# Patient Record
Sex: FEMALE | Race: WHITE | NOT HISPANIC OR LATINO | Employment: PART TIME | URBAN - METROPOLITAN AREA
[De-identification: names, ages, dates, MRNs, and addresses within clinical notes are randomized per-mention and may not be internally consistent; named-entity substitution may affect disease eponyms.]

---

## 2017-03-02 ENCOUNTER — ALLSCRIPTS OFFICE VISIT (OUTPATIENT)
Dept: OTHER | Facility: OTHER | Age: 42
End: 2017-03-02

## 2017-03-02 DIAGNOSIS — G40.109 LOCALZ-RLTD SYMPTOMATIC EPILEPSY W SMPL PART SZ, NONINTRACT, WO STATUS (HCC): ICD-10-CM

## 2017-03-10 ENCOUNTER — ALLSCRIPTS OFFICE VISIT (OUTPATIENT)
Dept: OTHER | Facility: OTHER | Age: 42
End: 2017-03-10

## 2017-06-29 ENCOUNTER — ALLSCRIPTS OFFICE VISIT (OUTPATIENT)
Dept: OTHER | Facility: OTHER | Age: 42
End: 2017-06-29

## 2017-06-29 ENCOUNTER — GENERIC CONVERSION - ENCOUNTER (OUTPATIENT)
Dept: OTHER | Facility: OTHER | Age: 42
End: 2017-06-29

## 2017-07-20 ENCOUNTER — GENERIC CONVERSION - ENCOUNTER (OUTPATIENT)
Dept: OTHER | Facility: OTHER | Age: 42
End: 2017-07-20

## 2017-07-20 DIAGNOSIS — N39.0 URINARY TRACT INFECTION: ICD-10-CM

## 2017-07-24 ENCOUNTER — GENERIC CONVERSION - ENCOUNTER (OUTPATIENT)
Dept: OTHER | Facility: OTHER | Age: 42
End: 2017-07-24

## 2017-07-28 ENCOUNTER — GENERIC CONVERSION - ENCOUNTER (OUTPATIENT)
Dept: OTHER | Facility: OTHER | Age: 42
End: 2017-07-28

## 2017-07-28 ENCOUNTER — HOSPITAL ENCOUNTER (OUTPATIENT)
Dept: INFUSION CENTER | Facility: CLINIC | Age: 42
Discharge: HOME | End: 2017-07-28
Payer: COMMERCIAL

## 2017-08-11 ENCOUNTER — GENERIC CONVERSION - ENCOUNTER (OUTPATIENT)
Dept: OTHER | Facility: OTHER | Age: 42
End: 2017-08-11

## 2017-08-17 ENCOUNTER — HOSPITAL ENCOUNTER (OUTPATIENT)
Dept: INFUSION CENTER | Facility: CLINIC | Age: 42
Discharge: HOME | End: 2017-08-17
Payer: COMMERCIAL

## 2017-08-17 VITALS
HEART RATE: 90 BPM | DIASTOLIC BLOOD PRESSURE: 81 MMHG | RESPIRATION RATE: 16 BRPM | TEMPERATURE: 98 F | SYSTOLIC BLOOD PRESSURE: 136 MMHG

## 2017-08-17 PROCEDURE — 51720 TREATMENT OF BLADDER LESION: CPT

## 2017-08-17 RX ORDER — LAMOTRIGINE 200 MG/1
25 TABLET ORAL 2 TIMES DAILY
COMMUNITY
End: 2018-03-17 | Stop reason: SDUPTHER

## 2017-08-17 RX ORDER — NITROFURANTOIN 25; 75 MG/1; MG/1
100 CAPSULE ORAL AS NEEDED
COMMUNITY
End: 2021-03-25 | Stop reason: HOSPADM

## 2017-08-17 RX ORDER — IBUPROFEN 200 MG
600 TABLET ORAL EVERY 8 HOURS PRN
COMMUNITY
End: 2019-03-15 | Stop reason: CLARIF

## 2017-08-17 RX ORDER — ACETAMINOPHEN 500 MG
1000 TABLET ORAL EVERY 6 HOURS PRN
COMMUNITY
End: 2021-03-25 | Stop reason: HOSPADM

## 2017-08-17 RX ORDER — VENLAFAXINE HYDROCHLORIDE 75 MG/1
75 CAPSULE, EXTENDED RELEASE ORAL EVERY EVENING
COMMUNITY

## 2017-08-17 RX ORDER — AZELASTINE 1 MG/ML
2 SPRAY, METERED NASAL 2 TIMES DAILY
COMMUNITY
End: 2019-03-15 | Stop reason: SDUPTHER

## 2017-08-17 RX ORDER — ATENOLOL 25 MG/1
25 TABLET ORAL EVERY MORNING
COMMUNITY
End: 2018-10-18

## 2017-08-17 RX ORDER — FLUTICASONE PROPIONATE 50 MCG
2 SPRAY, SUSPENSION (ML) NASAL 2 TIMES DAILY
COMMUNITY

## 2017-08-17 RX ORDER — CLOTRIMAZOLE 1 %
CREAM (GRAM) TOPICAL AS NEEDED
COMMUNITY

## 2017-08-17 RX ORDER — OMEPRAZOLE 20 MG/1
20 CAPSULE, DELAYED RELEASE ORAL EVERY MORNING
COMMUNITY

## 2017-08-17 RX ORDER — ROSUVASTATIN CALCIUM 40 MG/1
40 TABLET, COATED ORAL
COMMUNITY
End: 2018-10-05

## 2017-08-17 RX ORDER — REPAGLINIDE 2 MG/1
2 TABLET ORAL 3 TIMES DAILY
COMMUNITY

## 2017-08-17 RX ORDER — ALBUTEROL SULFATE 90 UG/1
2 AEROSOL, METERED RESPIRATORY (INHALATION) EVERY 6 HOURS PRN
COMMUNITY
End: 2018-11-27

## 2017-08-17 RX ORDER — LAMOTRIGINE 25 MG/1
25 TABLET ORAL 2 TIMES DAILY
COMMUNITY
End: 2018-04-05 | Stop reason: SDUPTHER

## 2017-08-17 RX ORDER — METFORMIN HYDROCHLORIDE 500 MG/1
1000 TABLET, EXTENDED RELEASE ORAL 2 TIMES DAILY
COMMUNITY

## 2017-08-17 RX ORDER — CLONAZEPAM 0.5 MG/1
0.5 TABLET ORAL 3 TIMES DAILY
COMMUNITY

## 2017-08-17 RX ADMIN — SODIUM CHLORIDE 50 MG: 9 INJECTION, SOLUTION INTRAMUSCULAR; INTRAVENOUS; SUBCUTANEOUS at 13:25

## 2017-08-17 NOTE — PROGRESS NOTES
Pt here for first bladder chemo  UC done 8/8/17  12fr gutierrez inserted and BCG instilled without complications  Pt rotated position every 15 minutes for 1 hour as per orders  Gutierrez removed  Pt remained in bed for additional hour  Bleach added to toilet, pt voided, flushed twice 20 minutes later  Additional bleach poured in toilet, flushed twice  Pt given bladder chemo instructions and reminded to add bleach before voiding for next 8 hours  Appointments made for next 5 visits, AVS given

## 2017-08-22 DIAGNOSIS — N39.0 URINARY TRACT INFECTION: ICD-10-CM

## 2017-08-22 DIAGNOSIS — R30.0 DYSURIA: ICD-10-CM

## 2017-08-24 ENCOUNTER — HOSPITAL ENCOUNTER (OUTPATIENT)
Dept: INFUSION CENTER | Facility: CLINIC | Age: 42
Discharge: HOME | End: 2017-08-24
Payer: COMMERCIAL

## 2017-08-24 ENCOUNTER — GENERIC CONVERSION - ENCOUNTER (OUTPATIENT)
Dept: OTHER | Facility: OTHER | Age: 42
End: 2017-08-24

## 2017-08-24 VITALS
DIASTOLIC BLOOD PRESSURE: 87 MMHG | HEART RATE: 88 BPM | RESPIRATION RATE: 18 BRPM | SYSTOLIC BLOOD PRESSURE: 137 MMHG | TEMPERATURE: 98.9 F

## 2017-08-24 PROCEDURE — 51720 TREATMENT OF BLADDER LESION: CPT

## 2017-08-24 RX ADMIN — SODIUM CHLORIDE 50 MG: 9 INJECTION, SOLUTION INTRAVENOUS at 13:25

## 2017-08-24 NOTE — PROGRESS NOTES
14fr gutierrez inserted and BCG instilled without complications  Pt rotated position every 15 minutes for 1 hour as per orders  Gutierrez removed  Pt remained in bed for additional hour  Bleach added to toilet, pt voided, flushed twice 20 minutes later  Additional bleach poured in toilet, flushed twice  Pt has bladder chemo instructions as home  AVS declined

## 2017-08-31 ENCOUNTER — HOSPITAL ENCOUNTER (OUTPATIENT)
Dept: INFUSION CENTER | Facility: CLINIC | Age: 42
Discharge: HOME | End: 2017-08-31
Payer: COMMERCIAL

## 2017-08-31 VITALS
HEART RATE: 100 BPM | TEMPERATURE: 101.8 F | SYSTOLIC BLOOD PRESSURE: 132 MMHG | DIASTOLIC BLOOD PRESSURE: 84 MMHG | RESPIRATION RATE: 18 BRPM

## 2017-08-31 PROCEDURE — 51720 TREATMENT OF BLADDER LESION: CPT

## 2017-08-31 RX ADMIN — SODIUM CHLORIDE 50 MG: 9 INJECTION, SOLUTION INTRAMUSCULAR; INTRAVENOUS; SUBCUTANEOUS at 13:41

## 2017-08-31 NOTE — PROGRESS NOTES
Made Dr Catrachita Nowak aware of patients temp of 99 8 and complaint of anterior abdominal pressure  Patient is pointing to her rib cage bilaterally  She is upset because she had to wait for her medication and is tearful  Dr Catrachita Nowak advised to proceed with BCG but to go to the ER if she begins to cough have increased fever or shaking

## 2017-08-31 NOTE — PROGRESS NOTES
Patient tolerated the insertion of a 12 fr gutierrez catheter  BCG instilled intravesically  Patient turned and rotated every 15 minutes for one hour, gutierrez was removed  However pt's temp was 99 8 at the start of treatment and 101 8 an hour into treatment  Patient was uncomfortable and wanted to leave  Patrizia Brown NP aware of temp and does not feel that it is related to BCG  Cameron Buitrago NP encouraged her to go to ER for cough or increased fever  Patient does not want to go today  She reported that she will go to urgent care if she still has a temp tomorrow morning  Dr Jolene Jackson aware patient held urine for 90 minutes

## 2017-08-31 NOTE — PLAN OF CARE

## 2017-09-05 ENCOUNTER — GENERIC CONVERSION - ENCOUNTER (OUTPATIENT)
Dept: OTHER | Facility: OTHER | Age: 42
End: 2017-09-05

## 2017-09-19 ENCOUNTER — GENERIC CONVERSION - ENCOUNTER (OUTPATIENT)
Dept: OTHER | Facility: OTHER | Age: 42
End: 2017-09-19

## 2017-10-30 DIAGNOSIS — N39.0 URINARY TRACT INFECTION: ICD-10-CM

## 2017-11-09 ENCOUNTER — GENERIC CONVERSION - ENCOUNTER (OUTPATIENT)
Dept: OTHER | Facility: OTHER | Age: 42
End: 2017-11-09

## 2017-11-28 ENCOUNTER — GENERIC CONVERSION - ENCOUNTER (OUTPATIENT)
Dept: OTHER | Facility: OTHER | Age: 42
End: 2017-11-28

## 2017-11-30 DIAGNOSIS — N39.0 URINARY TRACT INFECTION: ICD-10-CM

## 2017-12-15 ENCOUNTER — ALLSCRIPTS OFFICE VISIT (OUTPATIENT)
Dept: OTHER | Facility: OTHER | Age: 42
End: 2017-12-15

## 2017-12-15 LAB
BILIRUB UR QL STRIP: NORMAL
CLARITY UR: NORMAL
COLOR UR: YELLOW
GLUCOSE (HISTORICAL): NORMAL
HGB UR QL STRIP.AUTO: NORMAL
KETONES UR STRIP-MCNC: NORMAL MG/DL
LEUKOCYTE ESTERASE UR QL STRIP: NORMAL
NITRITE UR QL STRIP: NORMAL
PH UR STRIP.AUTO: 5 [PH]
PROT UR STRIP-MCNC: NORMAL MG/DL
SP GR UR STRIP.AUTO: 1.01
UROBILINOGEN UR QL STRIP.AUTO: 0.2

## 2018-01-10 NOTE — MISCELLANEOUS
Message   Recorded as Task   Date: 08/11/2017 03:17 PM, Created By: Darryl Schneider   Task Name: Call Back   Assigned To: Benigno FREEMAN,TEAM   Regarding Patient: Camille Litten, Status: Active   Comment:    Maria Elena Garcia - 11 Aug 2017 3:17 PM     TASK CREATED  Caller: Self; Results Inquiry; [phone] (Home)  patient called for her results of the urine culture please call her back   MINISTRY [facility] - 11 Aug 2017 3:35 PM     TASK EDITED  Called pt, her urine culture is negative for UTI  She would like to schedule her BCG's either on Mondays or Thursdays  Toma Love - 11 Aug 2017 3:42 PM     TASK EDITED  Noted  Will arrange Instillations and fax orders  Sched pt for 8/17 at noon  Pt notified  1        1 Amended By: Vadim Busby; Aug 14 2017 10:48 AM EST    Active Problems   1  Abnormal weight gain (783 1) (R63 5)  2  Acute cystitis (595 0) (N30 00)  3  Acute left flank pain (789 09,338 19) (R10 9)  4  Albuminuria (791 0) (R80 9)  5  Allergic rhinitis (477 9) (J30 9)  6  Arthralgia (719 40) (M25 50)  7  Asthma (493 90) (J45 909)  8  Back pain (724 5) (M54 9)  9  Bipolar affective disorder (296 80) (F31 9)  10  Bipolar Disorder (296 00)  11  Bladder cancer (188 9) (C67 9)  12  Carpal tunnel syndrome, unspecified laterality (354 0) (G56 00)  13  Depression (311) (F32 9)  14  Disc degeneration, lumbar (722 52) (M51 36)  15  DMII (diabetes mellitus, type 2) (250 00) (E11 9)  16  Dysuria (788 1) (R30 0)  17  Esophageal reflux (530 81) (K21 9)  18  Flank pain (789 09) (R10 9)  19  Hematuria (599 70) (R31 9)  20  Hernia (553 9) (K46 9)  21  Human papilloma virus infection (079 4) (B97 7)  22  Hyperlipidemia (272 4) (E78 5)  23  Hypertension (401 9) (I10)  24  Hypothyroidism (244 9) (E03 9)  25  Irritable bowel syndrome (564 1) (K58 9)  26  Mastoiditis, acute (383 00) (H70 009)  27  Nephrolithiasis (592 0) (N20 0)  28  Otitis media (382 9) (H66 90)  29  Proteinuria (791 0) (R80 9)  30  Recurrent streptococcal tonsillitis (034 0) (J03 01)  31  Simple partial seizures (345 50) (G40 109)  32  Sleep disorder (780 50) (G47 9)  33  Urinary tract infection (599 0) (N39 0)  34  Vaginal yeast infection (112 1) (B37 3)    Current Meds  1  Azelastine HCl - 0 15 % Nasal Solution (Astepro); 2 SPRAYS IN EACH NOSTRIL TWICE   DAILY  Requested for: 28SBK5622; Last Rx:23Jan2015 Ordered  2  Clotrimazole-Betamethasone 1-0 05 % External Cream (Lotrisone); APPLY A THIN   LAYER TO AFFECTED AREA(S) TWICE DAILY; Therapy: 08CWA8181 to [phone])  Requested for: 09LMW1097; Last   Rx:23Jan2015 Ordered  3  Colace CAPS; Take 2 Capsules at Bedtime Recorded  4  Crestor 40 MG Oral Tablet (Rosuvastatin Calcium); take 1 tablet by mouth every day; Therapy: 24KPI3972 to Recorded  5  Effexor XR 75 MG Oral Capsule Extended Release 24 Hour (Venlafaxine HCl ER)   Recorded  6  Fluticasone Propionate 50 MCG/ACT Nasal Suspension (Flonase); INSTILL 1 SPRAY IN   EACH NOSTRIL TWICE DAILY; Therapy: 00VMK6458 to (Evaluate:44Xgq1229)  Requested for: 91Mmn3819; Last   Rx:33Atg2710 Ordered  7  FreeStyle Lancets Miscellaneous; USE THREE TIMES DAILY AS DIRECTED DX 23360; Therapy: 39PUO9198 to (Evaluate:19Ctc3329)  Requested for: 80QVU3823; Last   JF:18OAB1902 Ordered  8  FreeStyle Lite Device; ONE GLUCOMETER MACHINE BID USE AS DIRECTED DX   80831; Therapy: 42ILW0075 to (Evaluate:20Jun2014)  Requested for: 42YNN9606; Last   ST:91PYD5876 Ordered  9  FreeStyle Lite Test In Vitro Strip; USE 1 STRIP 3 TIMES DAILY  DX 72473; Therapy: 84YHF6371 to (Evaluate:12Mxn0457)  Requested for: 02YEB0528; Last   PV:42YAR7659 Ordered  10  KlonoPIN 0 5 MG Oral Tablet (ClonazePAM); Therapy: (Alayne Mail) to Recorded  11  LamoTRIgine 200 MG Oral Tablet; TAKE 1 TABLET TWICE DAILY; Therapy: 57WBQ3393 to (Evaluate:28Sep2017)  Requested for: 39ERQ6659; Last    Rx:02Mar2017 Ordered  12   LamoTRIgine 25 MG Oral Tablet (LaMICtal); TAKE 1 (25MG) TABLET BY MOUTH TWICE    A DAY IN ADDITION TO 200MG TWICE DAILY; Therapy: 76Vdp5145 to (Evaluate:80Dsf3886)  Requested for: 48OVD6504; Last    Rx:02Mar2017 Ordered  13  Macrobid 100 MG Oral Capsule (Nitrofurantoin Monohyd Macro); TAKE 200 MG Twice    daily; Therapy: 87BBK4647 to Recorded  14  MetFORMIN HCl ER (OSM) 500 MG Oral Tablet Extended Release 24 Hour; take 1 tablet    twice daily as directed; Therapy: 65EYH8786 to Recorded  15  Nitrofurantoin Monohyd Macro 100 MG Oral Capsule (Macrobid); TAKE 1 CAPSULE    TWICE DAILY UNTIL GONE;    Therapy: 65IBX7885 to (Evaluate:12Wbj4716)  Requested for: 73YBM2883; Last    Rx:76Uhv5149 Ordered  16  Omeprazole 20 MG Oral Capsule Delayed Release; TAKE ONE CAPSULE EVERY DAY; Therapy: 84DKT0599 to (Joseph Pizarro)  Requested for: 03XFY4325; Last    Rx:12Mar2015 Ordered  17  Prandin 1 MG Oral Tablet (Repaglinide); take 4 mg as needed; Therapy: 42CGE0137 to Recorded  18  Pyridium 200 MG Oral Tablet; TAKE 1 TABLET 3 TIMES DAILY AS NEEDED FOR PAIN;    Therapy: 12GAW3778 to (Jose Alejandro Siddiqui)  Requested for: 28TEN2852; Last    GI:76NLV8831 Ordered  19  Ventolin  (90 Base) MCG/ACT Inhalation Aerosol Solution; INHALE 1 TO 2    PUFFS EVERY 4 TO 6 HOURS AS NEEDED; Therapy: 32KTX6661 to [phone])  Requested for: 20RUF4606; Last    Rx:23Jan2015 Ordered    Allergies   1  Augmentin TABS  2  Claritin CAPS  3  Darvocet A500 TABS  4  Darvon-N TABS  5  Influenza Virus Vaccine Whole  6   Singulair TABS    Signatures   Electronically signed by : Kev Churchill RN; Aug 14 2017 10:48AM EST                       (Author)

## 2018-01-11 ENCOUNTER — ALLSCRIPTS OFFICE VISIT (OUTPATIENT)
Dept: OTHER | Facility: OTHER | Age: 43
End: 2018-01-11

## 2018-01-11 LAB
BILIRUB UR QL STRIP: NEGATIVE
CLARITY UR: NORMAL
COLOR UR: YELLOW
GLUCOSE (HISTORICAL): NORMAL
HGB UR QL STRIP.AUTO: NORMAL
KETONES UR STRIP-MCNC: NEGATIVE MG/DL
LEUKOCYTE ESTERASE UR QL STRIP: NEGATIVE
NITRITE UR QL STRIP: NEGATIVE
PH UR STRIP.AUTO: 5.5 [PH]
PROT UR STRIP-MCNC: NORMAL MG/DL
SP GR UR STRIP.AUTO: 1.01
UROBILINOGEN UR QL STRIP.AUTO: 0.2

## 2018-01-11 NOTE — MISCELLANEOUS
Message   Recorded as Task   Date: 08/22/2017 12:59 PM, Created By: Roopa Calvert   Task Name: Call Back   Assigned To: Benigno Dallas,TEAM   Regarding Patient: Bree Dorsey, Status: Active   Comment:    Roopa Calvert - 22 Aug 2017 12:59 PM     TASK CREATED  Caller: Self; [phone] (Home); [phone] (Work)  Had BCG treatment last week and thinks she may have a uti  Worried she won't be able to have her treatment this week  Please advise [phone]   [place] Handler - 22 Aug 2017 1:24 PM     TASK EDITED  Pt stated that she is unsure if she has UTI  States that she has pressure and burning  Denies fever or chills  Pt was sent for a urine culture  Has BCG treatment 08/24/17 in afternoon  Active Problems    1  Abnormal weight gain (783 1) (R63 5)   2  Acute cystitis (595 0) (N30 00)   3  Acute left flank pain (789 09,338 19) (R10 9)   4  Albuminuria (791 0) (R80 9)   5  Allergic rhinitis (477 9) (J30 9)   6  Arthralgia (719 40) (M25 50)   7  Asthma (493 90) (J45 909)   8  Back pain (724 5) (M54 9)   9  Bipolar affective disorder (296 80) (F31 9)   10  Bipolar Disorder (296 00)   11  Bladder cancer (188 9) (C67 9)   12  Carpal tunnel syndrome, unspecified laterality (354 0) (G56 00)   13  Depression (311) (F32 9)   14  Disc degeneration, lumbar (722 52) (M51 36)   15  DMII (diabetes mellitus, type 2) (250 00) (E11 9)   16  Dysuria (788 1) (R30 0)   17  Esophageal reflux (530 81) (K21 9)   18  Flank pain (789 09) (R10 9)   19  Hematuria (599 70) (R31 9)   20  Hernia (553 9) (K46 9)   21  Human papilloma virus infection (079 4) (B97 7)   22  Hyperlipidemia (272 4) (E78 5)   23  Hypertension (401 9) (I10)   24  Hypothyroidism (244 9) (E03 9)   25  Irritable bowel syndrome (564 1) (K58 9)   26  Mastoiditis, acute (383 00) (H70 009)   27  Nephrolithiasis (592 0) (N20 0)   28  Otitis media (382 9) (H66 90)   29  Proteinuria (791 0) (R80 9)   30   Recurrent streptococcal tonsillitis (034 0) (J03 01)   31  Simple partial seizures (345 50) (G40 109)   32  Sleep disorder (780 50) (G47 9)   33  Urinary tract infection (599 0) (N39 0)   34  Vaginal yeast infection (112 1) (B37 3)    Current Meds   1  Azelastine HCl - 0 15 % Nasal Solution (Astepro); 2 SPRAYS IN EACH NOSTRIL TWICE   DAILY  Requested for: 59BVA5622; Last Rx:23Jan2015 Ordered   2  Clotrimazole-Betamethasone 1-0 05 % External Cream (Lotrisone); APPLY A THIN   LAYER TO AFFECTED AREA(S) TWICE DAILY; Therapy: 24IDL9781 to [phone])  Requested for: 89XMZ6239; Last   Rx:23Jan2015 Ordered   3  Colace CAPS; Take 2 Capsules at Bedtime Recorded   4  Crestor 40 MG Oral Tablet (Rosuvastatin Calcium); take 1 tablet by mouth every day; Therapy: 07ZXL6308 to Recorded   5  Effexor XR 75 MG Oral Capsule Extended Release 24 Hour (Venlafaxine HCl ER)   Recorded   6  Fluticasone Propionate 50 MCG/ACT Nasal Suspension (Flonase); INSTILL 1 SPRAY IN   EACH NOSTRIL TWICE DAILY; Therapy: 41HCR2606 to (Evaluate:53Log2889)  Requested for: 26Wpd9722; Last   Rx:01Yav2581 Ordered   7  FreeStyle Lancets Miscellaneous; USE THREE TIMES DAILY AS DIRECTED DX 51044; Therapy: 80IPO1679 to (Evaluate:82Obg3640)  Requested for: 68UVA9970; Last   QA:87WPP6491 Ordered   8  FreeStyle Lite Device; ONE GLUCOMETER MACHINE BID USE AS DIRECTED DX   26379; Therapy: 70KKL1350 to (Evaluate:03Alo8769)  Requested for: 31GSH0679; Last   WY:24ZXP6008 Ordered   9  FreeStyle Lite Test In Vitro Strip; USE 1 STRIP 3 TIMES DAILY  DX 06977; Therapy: 21IIJ6233 to (Evaluate:84Anc5490)  Requested for: 09XHC2178; Last   AI:93WIC6006 Ordered   10  KlonoPIN 0 5 MG Oral Tablet (ClonazePAM); Therapy: (Isacc Goodwin) to Recorded   11  LamoTRIgine 200 MG Oral Tablet; TAKE 1 TABLET TWICE DAILY; Therapy: 11IHR9133 to (Evaluate:16Emu1721)  Requested for: 28SFV4600; Last    Rx:02Mar2017 Ordered   12   LamoTRIgine 25 MG Oral Tablet (LaMICtal); TAKE 1 (25MG) TABLET BY MOUTH TWICE    A DAY IN ADDITION TO 200MG TWICE DAILY; Therapy: 71Opg5242 to (Evaluate:33Foy8976)  Requested for: 44AJE8792; Last    Rx:02Mar2017 Ordered   13  Macrobid 100 MG Oral Capsule (Nitrofurantoin Monohyd Macro); TAKE 200 MG Twice    daily; Therapy: 62UVT8659 to Recorded   14  MetFORMIN HCl ER (OSM) 500 MG Oral Tablet Extended Release 24 Hour; take 1 tablet    twice daily as directed; Therapy: 39FDV6612 to Recorded   15  Nitrofurantoin Monohyd Macro 100 MG Oral Capsule (Macrobid); TAKE 1 CAPSULE    TWICE DAILY UNTIL GONE;    Therapy: 73QMH2010 to (Evaluate:82Tmz7402)  Requested for: 87BUB3364; Last    Rx:03Aig2245 Ordered   16  Omeprazole 20 MG Oral Capsule Delayed Release; TAKE ONE CAPSULE EVERY DAY; Therapy: 69REL8760 to (Hartley Coast)  Requested for: 20VJW8370; Last    Rx:12Mar2015 Ordered   17  Prandin 1 MG Oral Tablet (Repaglinide); take 4 mg as needed; Therapy: 09SYP0331 to Recorded   18  Pyridium 200 MG Oral Tablet; TAKE 1 TABLET 3 TIMES DAILY AS NEEDED FOR PAIN;    Therapy: 92UJL3740 to (Karrie Kinsey)  Requested for: 17ZHB3642; Last    JX:86HPM9107 Ordered   19  Ventolin  (90 Base) MCG/ACT Inhalation Aerosol Solution; INHALE 1 TO 2    PUFFS EVERY 4 TO 6 HOURS AS NEEDED; Therapy: 13LYK5265 to [phone])  Requested for: 23PVK0471; Last    Rx:23Jan2015 Ordered    Allergies    1  Augmentin TABS   2  Claritin CAPS   3  Darvocet A500 TABS   4  Darvon-N TABS   5  Influenza Virus Vaccine Whole   6  Singulair TABS    Plan  Dysuria, Urinary tract infection    · (1) URINE CULTURE; Source:Urine, Clean Catch; Status:Active - Retrospective  Authorization;  Requested for:87Hga6945;     Signatures   Electronically signed by : Janey Davison, ; Aug 22 2017  1:24PM EST                       (Author)

## 2018-01-11 NOTE — MISCELLANEOUS
Message  PER DR AZUL TO CANCEL REMAINING THREE BCG INSTILLATIONS  CALLED [facility]ION [facility] AND CANCELLED  PT WAS SEPTIC SHORTLY AFTER LAST TREATMENT  Active Problems    1  Abnormal weight gain (783 1) (R63 5)   2  Acute cystitis (595 0) (N30 00)   3  Acute left flank pain (789 09,338 19) (R10 9)   4  Albuminuria (791 0) (R80 9)   5  Allergic rhinitis (477 9) (J30 9)   6  Arthralgia (719 40) (M25 50)   7  Asthma (493 90) (J45 909)   8  Back pain (724 5) (M54 9)   9  Bipolar affective disorder (296 80) (F31 9)   10  Bipolar Disorder (296 00)   11  Bladder cancer (188 9) (C67 9)   12  Carpal tunnel syndrome, unspecified laterality (354 0) (G56 00)   13  Depression (311) (F32 9)   14  Disc degeneration, lumbar (722 52) (M51 36)   15  DMII (diabetes mellitus, type 2) (250 00) (E11 9)   16  Dysuria (788 1) (R30 0)   17  Esophageal reflux (530 81) (K21 9)   18  Flank pain (789 09) (R10 9)   19  Hematuria (599 70) (R31 9)   20  Hernia (553 9) (K46 9)   21  Human papilloma virus infection (079 4) (B97 7)   22  Hyperlipidemia (272 4) (E78 5)   23  Hypertension (401 9) (I10)   24  Hypothyroidism (244 9) (E03 9)   25  Irritable bowel syndrome (564 1) (K58 9)   26  Mastoiditis, acute (383 00) (H70 009)   27  Nephrolithiasis (592 0) (N20 0)   28  Otitis media (382 9) (H66 90)   29  Proteinuria (791 0) (R80 9)   30  Recurrent streptococcal tonsillitis (034 0) (J03 01)   31  Simple partial seizures (345 50) (G40 109)   32  Sleep disorder (780 50) (G47 9)   33  Urinary tract infection (599 0) (N39 0)   34  Vaginal yeast infection (112 1) (B37 3)    Current Meds   1  Azelastine HCl - 0 15 % Nasal Solution (Astepro); 2 SPRAYS IN EACH NOSTRIL TWICE   DAILY  Requested for: 26JKP2664; Last Rx:23Jan2015 Ordered   2  Clotrimazole-Betamethasone 1-0 05 % External Cream (Lotrisone); APPLY A THIN   LAYER TO AFFECTED AREA(S) TWICE DAILY; Therapy: 49QSG5007 to [phone])  Requested for: 94YIJ6268;  Last   Rx:23Jan2015 Ordered 3  Colace CAPS; Take 2 Capsules at Bedtime Recorded   4  Crestor 40 MG Oral Tablet (Rosuvastatin Calcium); take 1 tablet by mouth every day; Therapy: 07TMA7662 to Recorded   5  Effexor XR 75 MG Oral Capsule Extended Release 24 Hour (Venlafaxine HCl ER)   Recorded   6  Fluconazole 150 MG Oral Tablet (Diflucan); TAKE 1 TABLET ONCE  MAY REPEAT IN 2   DAYS ;   Therapy: 15Atw5311 to (Last Rx:05Fef1611)  Requested for: 07Eea0133 Ordered   7  Fluticasone Propionate 50 MCG/ACT Nasal Suspension (Flonase); INSTILL 1 SPRAY IN   EACH NOSTRIL TWICE DAILY; Therapy: 24TMT9597 to (Evaluate:67Gve9248)  Requested for: 61Chr1885; Last   Rx:11Sep2015 Ordered   8  FreeStyle Lancets Miscellaneous; USE THREE TIMES DAILY AS DIRECTED DX 43809; Therapy: 67AIT5129 to (Evaluate:36Eux7979)  Requested for: 34OXH0094; Last   UN:91QJB8907 Ordered   9  FreeStyle Lite Device; ONE GLUCOMETER MACHINE BID USE AS DIRECTED DX   30871; Therapy: 36XYA4759 to (Evaluate:13Ucr9431)  Requested for: 37SEM7193; Last   MQ:28AMQ1686 Ordered   10  FreeStyle Lite Test In Vitro Strip; USE 1 STRIP 3 TIMES DAILY  DX 88038; Therapy: 50YOZ3990 to (Evaluate:99Mjz7894)  Requested for: 75JIC3749; Last    FU:06TFD3885 Ordered   11  KlonoPIN 0 5 MG Oral Tablet (ClonazePAM); Therapy: (Clemon Sic) to Recorded   12  LamoTRIgine 200 MG Oral Tablet; TAKE 1 TABLET TWICE DAILY; Therapy: 82JHL0104 to (Evaluate:50Tte5738)  Requested for: 29QNU6721; Last    Rx:02Mar2017 Ordered   13  LamoTRIgine 25 MG Oral Tablet (LaMICtal); TAKE 1 (25MG) TABLET BY MOUTH TWICE    A DAY IN ADDITION TO 200MG TWICE DAILY; Therapy: 90Wuo9745 to (Evaluate:28Nhg0333)  Requested for: 43JHO8701; Last    Rx:02Mar2017 Ordered   14  Macrobid 100 MG Oral Capsule (Nitrofurantoin Monohyd Macro); TAKE 200 MG Twice    daily; Therapy: 15NNR5123 to Recorded   15  MetFORMIN HCl ER (OSM) 500 MG Oral Tablet Extended Release 24 Hour; take 1 tablet    twice daily as directed;     Therapy: 26XNJ2672 to Recorded   16  Nitrofurantoin Monohyd Macro 100 MG Oral Capsule (Macrobid); TAKE 1 CAPSULE    TWICE DAILY UNTIL GONE;    Therapy: 63HRZ2743 to (Evaluate:13Teu2739)  Requested for: 96TSE3046; Last    Rx:08Qkl7170 Ordered   17  Omeprazole 20 MG Oral Capsule Delayed Release; TAKE ONE CAPSULE EVERY DAY; Therapy: 75OQN8601 to ([phone])  Requested for: 63ECS1649; Last    Rx:12Mar2015 Ordered   18  Prandin 1 MG Oral Tablet (Repaglinide); take 4 mg as needed; Therapy: 67UGX8044 to Recorded   19  Pyridium 200 MG Oral Tablet; TAKE 1 TABLET 3 TIMES DAILY AS NEEDED FOR PAIN;    Therapy: 65DRR5370 to (Pato Rad)  Requested for: 12IOI9780; Last    KM:02FZT7131 Ordered   20  Ventolin  (90 Base) MCG/ACT Inhalation Aerosol Solution; INHALE 1 TO 2    PUFFS EVERY 4 TO 6 HOURS AS NEEDED; Therapy: 86QQD5581 to [phone])  Requested for: 01POY9057; Last    Rx:23Jan2015 Ordered    Allergies    1  Augmentin TABS   2  Claritin CAPS   3  Darvocet A500 TABS   4  Darvon-N TABS   5  Influenza Virus Vaccine Whole   6   Singulair TABS    Signatures   Electronically signed by : Kevin Riley RN; Sep  5 2017  3:12PM EST                       (Author)

## 2018-01-12 NOTE — MISCELLANEOUS
Message   Recorded as Task   Date: 07/20/2017 09:45 AM, Created By: Licha Toribio   Task Name: Call Back   Assigned To: Benigno FREEMAN,TEAM   Regarding Patient: James Stapleton, Status: Active   Comment:    Licha Her - 20 Jul 2017 9:45 AM     TASK CREATED  Caller: Self; [phone] (Home)  Pt returning call back to [address] [place] [address] - 20 Jul 2017 10:55 AM     TASK EDITED  Pt  was in [facility] ER 2 weeks ago for UTI  Placed on Keflex QID  Just finished course 2 days ago  Informed BCG Consent needs to be signed prior to sched Instillations  Will obtain culture prior to treatments  Pt  will be in 7/21 to sign  Active Problems    1  Abnormal weight gain (783 1) (R63 5)   2  Acute left flank pain (789 09,338 19) (R10 9)   3  Albuminuria (791 0) (R80 9)   4  Allergic rhinitis (477 9) (J30 9)   5  Arthralgia (719 40) (M25 50)   6  Asthma (493 90) (J45 909)   7  Back pain (724 5) (M54 9)   8  Bipolar affective disorder (296 80) (F31 9)   9  Bipolar Disorder (296 00)   10  Bladder cancer (188 9) (C67 9)   11  Carpal tunnel syndrome, unspecified laterality (354 0) (G56 00)   12  Depression (311) (F32 9)   13  Disc degeneration, lumbar (722 52) (M51 36)   14  DMII (diabetes mellitus, type 2) (250 00) (E11 9)   15  Dysuria (788 1) (R30 0)   16  Esophageal reflux (530 81) (K21 9)   17  Flank pain (789 09) (R10 9)   18  Hematuria (599 70) (R31 9)   19  Hernia (553 9) (K46 9)   20  Human papilloma virus infection (079 4) (B97 7)   21  Hyperlipidemia (272 4) (E78 5)   22  Hypertension (401 9) (I10)   23  Hypothyroidism (244 9) (E03 9)   24  Irritable bowel syndrome (564 1) (K58 9)   25  Mastoiditis, acute (383 00) (H70 009)   26  Nephrolithiasis (592 0) (N20 0)   27  Otitis media (382 9) (H66 90)   28  Proteinuria (791 0) (R80 9)   29  Recurrent streptococcal tonsillitis (034 0) (J03 01)   30  Simple partial seizures (345 50) (G40 109)   31  Sleep disorder (780 50) (G47 9)   32  Urinary tract infection (599 0) (N39 0)   33  Vaginal yeast infection (112 1) (B37 3)    Current Meds   1  Azelastine HCl - 0 15 % Nasal Solution (Astepro); 2 SPRAYS IN EACH NOSTRIL TWICE   DAILY  Requested for: 37UWV4353; Last Rx:23Jan2015 Ordered   2  Clotrimazole-Betamethasone 1-0 05 % External Cream (Lotrisone); APPLY A THIN   LAYER TO AFFECTED AREA(S) TWICE DAILY; Therapy: 83ILP3824 to [phone])  Requested for: 32HLU9230; Last   Rx:23Jan2015 Ordered   3  Colace CAPS; Take 2 Capsules at Bedtime Recorded   4  Crestor 40 MG Oral Tablet (Rosuvastatin Calcium); take 1 tablet by mouth every day; Therapy: 85YHQ0185 to Recorded   5  Effexor XR 75 MG Oral Capsule Extended Release 24 Hour (Venlafaxine HCl ER)   Recorded   6  Fluticasone Propionate 50 MCG/ACT Nasal Suspension (Flonase); INSTILL 1 SPRAY IN   EACH NOSTRIL TWICE DAILY; Therapy: 89ZJT0107 to (Evaluate:69Ojs1206)  Requested for: 86Jcs8462; Last   Rx:11Sep2015 Ordered   7  FreeStyle Lancets Miscellaneous; USE THREE TIMES DAILY AS DIRECTED DX 61247; Therapy: 10LDI6831 to (Evaluate:59Tlq4593)  Requested for: 18SWN2866; Last   YB:58CHA5267 Ordered   8  FreeStyle Lite Device; ONE GLUCOMETER MACHINE BID USE AS DIRECTED DX   40298; Therapy: 62XIL8510 to (Evaluate:20Jun2014)  Requested for: 23ZYW3133; Last   CQ:93CRH2141 Ordered   9  FreeStyle Lite Test In Vitro Strip; USE 1 STRIP 3 TIMES DAILY  DX 32928; Therapy: 52XTY9484 to (Evaluate:39Shp0203)  Requested for: 89DSF5767; Last   UI:07TTI2994 Ordered   10  KlonoPIN 0 5 MG Oral Tablet (ClonazePAM); Therapy: (Trellis Sheikh) to Recorded   11  LamoTRIgine 200 MG Oral Tablet; TAKE 1 TABLET TWICE DAILY; Therapy: 82DQV9105 to (Evaluate:63Lvi0007)  Requested for: 84IQU7146; Last    Rx:02Mar2017 Ordered   12  LamoTRIgine 25 MG Oral Tablet (LaMICtal); TAKE 1 (25MG) TABLET BY MOUTH TWICE    A DAY IN ADDITION TO 200MG TWICE DAILY;     Therapy: 20Feb2014 to (Evaluate:28Sep2017)  Requested for: 91GMQ1103; Last    Rx:02Mar2017 Ordered   13  Macrobid 100 MG Oral Capsule (Nitrofurantoin Monohyd Macro); TAKE 200 MG Twice    daily; Therapy: 59CMU4086 to Recorded   14  MetFORMIN HCl ER (OSM) 500 MG Oral Tablet Extended Release 24 Hour; take 1 tablet    twice daily as directed; Therapy: 04UDG2569 to Recorded   15  Omeprazole 20 MG Oral Capsule Delayed Release; TAKE ONE CAPSULE EVERY DAY; Therapy: 52SMM8694 to (Nelma Bilberry)  Requested for: 23CII8149; Last    Rx:12Mar2015 Ordered   16  Prandin 1 MG Oral Tablet (Repaglinide); take 4 mg as needed; Therapy: 94JUG9874 to Recorded   17  Pyridium 200 MG Oral Tablet; TAKE 1 TABLET 3 TIMES DAILY AS NEEDED FOR PAIN;    Therapy: 01PMG1679 to (Jarad Apple)  Requested for: 84OFI8877; Last    IE:78QWO1354 Ordered   18  Ventolin  (90 Base) MCG/ACT Inhalation Aerosol Solution; INHALE 1 TO 2    PUFFS EVERY 4 TO 6 HOURS AS NEEDED; Therapy: 90SUW4976 to [phone])  Requested for: 73AFZ3072; Last    Rx:23Jan2015 Ordered    Allergies    1  Augmentin TABS   2  Claritin CAPS   3  Darvocet A500 TABS   4  Darvon-N TABS   5  Influenza Virus Vaccine Whole   6   Singulair TABS    Signatures   Electronically signed by : Magda Meredith RN; Jul 20 2017 10:56AM EST                       (Author)

## 2018-01-12 NOTE — MISCELLANEOUS
Message   Recorded as Task   Date: 08/24/2017 08:52 AM, Created By: Luisa Dunn   Task Name: Call Back   Assigned To: Benigno Dallas,TEAM   Regarding Patient: Susi Pepe, Status: Active   Comment:    Luisa Dunn - 24 Aug 2017 8:52 AM     TASK CREATED  Caller: Self; [phone] (Home); [phone] (Work)  Pt calling for urine culture results had done 8/22/17  Scheduled for BCG treatment today and needs to know the results before treatment  [phone]   Toma Love - 24 Aug 2017 9:08 AM     TASK EDITED  Pt  informed culture showed mixed contaminants  No bladder infection to proceed with treatment  Active Problems    1  Abnormal weight gain (783 1) (R63 5)   2  Acute cystitis (595 0) (N30 00)   3  Acute left flank pain (789 09,338 19) (R10 9)   4  Albuminuria (791 0) (R80 9)   5  Allergic rhinitis (477 9) (J30 9)   6  Arthralgia (719 40) (M25 50)   7  Asthma (493 90) (J45 909)   8  Back pain (724 5) (M54 9)   9  Bipolar affective disorder (296 80) (F31 9)   10  Bipolar Disorder (296 00)   11  Bladder cancer (188 9) (C67 9)   12  Carpal tunnel syndrome, unspecified laterality (354 0) (G56 00)   13  Depression (311) (F32 9)   14  Disc degeneration, lumbar (722 52) (M51 36)   15  DMII (diabetes mellitus, type 2) (250 00) (E11 9)   16  Dysuria (788 1) (R30 0)   17  Esophageal reflux (530 81) (K21 9)   18  Flank pain (789 09) (R10 9)   19  Hematuria (599 70) (R31 9)   20  Hernia (553 9) (K46 9)   21  Human papilloma virus infection (079 4) (B97 7)   22  Hyperlipidemia (272 4) (E78 5)   23  Hypertension (401 9) (I10)   24  Hypothyroidism (244 9) (E03 9)   25  Irritable bowel syndrome (564 1) (K58 9)   26  Mastoiditis, acute (383 00) (H70 009)   27  Nephrolithiasis (592 0) (N20 0)   28  Otitis media (382 9) (H66 90)   29  Proteinuria (791 0) (R80 9)   30  Recurrent streptococcal tonsillitis (034 0) (J03 01)   31  Simple partial seizures (345 50) (G40 109)   32   Sleep disorder (780 50) (G47 9)   33  Urinary tract infection (599 0) (N39 0)   34  Vaginal yeast infection (112 1) (B37 3)    Current Meds   1  Azelastine HCl - 0 15 % Nasal Solution (Astepro); 2 SPRAYS IN EACH NOSTRIL TWICE   DAILY  Requested for: 00XDC7945; Last Rx:2015 Ordered   2  Clotrimazole-Betamethasone 1-0 05 % External Cream (Lotrisone); APPLY A THIN   LAYER TO AFFECTED AREA(S) TWICE DAILY; Therapy: 20REG8688 to [phone])  Requested for: 40HFG3456; Last   Rx:2015 Ordered   3  Colace CAPS; Take 2 Capsules at Bedtime Recorded   4  Crestor 40 MG Oral Tablet (Rosuvastatin Calcium); take 1 tablet by mouth every day; Therapy: 76XJA7634 to Recorded   5  Effexor XR 75 MG Oral Capsule Extended Release 24 Hour (Venlafaxine HCl ER)   Recorded   6  Fluticasone Propionate 50 MCG/ACT Nasal Suspension (Flonase); INSTILL 1 SPRAY IN   EACH NOSTRIL TWICE DAILY; Therapy: 76UWS8952 to (Evaluate:02Jkn9272)  Requested for: 09Lhy0441; Last   Rx:79Vwb3772 Ordered   7  FreeStyle Lancets Miscellaneous; USE THREE TIMES DAILY AS DIRECTED DX 61353; Therapy: 92SYK6745 to (Evaluate:72Xoo9811)  Requested for: 60MKF6401; Last   M47HRF7958 Ordered   8  FreeStyle Lite Device; ONE GLUCOMETER MACHINE BID USE AS DIRECTED DX   84915; Therapy: 03UMD5860 to (Evaluate:32Djl9576)  Requested for: 80XIU4064; Last   DL:54KLZ2978 Ordered   9  FreeStyle Lite Test In Vitro Strip; USE 1 STRIP 3 TIMES DAILY  DX 65592; Therapy: 61EOF6180 to (Evaluate:93Odv8613)  Requested for: 38ZAF2285; Last   SY:18LYJ6464 Ordered   10  KlonoPIN 0 5 MG Oral Tablet (ClonazePAM); Therapy: [phone]) to Recorded   11  LamoTRIgine 200 MG Oral Tablet; TAKE 1 TABLET TWICE DAILY; Therapy: 10BLS1221 to (Evaluate:59Fnn3944)  Requested for: 03HJZ9553; Last    Rx:2017 Ordered   12  LamoTRIgine 25 MG Oral Tablet (LaMICtal); TAKE 1 (25MG) TABLET BY MOUTH TWICE    A DAY IN ADDITION TO 200MG TWICE DAILY;     Therapy: 57Iay4628 to (Evaluate:38Rhp6263)  Requested for: 16MCR9140; Last    Rx:02Mar2017 Ordered   13  Macrobid 100 MG Oral Capsule (Nitrofurantoin Monohyd Macro); TAKE 200 MG Twice    daily; Therapy: 30UMI9138 to Recorded   14  MetFORMIN HCl ER (OSM) 500 MG Oral Tablet Extended Release 24 Hour; take 1 tablet    twice daily as directed; Therapy: 99MWD4320 to Recorded   15  Nitrofurantoin Monohyd Macro 100 MG Oral Capsule (Macrobid); TAKE 1 CAPSULE    TWICE DAILY UNTIL GONE;    Therapy: 62ZZI0618 to (Evaluate:99Jxb3270)  Requested for: 48GLD5751; Last    Rx:76Snh1298 Ordered   16  Omeprazole 20 MG Oral Capsule Delayed Release; TAKE ONE CAPSULE EVERY DAY; Therapy: 16LRL3804 to [phone])  Requested for: 02DWN2619; Last    Rx:12Mar2015 Ordered   17  Prandin 1 MG Oral Tablet (Repaglinide); take 4 mg as needed; Therapy: 80QFL9029 to Recorded   18  Pyridium 200 MG Oral Tablet; TAKE 1 TABLET 3 TIMES DAILY AS NEEDED FOR PAIN;    Therapy: 04BMP6158 to (Malinda Laity)  Requested for: 61VEN7181; Last    JH:26MWZ9699 Ordered   19  Ventolin  (90 Base) MCG/ACT Inhalation Aerosol Solution; INHALE 1 TO 2    PUFFS EVERY 4 TO 6 HOURS AS NEEDED; Therapy: 57JHD4572 to [phone])  Requested for: 30EUF4076; Last    Rx:23Jan2015 Ordered    Allergies    1  Augmentin TABS   2  Claritin CAPS   3  Darvocet A500 TABS   4  Darvon-N TABS   5  Influenza Virus Vaccine Whole   6   Singulair TABS    Signatures   Electronically signed by : Magda Meredith RN; Aug 24 2017  9:09AM EST                       (Author)

## 2018-01-12 NOTE — MISCELLANEOUS
Message   Recorded as Task   Date: 11/09/2017 12:02 PM, Created By: Sharifa Hernández   Task Name: Medical Complaint Callback   Assigned To: Benigno Dallas,TEAM   Regarding Patient: Zeferino Siddiqui, Status: Active   Comment:    Maria Elena Garcia - 09 Nov 2017 12:02 PM     TASK CREATED  Caller: Self; Medical Complaint; [phone] (Home); [phone]  (Work)  PATIENT CALLED SAYING SHE FINSIHED HER ANTIBIOTCS AND SHE STILL HAS THE INFECTION Tausalomon Fairy HER BACK AT HOME #   Kenisha Moran - 09 Nov 2017 12:29 PM     TASK EDITED  lmom to call back  Active Problems    1  Abnormal weight gain (783 1) (R63 5)   2  Acute cystitis (595 0) (N30 00)   3  Acute left flank pain (789 09,338 19) (R10 9)   4  Albuminuria (791 0) (R80 9)   5  Allergic rhinitis (477 9) (J30 9)   6  Arthralgia (719 40) (M25 50)   7  Asthma (493 90) (J45 909)   8  Back pain (724 5) (M54 9)   9  Bipolar affective disorder (296 80) (F31 9)   10  Bipolar Disorder (296 00)   11  Bladder cancer (188 9) (C67 9)   12  Carpal tunnel syndrome, unspecified laterality (354 0) (G56 00)   13  Depression (311) (F32 9)   14  Disc degeneration, lumbar (722 52) (M51 36)   15  Dysuria (788 1) (R30 0)   16  Esophageal reflux (530 81) (K21 9)   17  Flank pain (789 09) (R10 9)   18  Hematuria (599 70) (R31 9)   19  Hernia (553 9) (K46 9)   20  Human papilloma virus infection (079 4) (B97 7)   21  Hyperlipidemia (272 4) (E78 5)   22  Hypertension (401 9) (I10)   23  Hypothyroidism (244 9) (E03 9)   24  Irritable bowel syndrome (564 1) (K58 9)   25  Mastoiditis, acute (383 00) (H70 009)   26  Nephrolithiasis (592 0) (N20 0)   27  Otitis media (382 9) (H66 90)   28  Proteinuria (791 0) (R80 9)   29  Recurrent streptococcal tonsillitis (034 0) (J03 01)   30  Simple partial seizures (345 50) (G40 109)   31  Sleep disorder (780 50) (G47 9)   32  Urinary tract infection (599 0) (N39 0)   33  Vaginal yeast infection (112 1) (B37 3)    Current Meds   1  Amaryl 1 MG Oral Tablet (Glimepiride); Therapy: (Recorded:41Bnv0785) to Recorded   2  Azelastine HCl - 0 15 % Nasal Solution (Astepro); 2 SPRAYS IN EACH NOSTRIL TWICE   DAILY  Requested for: 74CYZ1145; Last Rx:23Jan2015 Ordered   3  Clotrimazole-Betamethasone 1-0 05 % External Cream (Lotrisone); APPLY A THIN   LAYER TO AFFECTED AREA(S) TWICE DAILY; Therapy: 15GYI6992 to Leslie London)  Requested for: 23PWQ8671; Last   Rx:23Jan2015 Ordered   4  Colace CAPS; Take 2 Capsules at Bedtime Recorded   5  Crestor 40 MG Oral Tablet (Rosuvastatin Calcium); take 1 tablet by mouth every day; Therapy: 32XKN0726 to Recorded   6  Effexor XR 75 MG Oral Capsule Extended Release 24 Hour (Venlafaxine HCl ER)   Recorded   7  Fluconazole 150 MG Oral Tablet (Diflucan); TAKE 1 TABLET ONCE  MAY REPEAT IN 2   DAYS ;   Therapy: 69Xyh1663 to (Last Rx:01Sep2017)  Requested for: 01Sep2017 Ordered   8  Fluconazole 200 MG Oral Tablet (Diflucan); TAKE 1 TABLET 1 TIME ONLY; Therapy: 56GJD2195 to (Evaluate:73Dfd7455)  Requested for: 92VTT7626; Last   Rx:21Sep2017 Ordered   9  Fluticasone Propionate 50 MCG/ACT Nasal Suspension (Flonase); INSTILL 1 SPRAY IN   EACH NOSTRIL TWICE DAILY; Therapy: 77MFZ2970 to (Evaluate:60Rji6483)  Requested for: 22Zsi8049; Last   Rx:57Hgd8028 Ordered   10  FreeStyle Lancets Miscellaneous; USE THREE TIMES DAILY AS DIRECTED DX 42823; Therapy: 53YDC9039 to (Evaluate:15Kex8903)  Requested for: 26TZI0254; Last    SJ:92ODS8431 Ordered   11  FreeStyle Lite Device; ONE GLUCOMETER MACHINE BID USE AS DIRECTED DX    40054; Therapy: 89IBR3696 to (Evaluate:86Pot3040)  Requested for: 25RDA8634; Last    SW:14HPQ4491 Ordered   12  FreeStyle Lite Test In Vitro Strip; USE 1 STRIP 3 TIMES DAILY  DX 68870; Therapy: 40UPS8422 to (Evaluate:28Tdx4765)  Requested for: 37NIR0511; Last    XN:44NBQ1343 Ordered   13  Januvia 100 MG Oral Tablet; Therapy: (Alexis Harry) to Recorded   14   KlonoPIN 0 5 MG Oral Tablet (ClonazePAM); Therapy: (Tyler Prajapati) to Recorded   15  LamoTRIgine 200 MG Oral Tablet; TAKE 1 TABLET TWICE DAILY; Therapy: 68ZRE7512 to (Evaluate:39Lso2959)  Requested for: 54LHE0329; Last    Rx:12Oct2017 Ordered   16  LamoTRIgine 25 MG Oral Tablet (LaMICtal); TAKE 1 (25MG) TABLET BY MOUTH TWICE    A DAY IN ADDITION TO 200MG TWICE DAILY; Therapy: 41Nft9401 to (Evaluate:58Kdn0456)  Requested for: 34PCP3323; Last    Rx:02Mar2017 Ordered   17  Lipitor 40 MG Oral Tablet (Atorvastatin Calcium); Therapy: (Yassine Gusman) to Recorded   18  Lotrisone 1-0 05 % LOTN (Clotrimazole-Betamethasone); Therapy: (Yassine Gusman) to Recorded   19  Macrobid 100 MG Oral Capsule (Nitrofurantoin Monohyd Macro); TAKE 200 MG Twice    daily; Therapy: 03UDH8400 to Recorded   20  MetFORMIN HCl ER (OSM) 500 MG Oral Tablet Extended Release 24 Hour; take 1 tablet    twice daily as directed; Therapy: 22DBD1025 to Recorded   21  Nitrofurantoin Monohyd Macro 100 MG Oral Capsule (Macrobid); TAKE 1 CAPSULE    TWICE DAILY UNTIL GONE;    Therapy: 92YSF1744 to (Evaluate:00Kzs8854)  Requested for: 20XFT8148; Last    Rx:62Ppt9833 Ordered   22  Omeprazole 20 MG Oral Capsule Delayed Release; TAKE ONE CAPSULE EVERY DAY; Therapy: 68QAT0020 to (Chris Mcintosh)  Requested for: 63MEA2519; Last    Rx:12Mar2015 Ordered   23  Prandin 1 MG Oral Tablet (Repaglinide); take 4 mg as needed; Therapy: 28DUV5140 to Recorded   24  Pravachol 40 MG Oral Tablet (Pravastatin Sodium); Therapy: (Yassine Gusman) to Recorded   25  PriLOSEC 20 MG CPDR (Omeprazole); Therapy: (Yassine Gusman) to Recorded   26  Pyridium 200 MG Oral Tablet; TAKE 1 TABLET 3 TIMES DAILY AS NEEDED FOR PAIN;    Therapy: 97WZZ5419 to (Kirby Vargas)  Requested for: 02DQL6955; Last    DJ:48JME2962 Ordered   27  Sulfamethoxazole-Trimethoprim 800-160 MG Oral Tablet (Bactrim DS);  Take 1 tablet    BID for 10 days, then 1/2 tablet QD for 90 days; Therapy: 36EXE6602 to (Last Daberlin Ball)  Requested for: 02HGH6271 Ordered   28  Ventolin  (90 Base) MCG/ACT Inhalation Aerosol Solution; INHALE 1 TO 2    PUFFS EVERY 4 TO 6 HOURS AS NEEDED; Therapy: 27UHA9548 to [phone])  Requested for: 28MAM4629; Last    Rx:23Jan2015 Ordered    Allergies    1  Augmentin TABS   2  Claritin CAPS   3  Darvon-N TABS   4  Depakote TBEC   5  Influenza Virus Vaccine Whole   6  Lithium   7  Singulair TABS   8   Topamax TABS    Signatures   Electronically signed by : Santo Rodriguez, ; Nov 9 2017 12:29PM EST                       (Author)

## 2018-01-15 NOTE — MISCELLANEOUS
Message     Recorded as Task   Date: 11/30/2017 01:53 PM, Created By: Lien Fuentes   Task Name: Call Back   Assigned To: [facility] 240   Regarding Patient: Arcadio July, Status: Active   Comment:    Ivelisse Kothari - 30 Nov 2017 1:53 PM     TASK CREATED  Patient called the office requesting uine results that we do not yet have  She states she is postponing urine test for her PCP until she has results from this one  Please call with results as soon as they're in  Ivelisse Kothari - 30 Nov 2017 5:03 PM     TASK EDITED  Spoke with pt; results were received  Test showed growth suggestive of urogenital/fecal alexander  Dr Ludy Cota doesn't want any prescriptions sent out at this time, but recommends a repeat culture  Pt  understands, she'll have it done tomorrow at [facility]  Active Problems    1  Abnormal weight gain (783 1) (R63 5)   2  Acute cystitis (595 0) (N30 00)   3  Acute left flank pain (789 09,338 19) (R10 9)   4  Acute urinary tract infection (599 0) (N39 0)   5  Albuminuria (791 0) (R80 9)   6  Allergic rhinitis (477 9) (J30 9)   7  Arthralgia (719 40) (M25 50)   8  Asthma (493 90) (J45 909)   9  Back pain (724 5) (M54 9)   10  Bipolar affective disorder (296 80) (F31 9)   11  Bipolar Disorder (296 00)   12  Bladder cancer (188 9) (C67 9)   13  Carpal tunnel syndrome, unspecified laterality (354 0) (G56 00)   14  Depression (311) (F32 9)   15  Disc degeneration, lumbar (722 52) (M51 36)   16  Dysuria (788 1) (R30 0)   17  Esophageal reflux (530 81) (K21 9)   18  Flank pain (789 09) (R10 9)   19  Hematuria (599 70) (R31 9)   20  Hernia (553 9) (K46 9)   21  Human papilloma virus infection (079 4) (B97 7)   22  Hyperlipidemia (272 4) (E78 5)   23  Hypertension (401 9) (I10)   24  Hypothyroidism (244 9) (E03 9)   25  Irritable bowel syndrome (564 1) (K58 9)   26  Mastoiditis, acute (383 00) (H70 009)   27  Nephrolithiasis (592 0) (N20 0)   28  Otitis media (382 9) (H66 90)   29   Proteinuria (791 0) (R80 9)   30  Recurrent streptococcal tonsillitis (034 0) (J03 01)   31  Simple partial seizures (345 50) (G40 109)   32  Sleep disorder (780 50) (G47 9)   33  Urinary tract infection (599 0) (N39 0)   34  Vaginal yeast infection (112 1) (B37 3)    Current Meds   1  Amaryl 1 MG Oral Tablet (Glimepiride); Therapy: (Recorded:54Wzk9837) to Recorded   2  Azelastine HCl - 0 15 % Nasal Solution (Astepro); 2 SPRAYS IN EACH NOSTRIL TWICE   DAILY  Requested for: 18FTS6783; Last Rx:23Jan2015 Ordered   3  Clotrimazole-Betamethasone 1-0 05 % External Cream (Lotrisone); APPLY A THIN   LAYER TO AFFECTED AREA(S) TWICE DAILY; Therapy: 75AED7654 to [phone])  Requested for: 78JIC3698; Last   Rx:23Jan2015 Ordered   4  Colace CAPS; Take 2 Capsules at Bedtime Recorded   5  Crestor 40 MG Oral Tablet (Rosuvastatin Calcium); take 1 tablet by mouth every day; Therapy: 42VHK8193 to Recorded   6  Effexor XR 75 MG Oral Capsule Extended Release 24 Hour (Venlafaxine HCl ER)   Recorded   7  Fluconazole 150 MG Oral Tablet (Diflucan); TAKE 1 TABLET ONCE  MAY REPEAT IN 2   DAYS ;   Therapy: 56Ugt9820 to (Last Rx:23Jpt7749)  Requested for: 01Sep2017 Ordered   8  Fluconazole 200 MG Oral Tablet (Diflucan); TAKE 1 TABLET 1 TIME ONLY; Therapy: 05JQB0047 to (Evaluate:43Thv9741)  Requested for: 76MNQ2836; Last   Rx:73Lyc5355 Ordered   9  Fluticasone Propionate 50 MCG/ACT Nasal Suspension (Flonase); INSTILL 1 SPRAY IN   EACH NOSTRIL TWICE DAILY; Therapy: 61UEL6750 to (Evaluate:06Gmi6293)  Requested for: 86Syp8616; Last   Rx:93Mqa7258 Ordered   10  FreeStyle Lancets Miscellaneous; USE THREE TIMES DAILY AS DIRECTED DX 55462; Therapy: 29HDF4344 to (Evaluate:53Emx1802)  Requested for: 88BIC8593; Last    WI:70RQI2107 Ordered   11  FreeStyle Lite Device; ONE GLUCOMETER MACHINE BID USE AS DIRECTED DX    98902; Therapy: 12GPR7121 to (Evaluate:20Jun2014)  Requested for: 56XEB4863; Last    RC:99VLL3401 Ordered   12   FreeStyle Lite Test In Vitro Strip; USE 1 STRIP 3 TIMES DAILY  DX 43645; Therapy: 93ENL6238 to (Evaluate:71Pkf0032)  Requested for: 52XBA9906; Last    OQ:59OBQ6979 Ordered   13  Januvia 100 MG Oral Tablet; Therapy: (Rea Hutson) to Recorded   14  KlonoPIN 0 5 MG Oral Tablet (ClonazePAM); Therapy: (Gilma Mejia) to Recorded   15  LamoTRIgine 200 MG Oral Tablet; TAKE 1 TABLET TWICE DAILY; Therapy: 38FGF9287 to (Evaluate:90Tds4714)  Requested for: 03JUG3521; Last    Rx:12Oct2017 Ordered   16  LamoTRIgine 25 MG Oral Tablet (LaMICtal); TAKE 1 (25MG) TABLET BY MOUTH TWICE    A DAY IN ADDITION TO 200MG TWICE DAILY; Therapy: 33Wir4353 to (Evaluate:24Urx6903)  Requested for: 54TTH1584; Last    Rx:02Mar2017 Ordered   17  Lipitor 40 MG Oral Tablet (Atorvastatin Calcium); Therapy: (Rea Hutson) to Recorded   18  Lotrisone 1-0 05 % LOTN (Clotrimazole-Betamethasone); Therapy: (Rea Hutson) to Recorded   19  Macrobid 100 MG Oral Capsule (Nitrofurantoin Monohyd Macro); TAKE 200 MG Twice    daily; Therapy: 50MKX8551 to Recorded   20  MetFORMIN HCl ER (OSM) 500 MG Oral Tablet Extended Release 24 Hour; take 1 tablet    twice daily as directed; Therapy: 88JJJ3002 to Recorded   21  Nitrofurantoin Monohyd Macro 100 MG Oral Capsule (Macrobid); TAKE 1 CAPSULE    TWICE DAILY UNTIL GONE;    Therapy: 95OIJ2777 to (Evaluate:16Nov2017)  Requested for: 87WJX5992; Last    Rx:09Nov2017 Ordered   22  Omeprazole 20 MG Oral Capsule Delayed Release; TAKE ONE CAPSULE EVERY DAY; Therapy: 72RZW4666 to (21 )  Requested for: 32TYQ8213; Last    Rx:12Mar2015 Ordered   23  Prandin 1 MG Oral Tablet (Repaglinide); take 4 mg as needed; Therapy: 00UHQ5924 to Recorded   24  Pravachol 40 MG Oral Tablet (Pravastatin Sodium); Therapy: (Rea Hutson) to Recorded   25  PriLOSEC 20 MG CPDR (Omeprazole); Therapy: (Rea Hutson) to Recorded   26   Pyridium 200 MG Oral Tablet; TAKE 1 TABLET 3 TIMES DAILY AS NEEDED FOR PAIN;    Therapy: 52VTN9747 to (Al Solders)  Requested for: 78NMP3871; Last    DN:06RWV5898 Ordered   27  Sulfamethoxazole-Trimethoprim 800-160 MG Oral Tablet (Bactrim DS); Take 1 tablet    BID for 10 days, then 1/2 tablet QD for 90 days; Therapy: 07PIN5330 to (Last Patterson Congo)  Requested for: 28QFL5904 Ordered   28  Ventolin  (90 Base) MCG/ACT Inhalation Aerosol Solution; INHALE 1 TO 2    PUFFS EVERY 4 TO 6 HOURS AS NEEDED; Therapy: 39YZQ7199 to [phone])  Requested for: 43LPR0541; Last    Rx:23Jan2015 Ordered    Allergies    1  Augmentin TABS   2  Claritin CAPS   3  Darvon-N TABS   4  Depakote TBEC   5  Influenza Virus Vaccine Whole   6  Lithium   7  Singulair TABS   8  Topamax TABS    Plan  Urinary tract infection    · (1) URINE CULTURE; Source:Urine, Clean Catch; Status:Active - Retrospective  Authorization;  Requested for:30Nov2017;     Signatures   Electronically signed by : Ludy Merida, ; Nov 30 2017  5:03PM EST                       (Author)

## 2018-01-16 NOTE — MISCELLANEOUS
Message   Recorded as Task   Date: 07/24/2017 02:21 PM, Created By: Silvana Xiao   Task Name: Call Back   Assigned To: Benigno FREEMAN,TEAM   Regarding Patient: Gemini Brooks, Status: Active   Comment:    Silvana Xiao - 24 Jul 2017 2:21 PM     TASK CREATED  Caller: Self; [phone] (Home); [phone] (Work)  Pt calling regarding BCG treatments  Not sure if she can start on Friday  [facility] was felling sick was given medication to calm her stomach and then starts her period next week  please advise [phone]   Toma Love - 24 Jul 2017 2:47 PM     TASK EDITED  Pt  to have culture done tomorrow and call Thurs  AM re: status  Will cancel at that time if pt  not well  Active Problems    1  Abnormal weight gain (783 1) (R63 5)   2  Acute left flank pain (789 09,338 19) (R10 9)   3  Albuminuria (791 0) (R80 9)   4  Allergic rhinitis (477 9) (J30 9)   5  Arthralgia (719 40) (M25 50)   6  Asthma (493 90) (J45 909)   7  Back pain (724 5) (M54 9)   8  Bipolar affective disorder (296 80) (F31 9)   9  Bipolar Disorder (296 00)   10  Bladder cancer (188 9) (C67 9)   11  Carpal tunnel syndrome, unspecified laterality (354 0) (G56 00)   12  Depression (311) (F32 9)   13  Disc degeneration, lumbar (722 52) (M51 36)   14  DMII (diabetes mellitus, type 2) (250 00) (E11 9)   15  Dysuria (788 1) (R30 0)   16  Esophageal reflux (530 81) (K21 9)   17  Flank pain (789 09) (R10 9)   18  Hematuria (599 70) (R31 9)   19  Hernia (553 9) (K46 9)   20  Human papilloma virus infection (079 4) (B97 7)   21  Hyperlipidemia (272 4) (E78 5)   22  Hypertension (401 9) (I10)   23  Hypothyroidism (244 9) (E03 9)   24  Irritable bowel syndrome (564 1) (K58 9)   25  Mastoiditis, acute (383 00) (H70 009)   26  Nephrolithiasis (592 0) (N20 0)   27  Otitis media (382 9) (H66 90)   28  Proteinuria (791 0) (R80 9)   29  Recurrent streptococcal tonsillitis (034 0) (J03 01)   30  Simple partial seizures (345 50) (G40 109)   31  Sleep disorder (780 50) (G47 9)   32  Urinary tract infection (599 0) (N39 0)   33  Vaginal yeast infection (112 1) (B37 3)    Current Meds   1  Azelastine HCl - 0 15 % Nasal Solution (Astepro); 2 SPRAYS IN EACH NOSTRIL TWICE   DAILY  Requested for: 52NZU8727; Last Rx:23Jan2015 Ordered   2  Clotrimazole-Betamethasone 1-0 05 % External Cream (Lotrisone); APPLY A THIN   LAYER TO AFFECTED AREA(S) TWICE DAILY; Therapy: 95HKY0421 to [phone])  Requested for: 30ZWY8594; Last   Rx:23Jan2015 Ordered   3  Colace CAPS; Take 2 Capsules at Bedtime Recorded   4  Crestor 40 MG Oral Tablet (Rosuvastatin Calcium); take 1 tablet by mouth every day; Therapy: 75IXH6228 to Recorded   5  Effexor XR 75 MG Oral Capsule Extended Release 24 Hour (Venlafaxine HCl ER)   Recorded   6  Fluticasone Propionate 50 MCG/ACT Nasal Suspension (Flonase); INSTILL 1 SPRAY IN   EACH NOSTRIL TWICE DAILY; Therapy: 01ETF9013 to (Evaluate:73Egc0022)  Requested for: 45Wdz0440; Last   Rx:13Pbi8805 Ordered   7  FreeStyle Lancets Miscellaneous; USE THREE TIMES DAILY AS DIRECTED DX 15592; Therapy: 16OCK3166 to (Evaluate:72Ojn5040)  Requested for: 16ZNV0275; Last   XO:63JKF1944 Ordered   8  FreeStyle Lite Device; ONE GLUCOMETER MACHINE BID USE AS DIRECTED DX   81923; Therapy: 13CSB5596 to (Evaluate:76Whv2138)  Requested for: 58BZG4357; Last   PA:07WNJ0891 Ordered   9  FreeStyle Lite Test In Vitro Strip; USE 1 STRIP 3 TIMES DAILY  DX 20416; Therapy: 94SZA7111 to (Evaluate:72Rxy2872)  Requested for: 27PMQ6650; Last   BL:60QYG7792 Ordered   10  KlonoPIN 0 5 MG Oral Tablet (ClonazePAM); Therapy: (Verba Presser) to Recorded   11  LamoTRIgine 200 MG Oral Tablet; TAKE 1 TABLET TWICE DAILY; Therapy: 18KOW9202 to (Evaluate:97Kez0374)  Requested for: 09VDS6347; Last    Rx:02Mar2017 Ordered   12   LamoTRIgine 25 MG Oral Tablet (LaMICtal); TAKE 1 (25MG) TABLET BY MOUTH TWICE    A DAY IN ADDITION TO 200MG TWICE DAILY; Therapy: 17Mtn8941 to (Evaluate:83Xpn5809)  Requested for: 54ACU5553; Last    Rx:02Mar2017 Ordered   13  Macrobid 100 MG Oral Capsule (Nitrofurantoin Monohyd Macro); TAKE 200 MG Twice    daily; Therapy: 77OYA4224 to Recorded   14  MetFORMIN HCl ER (OSM) 500 MG Oral Tablet Extended Release 24 Hour; take 1 tablet    twice daily as directed; Therapy: 30BAF8675 to Recorded   15  Omeprazole 20 MG Oral Capsule Delayed Release; TAKE ONE CAPSULE EVERY DAY; Therapy: 13BYJ6851 to [phone])  Requested for: 97CAT8009; Last    Rx:12Mar2015 Ordered   16  Prandin 1 MG Oral Tablet (Repaglinide); take 4 mg as needed; Therapy: 18NMW7897 to Recorded   17  Pyridium 200 MG Oral Tablet; TAKE 1 TABLET 3 TIMES DAILY AS NEEDED FOR PAIN;    Therapy: 43KYA6443 to (Joe Cipro)  Requested for: 98BEU1936; Last    JR:[id number] Ordered   18  Ventolin  (90 Base) MCG/ACT Inhalation Aerosol Solution; INHALE 1 TO 2    PUFFS EVERY 4 TO 6 HOURS AS NEEDED; Therapy: 81GIC4631 to [phone])  Requested for: 71QQN2185; Last    Rx:23Jan2015 Ordered    Allergies    1  Augmentin TABS   2  Claritin CAPS   3  Darvocet A500 TABS   4  Darvon-N TABS   5  Influenza Virus Vaccine Whole   6   Singulair TABS    Signatures   Electronically signed by : Magda Meredith RN; Jul 24 2017  2:47PM EST                       (Author)

## 2018-01-16 NOTE — MISCELLANEOUS
Message   Recorded as Task   Date: 11/09/2017 12:02 PM, Created By: Mikala Saldaña   Task Name: Medical Complaint Callback   Assigned To: Benigno Dallas,TEAM   Regarding Patient: Nancy Osorio, Status: In Progress   Comment:    Maria Elena Garcia - 09 Nov 2017 12:02 PM     TASK CREATED  Caller: Self; Medical Complaint; [phone] (Home); [phone] (Work)  PATIENT CALLED SAYING SHE FINSIHED HER ANTIBIOTCS AND SHE STILL HAS THE INFECTION Susana Maria Teresain HER BACK AT HOME #   Genita Colder - 09 Nov 2017 12:29 PM     TASK EDITED  lmom to call back  Lilliana Valencia - 09 Nov 2017 12:29 PM     TASK IN PROGRESS   Genita Colder - 09 Nov 2017 4:27 PM     TASK EDITED  MACROBID BID X7DAYS PRESCRIBED PER DR Leandro Banks  Active Problems    1  Abnormal weight gain (783 1) (R63 5)   2  Acute cystitis (595 0) (N30 00)   3  Acute left flank pain (789 09,338 19) (R10 9)   4  Acute urinary tract infection (599 0) (N39 0)   5  Albuminuria (791 0) (R80 9)   6  Allergic rhinitis (477 9) (J30 9)   7  Arthralgia (719 40) (M25 50)   8  Asthma (493 90) (J45 909)   9  Back pain (724 5) (M54 9)   10  Bipolar affective disorder (296 80) (F31 9)   11  Bipolar Disorder (296 00)   12  Bladder cancer (188 9) (C67 9)   13  Carpal tunnel syndrome, unspecified laterality (354 0) (G56 00)   14  Depression (311) (F32 9)   15  Disc degeneration, lumbar (722 52) (M51 36)   16  Dysuria (788 1) (R30 0)   17  Esophageal reflux (530 81) (K21 9)   18  Flank pain (789 09) (R10 9)   19  Hematuria (599 70) (R31 9)   20  Hernia (553 9) (K46 9)   21  Human papilloma virus infection (079 4) (B97 7)   22  Hyperlipidemia (272 4) (E78 5)   23  Hypertension (401 9) (I10)   24  Hypothyroidism (244 9) (E03 9)   25  Irritable bowel syndrome (564 1) (K58 9)   26  Mastoiditis, acute (383 00) (H70 009)   27  Nephrolithiasis (592 0) (N20 0)   28  Otitis media (382 9) (H66 90)   29  Proteinuria (791 0) (R80 9)   30   Recurrent streptococcal tonsillitis (034 0) (J03 01)   31  Simple partial seizures (345 50) (G40 109)   32  Sleep disorder (780 50) (G47 9)   33  Urinary tract infection (599 0) (N39 0)   34  Vaginal yeast infection (112 1) (B37 3)    Current Meds   1  Amaryl 1 MG Oral Tablet (Glimepiride); Therapy: (Recorded:06Yxj3569) to Recorded   2  Azelastine HCl - 0 15 % Nasal Solution (Astepro); 2 SPRAYS IN EACH NOSTRIL TWICE   DAILY  Requested for: 53BTZ2738; Last Rx:23Jan2015 Ordered   3  Clotrimazole-Betamethasone 1-0 05 % External Cream (Lotrisone); APPLY A THIN   LAYER TO AFFECTED AREA(S) TWICE DAILY; Therapy: 21PXW1406 to [phone])  Requested for: 56ADL6408; Last   Rx:23Jan2015 Ordered   4  Colace CAPS; Take 2 Capsules at Bedtime Recorded   5  Crestor 40 MG Oral Tablet (Rosuvastatin Calcium); take 1 tablet by mouth every day; Therapy: 69IRH0186 to Recorded   6  Effexor XR 75 MG Oral Capsule Extended Release 24 Hour (Venlafaxine HCl ER)   Recorded   7  Fluconazole 150 MG Oral Tablet (Diflucan); TAKE 1 TABLET ONCE  MAY REPEAT IN 2   DAYS ;   Therapy: 39Yri8048 to (Last Rx:01Sep2017)  Requested for: 01Sep2017 Ordered   8  Fluconazole 200 MG Oral Tablet (Diflucan); TAKE 1 TABLET 1 TIME ONLY; Therapy: 96WFS5253 to (Evaluate:05Msd6068)  Requested for: 36MQF8939; Last   Rx:36Nzq7367 Ordered   9  Fluticasone Propionate 50 MCG/ACT Nasal Suspension (Flonase); INSTILL 1 SPRAY IN   EACH NOSTRIL TWICE DAILY; Therapy: 61KLG3133 to (Evaluate:81Fgg8840)  Requested for: 00Sqi3724; Last   Rx:29Wme0161 Ordered   10  FreeStyle Lancets Miscellaneous; USE THREE TIMES DAILY AS DIRECTED DX 27098; Therapy: 29FPZ6525 to (Evaluate:76Ycy8351)  Requested for: 01GRZ3061; Last    YR:84EWR4984 Ordered   11  FreeStyle Lite Device; ONE GLUCOMETER MACHINE BID USE AS DIRECTED DX    33294; Therapy: 96TKB8128 to (Evaluate:20Jun2014)  Requested for: 76LLX3439; Last    :47RYA3483 Ordered   12   FreeStyle Lite Test In Vitro Strip; USE 1 STRIP 3 TIMES DAILY DX 33398; Therapy: 15LIR5641 to (Evaluate:96Xdp0716)  Requested for: 13YLW6691; Last    BR:69KVI6298 Ordered   13  Januvia 100 MG Oral Tablet; Therapy: (Geoffry Dress) to Recorded   14  KlonoPIN 0 5 MG Oral Tablet (ClonazePAM); Therapy: (Markso Nozak) to Recorded   15  LamoTRIgine 200 MG Oral Tablet; TAKE 1 TABLET TWICE DAILY; Therapy: 36DCY9541 to (Evaluate:82Fpc4568)  Requested for: 76MVV7824; Last    Rx:12Oct2017 Ordered   16  LamoTRIgine 25 MG Oral Tablet (LaMICtal); TAKE 1 (25MG) TABLET BY MOUTH TWICE    A DAY IN ADDITION TO 200MG TWICE DAILY; Therapy: 84Rgg4164 to (Evaluate:90Efg8128)  Requested for: 05EMM6526; Last    Rx:02Mar2017 Ordered   17  Lipitor 40 MG Oral Tablet (Atorvastatin Calcium); Therapy: (Geoffry Dress) to Recorded   18  Lotrisone 1-0 05 % LOTN (Clotrimazole-Betamethasone); Therapy: (Geoffry Dress) to Recorded   19  Macrobid 100 MG Oral Capsule (Nitrofurantoin Monohyd Macro); TAKE 200 MG Twice    daily; Therapy: 78SHI1246 to Recorded   20  MetFORMIN HCl ER (OSM) 500 MG Oral Tablet Extended Release 24 Hour; take 1 tablet    twice daily as directed; Therapy: 21ORV2163 to Recorded   21  Nitrofurantoin Monohyd Macro 100 MG Oral Capsule; TAKE 1 CAPSULE TWICE DAILY    UNTIL GONE;    Therapy: 90QWR0405 to (Evaluate:36Bsq9375)  Requested for: 61Uzt2575; Last    Rx:65Jbx4576 Ordered   22  Omeprazole 20 MG Oral Capsule Delayed Release; TAKE ONE CAPSULE EVERY DAY; Therapy: 73TTG6906 to ([phone])  Requested for: 43ORX1873; Last    Rx:12Mar2015 Ordered   23  Prandin 1 MG Oral Tablet (Repaglinide); take 4 mg as needed; Therapy: 72JMC2184 to Recorded   24  Pravachol 40 MG Oral Tablet (Pravastatin Sodium); Therapy: (Geoffry Dress) to Recorded   25  PriLOSEC 20 MG CPDR (Omeprazole); Therapy: (Geoffry Dress) to Recorded   26   Pyridium 200 MG Oral Tablet; TAKE 1 TABLET 3 TIMES DAILY AS NEEDED FOR PAIN;    Therapy: 10TIR4871 to (Chandni Grissom)  Requested for: 51BRH3390; Last    FX:46RHI2121 Ordered   27  Sulfamethoxazole-Trimethoprim 800-160 MG Oral Tablet (Bactrim DS); Take 1 tablet    BID for 10 days, then 1/2 tablet QD for 90 days; Therapy: 94AFK2324 to ([facility])  Requested for: 57KHL2395 Ordered   28  Ventolin  (90 Base) MCG/ACT Inhalation Aerosol Solution; INHALE 1 TO 2    PUFFS EVERY 4 TO 6 HOURS AS NEEDED; Therapy: 65AJN5399 to [phone])  Requested for: 27ESP0771; Last    Rx:23Jan2015 Ordered    Allergies    1  Augmentin TABS   2  Claritin CAPS   3  Darvon-N TABS   4  Depakote TBEC   5  Influenza Virus Vaccine Whole   6  Lithium   7  Singulair TABS   8   Topamax TABS    Plan  Acute cystitis, Acute urinary tract infection    · Nitrofurantoin Monohyd Macro 100 MG Oral Capsule (Macrobid); TAKE 1  CAPSULE TWICE DAILY UNTIL GONE    Signatures   Electronically signed by : Gonzalo Matos, ; Nov 9 2017  4:27PM EST                       (Author)

## 2018-01-16 NOTE — MISCELLANEOUS
Message  Every[employer]  Spoke to Cathie Razo at [place] [address] facility does not need Prior Auth  LMOM for pt  to call to speak to me re: setting up BCG Instillations  Active Problems    1  Abnormal weight gain (783 1) (R63 5)   2  Acute left flank pain (789 09,338 19) (R10 9)   3  Albuminuria (791 0) (R80 9)   4  Allergic rhinitis (477 9) (J30 9)   5  Arthralgia (719 40) (M25 50)   6  Asthma (493 90) (J45 909)   7  Back pain (724 5) (M54 9)   8  Bipolar affective disorder (296 80) (F31 9)   9  Bipolar Disorder (296 00)   10  Bladder cancer (188 9) (C67 9)   11  Carpal tunnel syndrome, unspecified laterality (354 0) (G56 00)   12  Depression (311) (F32 9)   13  Disc degeneration, lumbar (722 52) (M51 36)   14  DMII (diabetes mellitus, type 2) (250 00) (E11 9)   15  Dysuria (788 1) (R30 0)   16  Esophageal reflux (530 81) (K21 9)   17  Flank pain (789 09) (R10 9)   18  Hematuria (599 70) (R31 9)   19  Hernia (553 9) (K46 9)   20  Human papilloma virus infection (079 4) (B97 7)   21  Hyperlipidemia (272 4) (E78 5)   22  Hypertension (401 9) (I10)   23  Hypothyroidism (244 9) (E03 9)   24  Irritable bowel syndrome (564 1) (K58 9)   25  Mastoiditis, acute (383 00) (H70 009)   26  Nephrolithiasis (592 0) (N20 0)   27  Otitis media (382 9) (H66 90)   28  Proteinuria (791 0) (R80 9)   29  Recurrent streptococcal tonsillitis (034 0) (J03 01)   30  Simple partial seizures (345 50) (G40 109)   31  Sleep disorder (780 50) (G47 9)   32  Urinary tract infection (599 0) (N39 0)   33  Vaginal yeast infection (112 1) (B37 3)    Current Meds   1  Azelastine HCl - 0 15 % Nasal Solution (Astepro); 2 SPRAYS IN EACH NOSTRIL TWICE   DAILY  Requested for: 21CVQ4778; Last Rx:23Jan2015 Ordered   2  Clotrimazole-Betamethasone 1-0 05 % External Cream (Lotrisone); APPLY A THIN   LAYER TO AFFECTED AREA(S) TWICE DAILY; Therapy: 66UPX6648 to 958 08 922)  Requested for: 44NVJ1675; Last   Rx:23Jan2015 Ordered   3  Colace CAPS;  Take 2 Capsules at Bedtime Recorded   4  Crestor 40 MG Oral Tablet (Rosuvastatin Calcium); take 1 tablet by mouth every day; Therapy: 98RGM0930 to Recorded   5  Effexor XR 75 MG Oral Capsule Extended Release 24 Hour (Venlafaxine HCl ER)   Recorded   6  Fluticasone Propionate 50 MCG/ACT Nasal Suspension (Flonase); INSTILL 1 SPRAY IN   EACH NOSTRIL TWICE DAILY; Therapy: 46PPS2521 to (Evaluate:33Ekw6467)  Requested for: 75Qms5535; Last   Rx:28Bmg5587 Ordered   7  FreeStyle Lancets Miscellaneous; USE THREE TIMES DAILY AS DIRECTED DX 46443; Therapy: 07OLL9792 to (Evaluate:22Lfm1620)  Requested for: 69ZBK9532; Last   MJ:81DUA3945 Ordered   8  FreeStyle Lite Device; ONE GLUCOMETER MACHINE BID USE AS DIRECTED DX   55867; Therapy: 58VAL8153 to (Evaluate:52Rjw2215)  Requested for: 98OHJ1154; Last   EA:85DWH4590 Ordered   9  FreeStyle Lite Test In Vitro Strip; USE 1 STRIP 3 TIMES DAILY  DX 04303; Therapy: 98CVR2367 to (Evaluate:31Fmx9019)  Requested for: 41OTW6352; Last   AH:40ORC9983 Ordered   10  KlonoPIN 0 5 MG Oral Tablet (ClonazePAM); Therapy: (Riesa Ganser) to Recorded   11  LamoTRIgine 200 MG Oral Tablet; TAKE 1 TABLET TWICE DAILY; Therapy: 79RRC9232 to (Evaluate:31Yxm4694)  Requested for: 98UTS5669; Last    Rx:02Mar2017 Ordered   12  LamoTRIgine 25 MG Oral Tablet (LaMICtal); TAKE 1 (25MG) TABLET BY MOUTH TWICE    A DAY IN ADDITION TO 200MG TWICE DAILY; Therapy: 26Hne5626 to (Evaluate:34Uie9268)  Requested for: 63LYJ3967; Last    Rx:02Mar2017 Ordered   13  Macrobid 100 MG Oral Capsule (Nitrofurantoin Monohyd Macro); TAKE 200 MG Twice    daily; Therapy: 30GWT6945 to Recorded   14  MetFORMIN HCl ER (OSM) 500 MG Oral Tablet Extended Release 24 Hour; take 1 tablet    twice daily as directed; Therapy: 03PJW4556 to Recorded   15  Omeprazole 20 MG Oral Capsule Delayed Release; TAKE ONE CAPSULE EVERY DAY; Therapy: 38HMW4216 to (Elizabeth Farris)  Requested for: 24NGS2386;  Last    Rx:12Mar2015 Ordered   16  Prandin 1 MG Oral Tablet (Repaglinide); take 4 mg as needed; Therapy: 04HGY9011 to Recorded   17  Pyridium 200 MG Oral Tablet; TAKE 1 TABLET 3 TIMES DAILY AS NEEDED FOR PAIN;    Therapy: 73VFU4352 to (Alva Winkler)  Requested for: 26KKK7850; Last    CU:40TAJ0352 Ordered   18  Ventolin  (90 Base) MCG/ACT Inhalation Aerosol Solution; INHALE 1 TO 2    PUFFS EVERY 4 TO 6 HOURS AS NEEDED; Therapy: 13QCW6807 to [phone])  Requested for: 68KUN9194; Last    Rx:23Jan2015 Ordered    Allergies    1  Augmentin TABS   2  Claritin CAPS   3  Darvocet A500 TABS   4  Darvon-N TABS   5  Influenza Virus Vaccine Whole   6   Singulair TABS    Signatures   Electronically signed by : Jeanne Haney RN; Jul 20 2017  9:19AM EST                       (Author)

## 2018-01-17 NOTE — MISCELLANEOUS
Message   Recorded as Task   Date: 07/28/2017 08:57 AM, Created By: Kyra Pastrana   Task Name: Call Back   Assigned To: Benigno FREEMAN,TEAM   Regarding Patient: Nika Harper, Status: Active   Comment:    Kyra Pastrana - 28 Jul 2017 8:57 AM     TASK CREATED  Caller: Self; [phone] (Home)  Pt was to get BCG treatment today has a bladder infection and had to cancel  She would like a nurse to call back  [address] 28 Jul 2017 9:10 AM     TASK EDITED  Culture pos  given to Dr Alla Warren to address  Pt is aware  Called [facility] to cancel today's appt  Spoke to Christian Chacon and cancelled  Active Problems    1  Abnormal weight gain (783 1) (R63 5)   2  Acute left flank pain (789 09,338 19) (R10 9)   3  Albuminuria (791 0) (R80 9)   4  Allergic rhinitis (477 9) (J30 9)   5  Arthralgia (719 40) (M25 50)   6  Asthma (493 90) (J45 909)   7  Back pain (724 5) (M54 9)   8  Bipolar affective disorder (296 80) (F31 9)   9  Bipolar Disorder (296 00)   10  Bladder cancer (188 9) (C67 9)   11  Carpal tunnel syndrome, unspecified laterality (354 0) (G56 00)   12  Depression (311) (F32 9)   13  Disc degeneration, lumbar (722 52) (M51 36)   14  DMII (diabetes mellitus, type 2) (250 00) (E11 9)   15  Dysuria (788 1) (R30 0)   16  Esophageal reflux (530 81) (K21 9)   17  Flank pain (789 09) (R10 9)   18  Hematuria (599 70) (R31 9)   19  Hernia (553 9) (K46 9)   20  Human papilloma virus infection (079 4) (B97 7)   21  Hyperlipidemia (272 4) (E78 5)   22  Hypertension (401 9) (I10)   23  Hypothyroidism (244 9) (E03 9)   24  Irritable bowel syndrome (564 1) (K58 9)   25  Mastoiditis, acute (383 00) (H70 009)   26  Nephrolithiasis (592 0) (N20 0)   27  Otitis media (382 9) (H66 90)   28  Proteinuria (791 0) (R80 9)   29  Recurrent streptococcal tonsillitis (034 0) (J03 01)   30  Simple partial seizures (345 50) (G40 109)   31  Sleep disorder (780 50) (G47 9)   32   Urinary tract infection (599 0) (N39 0)   33  Vaginal yeast infection (112 1) (B37 3)    Current Meds   1  Azelastine HCl - 0 15 % Nasal Solution (Astepro); 2 SPRAYS IN EACH NOSTRIL TWICE   DAILY  Requested for: 20TWU7245; Last Rx:23Jan2015 Ordered   2  Clotrimazole-Betamethasone 1-0 05 % External Cream (Lotrisone); APPLY A THIN   LAYER TO AFFECTED AREA(S) TWICE DAILY; Therapy: 97HXW3167 to [phone])  Requested for: 28XBS3934; Last   Rx:23Jan2015 Ordered   3  Colace CAPS; Take 2 Capsules at Bedtime Recorded   4  Crestor 40 MG Oral Tablet (Rosuvastatin Calcium); take 1 tablet by mouth every day; Therapy: 51VTT3089 to Recorded   5  Effexor XR 75 MG Oral Capsule Extended Release 24 Hour (Venlafaxine HCl ER)   Recorded   6  Fluticasone Propionate 50 MCG/ACT Nasal Suspension (Flonase); INSTILL 1 SPRAY IN   EACH NOSTRIL TWICE DAILY; Therapy: 62YMC8925 to (Evaluate:28Ala3647)  Requested for: 65Dzh6000; Last   Rx:11Sep2015 Ordered   7  FreeStyle Lancets Miscellaneous; USE THREE TIMES DAILY AS DIRECTED DX 68211; Therapy: 98OWQ3170 to (Evaluate:18Zec1710)  Requested for: 36JWI2653; Last   TI:61UOT7750 Ordered   8  FreeStyle Lite Device; ONE GLUCOMETER MACHINE BID USE AS DIRECTED DX   35096; Therapy: 17IVP9224 to (Evaluate:20Jun2014)  Requested for: 22CSM7557; Last   SR:94ILJ8016 Ordered   9  FreeStyle Lite Test In Vitro Strip; USE 1 STRIP 3 TIMES DAILY  DX 58610; Therapy: 89JDB9247 to (Evaluate:38Pvq8637)  Requested for: 21LAK8496; Last   MN:74UIK7747 Ordered   10  KlonoPIN 0 5 MG Oral Tablet (ClonazePAM); Therapy: (Dione Garcia) to Recorded   11  LamoTRIgine 200 MG Oral Tablet; TAKE 1 TABLET TWICE DAILY; Therapy: 33XQM5946 to (Evaluate:59Lxr5411)  Requested for: 39BWW1196; Last    Rx:02Mar2017 Ordered   12  LamoTRIgine 25 MG Oral Tablet (LaMICtal); TAKE 1 (25MG) TABLET BY MOUTH TWICE    A DAY IN ADDITION TO 200MG TWICE DAILY;     Therapy: 11Ywp6120 to (Evaluate:82Him5370)  Requested for: 92ZSQ9225; Last Rx:02Mar2017 Ordered   13  Macrobid 100 MG Oral Capsule (Nitrofurantoin Monohyd Macro); TAKE 200 MG Twice    daily; Therapy: 72GJA1074 to Recorded   14  MetFORMIN HCl ER (OSM) 500 MG Oral Tablet Extended Release 24 Hour; take 1 tablet    twice daily as directed; Therapy: 52LZS2772 to Recorded   15  Omeprazole 20 MG Oral Capsule Delayed Release; TAKE ONE CAPSULE EVERY DAY; Therapy: 96XFM1879 to ([phone])  Requested for: 39QVZ2643; Last    Rx:12Mar2015 Ordered   16  Prandin 1 MG Oral Tablet (Repaglinide); take 4 mg as needed; Therapy: 14PBH6929 to Recorded   17  Pyridium 200 MG Oral Tablet; TAKE 1 TABLET 3 TIMES DAILY AS NEEDED FOR PAIN;    Therapy: 25ZJB3056 to (Luis Angel Pedersen)  Requested for: 65EAU7119; Last    LX:23XQT9833 Ordered   18  Ventolin  (90 Base) MCG/ACT Inhalation Aerosol Solution; INHALE 1 TO 2    PUFFS EVERY 4 TO 6 HOURS AS NEEDED; Therapy: 87EUA5784 to [phone])  Requested for: 94DEH8224; Last    Rx:23Jan2015 Ordered    Allergies    1  Augmentin TABS   2  Claritin CAPS   3  Darvocet A500 TABS   4  Darvon-N TABS   5  Influenza Virus Vaccine Whole   6   Singulair TABS    Signatures   Electronically signed by : Kelli Euceda RN; Jul 28 2017  9:10AM EST                       (Author)

## 2018-01-18 NOTE — MISCELLANEOUS
Message   Recorded as Task   Date: 09/19/2017 09:59 AM, Created By: Jesica Weri   Task Name: Call Back   Assigned To: Benigno Dallas,TEAM   Regarding Patient: Ganesh Moore, Status: In Progress   Terri Heath - 19 Sep 2017 9:59 AM     TASK CREATED  Left message for pt to return call  Pt received call for r/s appts for Dr Isela Jolley and was complaining of symptoms of a UTI  Jesica Weir - 19 Sep 2017 9:59 AM     TASK IN PROGRESS   Viola Grimm - 19 Sep 2017 10:48 AM     TASK EDITED  Pt returned call and stated that she is having the burning, discomfort  Pt denies fever or chills  Pt was sent for a  Urine culture and will await results  Active Problems    1  Abnormal weight gain (783 1) (R63 5)   2  Acute cystitis (595 0) (N30 00)   3  Acute left flank pain (789 09,338 19) (R10 9)   4  Albuminuria (791 0) (R80 9)   5  Allergic rhinitis (477 9) (J30 9)   6  Arthralgia (719 40) (M25 50)   7  Asthma (493 90) (J45 909)   8  Back pain (724 5) (M54 9)   9  Bipolar affective disorder (296 80) (F31 9)   10  Bipolar Disorder (296 00)   11  Bladder cancer (188 9) (C67 9)   12  Carpal tunnel syndrome, unspecified laterality (354 0) (G56 00)   13  Depression (311) (F32 9)   14  Disc degeneration, lumbar (722 52) (M51 36)   15  DMII (diabetes mellitus, type 2) (250 00) (E11 9)   16  Dysuria (788 1) (R30 0)   17  Esophageal reflux (530 81) (K21 9)   18  Flank pain (789 09) (R10 9)   19  Hematuria (599 70) (R31 9)   20  Hernia (553 9) (K46 9)   21  Human papilloma virus infection (079 4) (B97 7)   22  Hyperlipidemia (272 4) (E78 5)   23  Hypertension (401 9) (I10)   24  Hypothyroidism (244 9) (E03 9)   25  Irritable bowel syndrome (564 1) (K58 9)   26  Mastoiditis, acute (383 00) (H70 009)   27  Nephrolithiasis (592 0) (N20 0)   28  Otitis media (382 9) (H66 90)   29  Proteinuria (791 0) (R80 9)   30  Recurrent streptococcal tonsillitis (034 0) (J03 01)   31   Simple partial seizures (345 50) (G40 109)   32  Sleep disorder (780 50) (G47 9)   33  Urinary tract infection (599 0) (N39 0)   34  Vaginal yeast infection (112 1) (B37 3)    Current Meds   1  Azelastine HCl - 0 15 % Nasal Solution (Astepro); 2 SPRAYS IN EACH NOSTRIL TWICE   DAILY  Requested for: 74BHW9169; Last Rx:23Jan2015 Ordered   2  Clotrimazole-Betamethasone 1-0 05 % External Cream (Lotrisone); APPLY A THIN   LAYER TO AFFECTED AREA(S) TWICE DAILY; Therapy: 07FRL2028 to 958 08 922)  Requested for: 36AUU9844; Last   Rx:23Jan2015 Ordered   3  Colace CAPS; Take 2 Capsules at Bedtime Recorded   4  Crestor 40 MG Oral Tablet (Rosuvastatin Calcium); take 1 tablet by mouth every day; Therapy: 49UEP5067 to Recorded   5  Effexor XR 75 MG Oral Capsule Extended Release 24 Hour (Venlafaxine HCl ER)   Recorded   6  Fluconazole 150 MG Oral Tablet (Diflucan); TAKE 1 TABLET ONCE  MAY REPEAT IN 2   DAYS ;   Therapy: 68Kao9642 to (Last Rx:84Yyo4628)  Requested for: 30Yqx7816 Ordered   7  Fluticasone Propionate 50 MCG/ACT Nasal Suspension (Flonase); INSTILL 1 SPRAY IN   EACH NOSTRIL TWICE DAILY; Therapy: 00HRO9612 to (Evaluate:54Ivv4146)  Requested for: 46Yjy4552; Last   Rx:81Wxk4274 Ordered   8  FreeStyle Lancets Miscellaneous; USE THREE TIMES DAILY AS DIRECTED DX 49690; Therapy: 71VXR8914 to (Evaluate:19Vmy5220)  Requested for: 28CSX3861; Last   VQ:71MZK1563 Ordered   9  FreeStyle Lite Device; ONE GLUCOMETER MACHINE BID USE AS DIRECTED DX   67822; Therapy: 09XCW8989 to (Evaluate:77Bgc1488)  Requested for: 51XBT1959; Last   PU:61TNG7617 Ordered   10  FreeStyle Lite Test In Vitro Strip; USE 1 STRIP 3 TIMES DAILY  DX 70633; Therapy: 57XBL7285 to (Evaluate:02Srh9975)  Requested for: 55HEZ2349; Last    IJ:36OQD8054 Ordered   11  KlonoPIN 0 5 MG Oral Tablet (ClonazePAM); Therapy: (Naheed Urena) to Recorded   12  LamoTRIgine 200 MG Oral Tablet; TAKE 1 TABLET TWICE DAILY;     Therapy: 49QFW0790 to (Evaluate:54Rkh3440)  Requested for: 92MLC8459; Last    Rx:02Mar2017 Ordered   13  LamoTRIgine 25 MG Oral Tablet (LaMICtal); TAKE 1 (25MG) TABLET BY MOUTH TWICE    A DAY IN ADDITION TO 200MG TWICE DAILY; Therapy: 98Zuj7989 to (Evaluate:86Rnb3478)  Requested for: 48CDN8394; Last    Rx:02Mar2017 Ordered   14  Macrobid 100 MG Oral Capsule (Nitrofurantoin Monohyd Macro); TAKE 200 MG Twice    daily; Therapy: 97HYD9426 to Recorded   15  MetFORMIN HCl ER (OSM) 500 MG Oral Tablet Extended Release 24 Hour; take 1 tablet    twice daily as directed; Therapy: 76TVD8378 to Recorded   16  Nitrofurantoin Monohyd Macro 100 MG Oral Capsule (Macrobid); TAKE 1 CAPSULE    TWICE DAILY UNTIL GONE;    Therapy: 58RFJ4002 to (Evaluate:31Czm0199)  Requested for: 85QCV4375; Last    Rx:01Lwg1964 Ordered   17  Omeprazole 20 MG Oral Capsule Delayed Release; TAKE ONE CAPSULE EVERY DAY; Therapy: 65IXH4697 to ([phone])  Requested for: 94YQY9036; Last    Rx:12Mar2015 Ordered   18  Prandin 1 MG Oral Tablet (Repaglinide); take 4 mg as needed; Therapy: 85FBG3509 to Recorded   19  Pyridium 200 MG Oral Tablet; TAKE 1 TABLET 3 TIMES DAILY AS NEEDED FOR PAIN;    Therapy: 14HZF0655 to (Donzell Radha)  Requested for: 50JQV8753; Last    IC:41BKL8592 Ordered   20  Ventolin  (90 Base) MCG/ACT Inhalation Aerosol Solution; INHALE 1 TO 2    PUFFS EVERY 4 TO 6 HOURS AS NEEDED; Therapy: 82ZKI0839 to [phone])  Requested for: 03NWG6212; Last    Rx:23Jan2015 Ordered    Allergies    1  Augmentin TABS   2  Claritin CAPS   3  Darvocet A500 TABS   4  Darvon-N TABS   5  Influenza Virus Vaccine Whole   6  Singulair TABS    Plan  Urinary tract infection    · (1) URINE CULTURE; Source:Urine, Clean Catch; Status:Active - Retrospective  Authorization;  Requested QRU:41KGI2493;     Signatures   Electronically signed by : Shannon Key, ; Sep 19 2017 10:48AM EST                       (Author)

## 2018-01-23 VITALS
BODY MASS INDEX: 38.99 KG/M2 | DIASTOLIC BLOOD PRESSURE: 80 MMHG | SYSTOLIC BLOOD PRESSURE: 126 MMHG | HEIGHT: 65 IN | WEIGHT: 234 LBS

## 2018-01-30 ENCOUNTER — TELEPHONE (OUTPATIENT)
Dept: NEUROLOGY | Facility: CLINIC | Age: 43
End: 2018-01-30

## 2018-02-21 LAB — HBA1C MFR BLD HPLC: 8.3 %

## 2018-03-02 NOTE — PROGRESS NOTES
[facility]             Pls let pt know WBCs slightly elevated   Any recent infection?  Should follow with PCP      I spoke to pt who states she has a cold and was seen by PCP 3 days ago

## 2018-03-17 DIAGNOSIS — G40.109 SIMPLE PARTIAL SEIZURE DISORDER (HCC): Primary | ICD-10-CM

## 2018-03-19 ENCOUNTER — TELEPHONE (OUTPATIENT)
Dept: NEUROLOGY | Facility: CLINIC | Age: 43
End: 2018-03-19

## 2018-03-19 RX ORDER — LAMOTRIGINE 200 MG/1
TABLET ORAL
Qty: 180 TABLET | Refills: 1 | Status: SHIPPED | OUTPATIENT
Start: 2018-03-19 | End: 2018-07-14 | Stop reason: SDUPTHER

## 2018-03-19 NOTE — TELEPHONE ENCOUNTER
----- Message from Myah Mcfarlane MD sent at 3/19/2018  5:35 AM EDT -----  Just refilled pt's lamictal   Please make sure pt has a follow up appt with jorden as pt not seen in one yr

## 2018-03-20 ENCOUNTER — OFFICE VISIT (OUTPATIENT)
Dept: NEUROLOGY | Facility: CLINIC | Age: 43
End: 2018-03-20
Payer: COMMERCIAL

## 2018-03-20 VITALS
HEART RATE: 88 BPM | SYSTOLIC BLOOD PRESSURE: 143 MMHG | HEIGHT: 66 IN | BODY MASS INDEX: 38.47 KG/M2 | DIASTOLIC BLOOD PRESSURE: 70 MMHG | WEIGHT: 239.4 LBS

## 2018-03-20 DIAGNOSIS — G40.109 SIMPLE PARTIAL EPILEPSY (HCC): Primary | ICD-10-CM

## 2018-03-20 PROCEDURE — 99214 OFFICE O/P EST MOD 30 MIN: CPT | Performed by: PSYCHIATRY & NEUROLOGY

## 2018-03-20 RX ORDER — BUPROPION HYDROCHLORIDE 75 MG/1
37.5 TABLET ORAL DAILY
COMMUNITY
Start: 2017-11-07 | End: 2018-08-07 | Stop reason: ALTCHOICE

## 2018-03-20 RX ORDER — AZELASTINE 1 MG/ML
1 SPRAY, METERED NASAL
COMMUNITY
Start: 2017-01-03 | End: 2018-03-20 | Stop reason: SDUPTHER

## 2018-03-20 RX ORDER — LANCETS 23 GAUGE
EACH MISCELLANEOUS
COMMUNITY
Start: 2016-07-21

## 2018-03-20 RX ORDER — CLOTRIMAZOLE AND BETAMETHASONE DIPROPIONATE 10; .5 MG/ML; MG/ML
LOTION TOPICAL
COMMUNITY
End: 2018-03-20 | Stop reason: SDUPTHER

## 2018-03-20 RX ORDER — CLOTRIMAZOLE AND BETAMETHASONE DIPROPIONATE 10; .64 MG/G; MG/G
CREAM TOPICAL
COMMUNITY
Start: 2017-07-26 | End: 2018-10-05

## 2018-03-20 RX ORDER — DOCUSATE SODIUM 100 MG/1
2 CAPSULE, LIQUID FILLED ORAL
COMMUNITY
End: 2018-10-05

## 2018-03-20 RX ORDER — FLUCONAZOLE 150 MG/1
1 TABLET ORAL
COMMUNITY
Start: 2017-09-01 | End: 2018-10-05

## 2018-03-20 NOTE — PATIENT INSTRUCTIONS
Pt here for neuro folllow up  Last seen one yr ago  Pt denies any breakthru sz activity  Pt remains on lamictal at 225 mg bid  Pt has been seeing psychiatry  Pt had been seeing dr Shannon Collins for 23 yrs, who left practice jan 2018  Pt found new psychriatrist for 2 months and this dr discontinued her effexor of 9 yrs and started wellbutrin as pt also wanted to stop smoking  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  Pt is now going back to dr Shannon Collins and being tapered off the welbutrin and back on effexor  Pt just saw her long standing doc last week  Pt had updated lamictal level of 4 7 done in feb 2018  Pt remains on her 225 mg bid  Pt had episode of syncope while on toilet on jan 3rd 2018  Pt had the flu, diarrhea and passed out and hit head on sink  Pt did not go to there er at that time  One week later she went to the er and had facial contusion and likely mild concussion  Pt had ct head and told negative, rev from care everywhere portal and found the er visit from 1/16/18  Pt to have repeat lamictal level in about 3 months to compare

## 2018-03-20 NOTE — ASSESSMENT & PLAN NOTE
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  Pt is now going back to dr Maycol Chavis and being tapered off the welbutrin and back on effexor  Pt just saw her long standing doc last week  Pt had updated lamictal level of 4 7 done in feb 2018  Pt remains on her 225 mg bid  Pt had episode of syncope while on toilet on jan 3rd 2018  Pt had the flu, diarrhea and passed out and hit head on sink  Pt did not go to there er at that time  One week later she went to the er and had facial contusion and likely mild concussion  Pt had ct head and told negative, rev from care everywhere portal and found the er visit from 1/16/18  Pt to have repeat lamictal level in about 3 months to compare

## 2018-03-20 NOTE — PROGRESS NOTES
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  Pt is now going back to dr Raymon Macedo and being tapered off the welbutrin and back on effexor  Pt just saw her long standing doc last week  Pt had updated lamictal level of 4 7 done in feb 2018  Pt remains on her 225 mg bid  Pt had episode of syncope while on toilet on jan 3rd 2018  Pt had the flu, diarrhea and passed out and hit head on sink  Pt did not go to there er at that time  One week later she went to the er and had facial contusion and likely mild concussion  Pt had ct head and told negative, rev from care everywhere portal and found the er visit from 1/16/18  Pt to have repeat lamictal level in about 3 months to compare       Diagnoses and all orders for this visit:    Simple partial epilepsy ([address] )  -     Lamotrigine level; Future  -     Comprehensive metabolic panel; Future    Other orders  -     Discontinue: azelastine (ASTELIN) 0 1 % nasal spray; 1 spray into each nostril  -     glucose blood (FREESTYLE INSULINX TEST) test strip; Freestyle Lite test strips  Test 8XD  -     buPROPion (WELLBUTRIN) 75 mg tablet; Take 37 5 mg by mouth daily    -     clotrimazole-betamethasone (LOTRISONE) 1-0 05 % cream; Apply topically  -     docusate sodium (COLACE) 100 mg capsule; Take 2 capsules by mouth  -     fluconazole (DIFLUCAN) 150 mg tablet; Take 1 tablet by mouth  -     Lancets 28G MISC; Freestyle Lancets  Test 8XD    -     Discontinue: clotrimazole-betamethasone (LOTRISONE) 1-0 05 % lotion; Apply topically  -     Multiple Vitamins-Minerals (MULTIVITAMIN GUMMIES ADULT PO); Take 1 capsule by mouth              Subjective:    HPI  HPI: 42 y/o female presents for follow up of seizures, last seen one yr ago  Pt has PMH significant for bipolar disorder, insomnia, diabetes, and seizures  Re rev prior notes pertinent to her presentation today  As per prior note from jorden, pt was previously on Topamax but weaned off due to kidney stones and concentration issues  Pt was also on VPA but developed gynecomastia  She has been on Lamictal for a few years now, tolerating well  Pt feels Lamictal has also helped with her mood  At time of 2013 visit, there was a dosage increase due to questionable abnormal 24 hr EEG in 2/2014  Pt has remained on 225mg BID  Pt had repeat 24 EEG in 4/2014 which was normal   Pt  was diagnosed with bladder cancer in June 2015  She had a mass removed from her bladder and chemotherapy through a catheter  She has since had a total of 4 surgeries  She reports the most recent surgery was in May 2017  Pt has had bcg in the past and developed sepsis  Pt is followed by routine every 4 month cystoscopies  Pt reports mutliple courses of utis as well giovanny after procedures  Currently no abx  Pt notes she had a total of 2 passing out episodes in the past 2 yrs  One was associated with pain shortly after one of her surgeries while straining for stool  Pt more recently had another episode on the toilet again in janette 3 2018  Pt notes she also has had ibs for many yrs  She notes she awakening with bad grumbling abdominal feeling  Pt was concerned she was going to have diarrhea and got to the bathroom  Pt passed out in bathroom while having bm  Pt recalls events and spouse helping her  Pt notes she hit her head on the sink at that time but did not seek any attn  Pt later went about one week later to [facility]  I reviewed thru care everywhere the er trip on jan 16  Pt had ct head nl    Pt was told she had mild concussion  No ha or fever or chills currently  No further or new changes in her bowel or bladder  No loss of consciousness or zoning  No incontinence  Pt is very faithful about follow up for her bladder cancer  Pt also seeing her psychiatrist regularly as well  Pt had been seeing dr Tal King for 23 yrs, who left practice jan 2018  Pt found new psychriatrist for 2 months and this dr discontinued her effexor of 9 yrs and started wellbutrin as pt also wanted to stop smoking  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  Pt is now going back to dr Tal King and being tapered off the welbutrin and back on effexor  Pt just saw her long standing doc last week  Pt had updated lamictal level of 4 7 done in feb 2018  Rev levels back to 2014  Rev with pt there may be fluctuation in levels due to timing of when she is taking med in relation to the blood draw  The latest one from feb  Pt took her am lamictal but did not do labs to right after work around [address] typically takes her second dose of lamictal around 5pm   Pt had labs done just prior to her second dose of the day  rec pt ot repeat level in about 3 months as she was concerned about the difference in readings  We will try to do at same time of day  Pt has not had any breakthru sz and again remains compliant with her lamictal both from sz and bipolar standpoint  This med has not been adjusted by psychiatry recently either  Pt remains on her 225 mg bid  Patient also follows with endocrinology for diabetic management  She denies missing any doses  She reports she has been seizure free  She denies current plans for pregnancy, but has always considered pregnancy in the future  She understands she would need to let us know, as well as psych, if she is trying to conceive, and will need to see a perinatologist  She reports she has had this conversation with our office in the past      Total time spent today 30 minutes   Greater than 50% of total time was spent with the patient and / or family counseling and / or coordination of care        The following portions of the patient's history were reviewed and updated as appropriate: allergies, current medications, past family history, past medical history, past social history, past surgical history and problem list          Objective:    Blood pressure 143/70, pulse 88, height 5' 5 5" (1 664 m), weight 109 kg (239 lb 6 4 oz)  Physical Exam   Constitutional: She appears well-developed and well-nourished  Eyes: EOM are normal  Pupils are equal, round, and reactive to light  Cardiovascular: Intact distal pulses  Neurological: She has normal strength and normal reflexes  Gait normal    Psychiatric: Her speech is normal        Neurological Exam    Mental Status  The patient is alert  Her speech is normal  Her language is fluent with no aphasia  She has normal attention span and concentration  She has a normal fund of knowledge  Cranial Nerves    CN II: The patient's visual acuity and visual fields are normal   CN III, IV, VI: The patient's pupils are equally round and reactive to light and ocular movements are normal   CN V: The patient has normal facial sensation  CN VII:  The patient has symmetric facial movement  CN VIII:  The patient's hearing is normal   CN IX, X: The patient has symmetric palate movement and normal gag reflex  CN XI: The patient's shoulder shrug strength is normal   CN XII: The patient's tongue is midline without atrophy or fasciculations  Motor  The patient has normal muscle bulk throughout  Her overall muscle tone is normal throughout  Her strength is 5/5 throughout all four extremities  Sensory  The patient's sensation is normal in all four extremities  Reflexes  Deep tendon reflexes are 2+ and symmetric in all four extremities with downgoing toes bilaterally  Neg babinski     Gait and Coordination  The patient has normal gait and station   She has normal right finger to nose and normal left finger to nose coordination  ROS:    Review of Systems   Constitutional: Negative  Negative for appetite change and fever  HENT: Positive for sore throat  Negative for hearing loss, tinnitus, trouble swallowing and voice change  Eyes: Positive for pain (itchy)  Negative for photophobia  Respiratory: Negative  Negative for shortness of breath  Cardiovascular: Negative  Negative for palpitations  Gastrointestinal: Positive for nausea  Negative for vomiting  Endocrine: Negative  Negative for cold intolerance and heat intolerance  Genitourinary: Negative  Negative for dysuria, frequency and urgency  Loss of bladder control   Musculoskeletal: Negative  Negative for myalgias and neck pain  Skin: Negative  Negative for rash  Neurological: Positive for dizziness and light-headedness  Negative for tremors, seizures, syncope, facial asymmetry, speech difficulty, weakness, numbness and headaches  Hematological: Negative  Does not bruise/bleed easily  Psychiatric/Behavioral: Positive for sleep disturbance  Negative for confusion and hallucinations  The patient is nervous/anxious

## 2018-04-05 ENCOUNTER — DOCUMENTATION (OUTPATIENT)
Dept: UROLOGY | Facility: MEDICAL CENTER | Age: 43
End: 2018-04-05

## 2018-04-05 DIAGNOSIS — R56.9 SEIZURE (HCC): Primary | ICD-10-CM

## 2018-04-05 RX ORDER — LAMOTRIGINE 25 MG/1
TABLET ORAL
Qty: 180 TABLET | Refills: 3 | Status: SHIPPED | OUTPATIENT
Start: 2018-04-05 | End: 2019-02-12 | Stop reason: SDUPTHER

## 2018-04-05 NOTE — PROGRESS NOTES
Out of network authorization is pending per Ann Marie Shore @ [facility] - for 4/12/18 office visit, continue of patients care  Ref # [id number]

## 2018-04-12 ENCOUNTER — PROCEDURE VISIT (OUTPATIENT)
Dept: UROLOGY | Facility: MEDICAL CENTER | Age: 43
End: 2018-04-12
Payer: COMMERCIAL

## 2018-04-12 VITALS
WEIGHT: 237 LBS | HEIGHT: 66 IN | BODY MASS INDEX: 38.09 KG/M2 | DIASTOLIC BLOOD PRESSURE: 88 MMHG | SYSTOLIC BLOOD PRESSURE: 138 MMHG

## 2018-04-12 DIAGNOSIS — C67.0 MALIGNANT NEOPLASM OF TRIGONE OF URINARY BLADDER (HCC): Primary | ICD-10-CM

## 2018-04-12 LAB
SL AMB  POCT GLUCOSE, UA: ABNORMAL
SL AMB LEUKOCYTE ESTERASE,UA: NEGATIVE
SL AMB POCT BILIRUBIN,UA: NEGATIVE
SL AMB POCT BLOOD,UA: NEGATIVE
SL AMB POCT CLARITY,UA: ABNORMAL
SL AMB POCT COLOR,UA: YELLOW
SL AMB POCT KETONES,UA: NEGATIVE
SL AMB POCT NITRITE,UA: NEGATIVE
SL AMB POCT PH,UA: 5.5
SL AMB POCT SPECIFIC GRAVITY,UA: 1.01
SL AMB POCT URINE PROTEIN: ABNORMAL
SL AMB POCT UROBILINOGEN: 0.2

## 2018-04-12 PROCEDURE — 52000 CYSTOURETHROSCOPY: CPT | Performed by: UROLOGY

## 2018-04-12 PROCEDURE — 81003 URINALYSIS AUTO W/O SCOPE: CPT | Performed by: UROLOGY

## 2018-04-12 RX ORDER — CEFUROXIME AXETIL 500 MG/1
TABLET ORAL
Refills: 0 | COMMUNITY
Start: 2018-04-05 | End: 2018-08-07 | Stop reason: ALTCHOICE

## 2018-04-12 NOTE — PROGRESS NOTES
[address] [facility]  [facility]  [address] [facility]  [place], [address] [place] [address]  [phone]  www  [facility]  org      NAME: Poli Holloway  AGE: 43 y o  SEX: female  : 1975   MRN: [id number]    DATE: 2018  TIME: 1:44 PM    Assessment and Plan:  Bladder cancer, high-grade superficial, 2 occurrences  No evidence of disease on the cystoscopy  Follow-up in 3 months for another cystoscopy  Chief Complaint   No chief complaint on file  History of Present Illness   Here for surveillance cystoscopy for bladder cancer  As I have noted before, sheis not BCG candidate due to episodes of sepsis and recurrent UTI  She underwent TURBT 2017 for a pTa lesion, muscle was present specimen and uninvolved  The tumor was high-grade  She had mitomycin installation at that time  This was her first recurrence  She is doing well in terms of urinating  No recent bladder infections  Cystoscopy Procedure Note        Pre-operative Diagnosis:  Bladder cancer    Post-operative Diagnosis:  Bladder cancer, no evidence recurrence    Procedure: Flexible cystoscopy    Surgeon: Clifford Oviedo MD    Anesthesia: 1% Xylocaine per urethra    EBL: Minimal    Complications: none    Procedure Details   The risks, benefits, complications, treatment options, and expected outcomes were discussed with the patient  The patient concurred with the proposed plan, giving informed consent  Cystoscopy was performed today under local anesthesia, using sterile technique  The patient was placed in the dorsal lithotomy position, prepped with Betadine, and draped in the usual sterile fashion  The flexible cystocope was used to inspect both the urethra and bladder    Findings:  Urethra:  Normal without stricture  Bladder:  Smooth, not trabeculated and there were no stones tumors or other lesions  The orifices were orthotopic and intact  No recurrence    The left ureteral orifice has been resected and there was mild prolapse of the ureter itself             Specimens: none                 Complications:  None           Disposition: To home            Condition:  Stable          The following portions of the patient's history were reviewed and updated as appropriate: allergies, current medications, past family history, past medical history, past social history, past surgical history and problem list     Review of Systems   Review of Systems    Active Problem List     Patient Active Problem List   Diagnosis    Simple partial seizures ([address] )       Objective   /88 (BP Location: Right arm, Patient Position: Sitting)   Ht 5' 5 5" (1 664 m)   Wt 108 kg (237 lb)   BMI 38 84 kg/m²     Physical Exam        Current Medications     Current Outpatient Prescriptions:     acetaminophen (TYLENOL) 500 mg tablet, Take 1,000 mg by mouth every 6 (six) hours as needed for mild pain, Disp: , Rfl:     albuterol (PROVENTIL HFA,VENTOLIN HFA) 90 mcg/act inhaler, Inhale 2 puffs every 6 (six) hours as needed for shortness of breath, Disp: , Rfl:     atenolol (TENORMIN) 25 mg tablet, Take 25 mg by mouth every morning, Disp: , Rfl:     azelastine (ASTELIN) 0 1 % nasal spray, 2 sprays into each nostril 2 (two) times a day Use in each nostril as directed, Disp: , Rfl:     buPROPion (WELLBUTRIN) 75 mg tablet, Take 37 5 mg by mouth daily  , Disp: , Rfl:     clonazePAM (KlonoPIN) 0 5 mg tablet, Take 0 5 mg by mouth 3 (three) times a day, Disp: , Rfl:     clotrimazole (CLOTRIMAZOLE ANTI-FUNGAL) 1 % cream, Apply topically as needed, Disp: , Rfl:     clotrimazole-betamethasone (LOTRISONE) 1-0 05 % cream, Apply topically, Disp: , Rfl:     docusate sodium (COLACE) 100 mg capsule, Take 2 capsules by mouth, Disp: , Rfl:     fluconazole (DIFLUCAN) 150 mg tablet, Take 1 tablet by mouth, Disp: , Rfl:     fluticasone (FLONASE) 50 mcg/act nasal spray, 2 sprays into each nostril 2 (two) times a day, Disp: , Rfl:   glucose blood (FREESTYLE INSULINX TEST) test strip, Freestyle Lite test strips  Test 8XD  , Disp: , Rfl:     ibuprofen (MOTRIN) 200 mg tablet, Take 600 mg by mouth every 8 (eight) hours as needed for mild pain, Disp: , Rfl:     lamoTRIgine (LaMICtal) 200 MG tablet, TAKE 1 TABLET TWICE DAILY  , Disp: 180 tablet, Rfl: 1    lamoTRIgine (LaMICtal) 25 mg tablet, TAKE 1 (25MG) TABLET BY MOUTH TWICE A DAY IN ADDITION TO 200MG TWICE DAILY, Disp: 180 tablet, Rfl: 3    Lancets 28G MISC, Freestyle Lancets  Test 8XD  , Disp: , Rfl:     metFORMIN (GLUCOPHAGE-XR) 500 mg 24 hr tablet, Take 500 mg by mouth 2 (two) times a day, Disp: , Rfl:     Multiple Vitamins-Minerals (MULTIVITAMIN GUMMIES ADULT PO), Take 1 capsule by mouth, Disp: , Rfl:     nitrofurantoin (MACROBID) 100 mg capsule, Take 100 mg by mouth as needed, Disp: , Rfl:     omeprazole (PriLOSEC) 20 mg delayed release capsule, Take 20 mg by mouth every morning, Disp: , Rfl:     repaglinide (PRANDIN) 2 mg tablet, Take 2 mg by mouth as needed, Disp: , Rfl:     rosuvastatin (CRESTOR) 40 MG tablet, Take 40 mg by mouth, Disp: , Rfl:     sitaGLIPtin (JANUVIA) 100 mg tablet, Take 100 mg by mouth daily, Disp: , Rfl:     venlafaxine (EFFEXOR-XR) 75 mg 24 hr capsule, Take 75 mg by mouth every evening, Disp: , Rfl:         Jc Malhotra MD

## 2018-04-24 ENCOUNTER — TELEPHONE (OUTPATIENT)
Dept: NEUROLOGY | Facility: CLINIC | Age: 43
End: 2018-04-24

## 2018-04-24 NOTE — TELEPHONE ENCOUNTER
----- Message from Tiara Aparicio PA-C sent at 4/24/2018  8:55 AM EDT -----  Just got a result back on a Hb A1C on [place]  I did not order this  It also looks like this is a result from February? ?  Looks like she sees endocrinology for her DM care at [facility], so assuming they ordered this? We see her for seizures, so this was not ordered by our office and not sure why it was sent to me    Pls make sure this gets over the [facility] endocrinology, as it is abnormal

## 2018-04-24 NOTE — TELEPHONE ENCOUNTER
Pt states she gave permission through pt portal for endo and neuro to access records  Believes this is why you received result  She already saw endo and issue addressed

## 2018-06-26 ENCOUNTER — TELEPHONE (OUTPATIENT)
Dept: UROLOGY | Facility: MEDICAL CENTER | Age: 43
End: 2018-06-26

## 2018-06-26 DIAGNOSIS — N30.00 ACUTE CYSTITIS WITHOUT HEMATURIA: Primary | ICD-10-CM

## 2018-06-26 DIAGNOSIS — N30.00 ACUTE CYSTITIS WITHOUT HEMATURIA: ICD-10-CM

## 2018-06-26 RX ORDER — SULFAMETHOXAZOLE AND TRIMETHOPRIM 800; 160 MG/1; MG/1
1 TABLET ORAL EVERY 12 HOURS SCHEDULED
Qty: 14 TABLET | Refills: 0 | Status: SHIPPED | OUTPATIENT
Start: 2018-06-26 | End: 2018-07-03

## 2018-06-26 RX ORDER — SULFAMETHOXAZOLE AND TRIMETHOPRIM 800; 160 MG/1; MG/1
1 TABLET ORAL 2 TIMES DAILY
Qty: 21 TABLET | Refills: 0 | Status: SHIPPED | OUTPATIENT
Start: 2018-06-26 | End: 2018-06-26 | Stop reason: SDUPTHER

## 2018-06-26 NOTE — TELEPHONE ENCOUNTER
Patient is going away for 3 days training and has been on Macrobid for 4 days ,the [place] is not working this time  She has chills,no fever and no blood , she does have frequency and burning,she would like to be put on something else because she will be away

## 2018-06-26 NOTE — PROGRESS NOTES
Patient having frequency or urgency and dysuria, Macrobid not working-will changed to Bactrim double-strength 1 p o  b i d  for 7 days-sent to pharmacy

## 2018-07-13 ENCOUNTER — DOCUMENTATION (OUTPATIENT)
Dept: UROLOGY | Facility: MEDICAL CENTER | Age: 43
End: 2018-07-13

## 2018-07-14 DIAGNOSIS — G40.109 SIMPLE PARTIAL SEIZURE DISORDER (HCC): ICD-10-CM

## 2018-07-16 RX ORDER — LAMOTRIGINE 200 MG/1
TABLET ORAL
Qty: 180 TABLET | Refills: 1 | Status: SHIPPED | OUTPATIENT
Start: 2018-07-16 | End: 2019-03-26 | Stop reason: SDUPTHER

## 2018-07-24 ENCOUNTER — TELEPHONE (OUTPATIENT)
Dept: NEUROLOGY | Facility: CLINIC | Age: 43
End: 2018-07-24

## 2018-08-01 RX ORDER — EZETIMIBE 10 MG/1
10 TABLET ORAL DAILY
Refills: 3 | COMMUNITY
Start: 2018-07-14 | End: 2019-02-19 | Stop reason: ALTCHOICE

## 2018-08-07 ENCOUNTER — PROCEDURE VISIT (OUTPATIENT)
Dept: UROLOGY | Facility: MEDICAL CENTER | Age: 43
End: 2018-08-07
Payer: COMMERCIAL

## 2018-08-07 VITALS — BODY MASS INDEX: 37.77 KG/M2 | HEIGHT: 66 IN | WEIGHT: 235 LBS

## 2018-08-07 DIAGNOSIS — C67.0 MALIGNANT NEOPLASM OF TRIGONE OF URINARY BLADDER (HCC): Primary | ICD-10-CM

## 2018-08-07 LAB
SL AMB  POCT GLUCOSE, UA: NEGATIVE
SL AMB LEUKOCYTE ESTERASE,UA: NEGATIVE
SL AMB POCT BILIRUBIN,UA: NEGATIVE
SL AMB POCT BLOOD,UA: ABNORMAL
SL AMB POCT CLARITY,UA: CLEAR
SL AMB POCT COLOR,UA: YELLOW
SL AMB POCT KETONES,UA: NEGATIVE
SL AMB POCT NITRITE,UA: NEGATIVE
SL AMB POCT PH,UA: 5.5
SL AMB POCT SPECIFIC GRAVITY,UA: 1.02
SL AMB POCT URINE PROTEIN: ABNORMAL
SL AMB POCT UROBILINOGEN: 0.2

## 2018-08-07 PROCEDURE — 52000 CYSTOURETHROSCOPY: CPT | Performed by: UROLOGY

## 2018-08-07 PROCEDURE — 81003 URINALYSIS AUTO W/O SCOPE: CPT | Performed by: UROLOGY

## 2018-08-07 RX ORDER — SULFAMETHOXAZOLE AND TRIMETHOPRIM 800; 160 MG/1; MG/1
1 TABLET ORAL ONCE
Qty: 1 TABLET | Refills: 0 | Status: SHIPPED | OUTPATIENT
Start: 2018-08-07 | End: 2018-08-07

## 2018-08-07 NOTE — LETTER
2018     [place] White, [address] [facility]  [address] [facility] [address]    Patient: Tamera Elaine   YOB: 1975   Date of Visit: 2018       Dear Dr José Miguel Bennett: Thank you for referring Johnny Ram to me for evaluation  Below are my notes for this consultation  If you have questions, please do not hesitate to call me  I look forward to following your patient along with you  Sincerely,        Laury Linn MD        CC: No Recipients  Laury Linn MD  2018 10:32 AM  Sign at close encounter  [address] [facility]  [place] [phone]  www  [facility]  org      NAME: Tamera Elaine  AGE: 43 y o  SEX: female  : 1975   MRN: [id number]    DATE: 2018  TIME: 10:21 AM    Assessment and Plan:  Bladder cancer, no evidence of disease times 14 months  We can now stretch out her cystoscopies to every 6 months  Bactrim called into her pharmacy for prophylaxis  Follow-up 6 months with cystoscopy  Chief Complaint     Chief Complaint   Patient presents with    Cystoscopy       History of Present Illness   Bladder cancer:  Here for surveillance cystoscopy  She is not BCG candidate due to episodes of sepsis recurrent UTI  She underwent TURBT 2017 for a PTA lesion, muscles presents in the specimen and uninvolved  The tumor was high-grade  She had mitomycin instillation at that time  This was her first recurrence  She had a urinary tract infection  for which we called in Bactrim      Cystoscopy Procedure Note        Pre-operative Diagnosis:  Bladder cancer    Post-operative Diagnosis:  Same    Procedure: Flexible cystoscopy    Surgeon: Amy Buck MD    Anesthesia: 1% Xylocaine per urethra    EBL: Minimal    Complications: none    Procedure Details   The risks, benefits, complications, treatment options, and expected outcomes were discussed with the patient  The patient concurred with the proposed plan, giving informed consent  Cystoscopy was performed today under local anesthesia, using sterile technique  The patient was placed in the dorsal lithotomy position, prepped with Betadine, and draped in the usual sterile fashion  The flexible cystocope was used to inspect both the urethra and bladder    Findings:  Urethra:  Normal without stricture    Bladder:  Smooth, not trabeculated and there were no stones tumors or other lesions  The orifices were orthotopic and intact  No recurrence             Specimens:  None                 Complications:  None           Disposition: To home            Condition:  Stable          The following portions of the patient's history were reviewed and updated as appropriate: allergies, current medications, past family history, past medical history, past social history, past surgical history and problem list     Review of Systems   Review of Systems    Active Problem List     Patient Active Problem List   Diagnosis    Simple partial seizures ([facility] [address] )       Objective   Ht 5' 5 5" (1 664 m)   Wt 107 kg (235 lb)   BMI 38 51 kg/m²      Physical Exam        Current Medications     Current Outpatient Prescriptions:     acetaminophen (TYLENOL) 500 mg tablet, Take 1,000 mg by mouth every 6 (six) hours as needed for mild pain, Disp: , Rfl:     albuterol (PROVENTIL HFA,VENTOLIN HFA) 90 mcg/act inhaler, Inhale 2 puffs every 6 (six) hours as needed for shortness of breath, Disp: , Rfl:     atenolol (TENORMIN) 25 mg tablet, Take 25 mg by mouth every morning, Disp: , Rfl:     azelastine (ASTELIN) 0 1 % nasal spray, 2 sprays into each nostril 2 (two) times a day Use in each nostril as directed, Disp: , Rfl:     clonazePAM (KlonoPIN) 0 5 mg tablet, Take 0 5 mg by mouth 3 (three) times a day, Disp: , Rfl:     clotrimazole (CLOTRIMAZOLE ANTI-FUNGAL) 1 % cream, Apply topically as needed, Disp: , Rfl:     docusate sodium (COLACE) 100 mg capsule, Take 2 capsules by mouth, Disp: , Rfl:     ezetimibe (ZETIA) 10 mg tablet, Take 10 mg by mouth daily, Disp: , Rfl: 3    fluconazole (DIFLUCAN) 150 mg tablet, Take 1 tablet by mouth, Disp: , Rfl:     fluticasone (FLONASE) 50 mcg/act nasal spray, 2 sprays into each nostril 2 (two) times a day, Disp: , Rfl:     glucose blood (FREESTYLE INSULINX TEST) test strip, Freestyle Lite test strips  Test 8XD  , Disp: , Rfl:     ibuprofen (MOTRIN) 200 mg tablet, Take 600 mg by mouth every 8 (eight) hours as needed for mild pain, Disp: , Rfl:     lamoTRIgine (LaMICtal) 200 MG tablet, TAKE 1 TABLET TWICE DAILY  , Disp: 180 tablet, Rfl: 1    lamoTRIgine (LaMICtal) 25 mg tablet, TAKE 1 (25MG) TABLET BY MOUTH TWICE A DAY IN ADDITION TO 200MG TWICE DAILY, Disp: 180 tablet, Rfl: 3    Lancets 28G MISC, Freestyle Lancets  Test 8XD  , Disp: , Rfl:     metFORMIN (GLUCOPHAGE-XR) 500 mg 24 hr tablet, Take 1,000 mg by mouth 2 (two) times a day  , Disp: , Rfl:     Multiple Vitamins-Minerals (MULTIVITAMIN GUMMIES ADULT PO), Take 1 capsule by mouth, Disp: , Rfl:     omeprazole (PriLOSEC) 20 mg delayed release capsule, Take 20 mg by mouth every morning, Disp: , Rfl:     repaglinide (PRANDIN) 2 mg tablet, Take 2 mg by mouth 3 (three) times a day Take as directed , Disp: , Rfl:     rosuvastatin (CRESTOR) 40 MG tablet, Take 40 mg by mouth, Disp: , Rfl:     sitaGLIPtin (JANUVIA) 100 mg tablet, Take 100 mg by mouth daily, Disp: , Rfl:     venlafaxine (EFFEXOR-XR) 75 mg 24 hr capsule, Take 75 mg by mouth every evening, Disp: , Rfl:     clotrimazole-betamethasone (LOTRISONE) 1-0 05 % cream, Apply topically, Disp: , Rfl:     nitrofurantoin (MACROBID) 100 mg capsule, Take 100 mg by mouth as needed, Disp: , Rfl:         Ricarda Goodwin MD

## 2018-08-07 NOTE — PROGRESS NOTES
[address] [facility]  [facility]  [address] [facility] [place]  [place], [address] [place] [address]  [phone]  www  [facility]  org      NAME: Tino Luu  AGE: 43 y o  SEX: female  : 1975   MRN: [id number]    DATE: 2018  TIME: 10:21 AM    Assessment and Plan:  Bladder cancer, no evidence of disease times 14 months  We can now stretch out her cystoscopies to every 6 months  Bactrim called into her pharmacy for prophylaxis  Follow-up 6 months with cystoscopy  Chief Complaint     Chief Complaint   Patient presents with    Cystoscopy       History of Present Illness   Bladder cancer:  Here for surveillance cystoscopy  She is not BCG candidate due to episodes of sepsis recurrent UTI  She underwent TURBT 2017 for a PTA lesion, muscles presents in the specimen and uninvolved  The tumor was high-grade  She had mitomycin instillation at that time  This was her first recurrence  She had a urinary tract infection  for which we called in Bactrim  Cystoscopy Procedure Note        Pre-operative Diagnosis:  Bladder cancer    Post-operative Diagnosis:  Same    Procedure: Flexible cystoscopy    Surgeon: Joy Arriaga MD    Anesthesia: 1% Xylocaine per urethra    EBL: Minimal    Complications: none    Procedure Details   The risks, benefits, complications, treatment options, and expected outcomes were discussed with the patient  The patient concurred with the proposed plan, giving informed consent  Cystoscopy was performed today under local anesthesia, using sterile technique  The patient was placed in the dorsal lithotomy position, prepped with Betadine, and draped in the usual sterile fashion  The flexible cystocope was used to inspect both the urethra and bladder    Findings:  Urethra:  Normal without stricture    Bladder:  Smooth, not trabeculated and there were no stones tumors or other lesions  The orifices were orthotopic and intact   No recurrence  Specimens:  None                 Complications:  None           Disposition: To home            Condition:  Stable          The following portions of the patient's history were reviewed and updated as appropriate: allergies, current medications, past family history, past medical history, past social history, past surgical history and problem list     Review of Systems   Review of Systems    Active Problem List     Patient Active Problem List   Diagnosis    Simple partial seizures ([facility] [address] )       Objective   Ht 5' 5 5" (1 664 m)   Wt 107 kg (235 lb)   BMI 38 51 kg/m²     Physical Exam        Current Medications     Current Outpatient Prescriptions:     acetaminophen (TYLENOL) 500 mg tablet, Take 1,000 mg by mouth every 6 (six) hours as needed for mild pain, Disp: , Rfl:     albuterol (PROVENTIL HFA,VENTOLIN HFA) 90 mcg/act inhaler, Inhale 2 puffs every 6 (six) hours as needed for shortness of breath, Disp: , Rfl:     atenolol (TENORMIN) 25 mg tablet, Take 25 mg by mouth every morning, Disp: , Rfl:     azelastine (ASTELIN) 0 1 % nasal spray, 2 sprays into each nostril 2 (two) times a day Use in each nostril as directed, Disp: , Rfl:     clonazePAM (KlonoPIN) 0 5 mg tablet, Take 0 5 mg by mouth 3 (three) times a day, Disp: , Rfl:     clotrimazole (CLOTRIMAZOLE ANTI-FUNGAL) 1 % cream, Apply topically as needed, Disp: , Rfl:     docusate sodium (COLACE) 100 mg capsule, Take 2 capsules by mouth, Disp: , Rfl:     ezetimibe (ZETIA) 10 mg tablet, Take 10 mg by mouth daily, Disp: , Rfl: 3    fluconazole (DIFLUCAN) 150 mg tablet, Take 1 tablet by mouth, Disp: , Rfl:     fluticasone (FLONASE) 50 mcg/act nasal spray, 2 sprays into each nostril 2 (two) times a day, Disp: , Rfl:     glucose blood (FREESTYLE INSULINX TEST) test strip, Freestyle Lite test strips  Test 8XD  , Disp: , Rfl:     ibuprofen (MOTRIN) 200 mg tablet, Take 600 mg by mouth every 8 (eight) hours as needed for mild pain, Disp: , Rfl:     lamoTRIgine (LaMICtal) 200 MG tablet, TAKE 1 TABLET TWICE DAILY  , Disp: 180 tablet, Rfl: 1    lamoTRIgine (LaMICtal) 25 mg tablet, TAKE 1 (25MG) TABLET BY MOUTH TWICE A DAY IN ADDITION TO 200MG TWICE DAILY, Disp: 180 tablet, Rfl: 3    Lancets 28G MISC, Freestyle Lancets  Test 8XD  , Disp: , Rfl:     metFORMIN (GLUCOPHAGE-XR) 500 mg 24 hr tablet, Take 1,000 mg by mouth 2 (two) times a day  , Disp: , Rfl:     Multiple Vitamins-Minerals (MULTIVITAMIN GUMMIES ADULT PO), Take 1 capsule by mouth, Disp: , Rfl:     omeprazole (PriLOSEC) 20 mg delayed release capsule, Take 20 mg by mouth every morning, Disp: , Rfl:     repaglinide (PRANDIN) 2 mg tablet, Take 2 mg by mouth 3 (three) times a day Take as directed , Disp: , Rfl:     rosuvastatin (CRESTOR) 40 MG tablet, Take 40 mg by mouth, Disp: , Rfl:     sitaGLIPtin (JANUVIA) 100 mg tablet, Take 100 mg by mouth daily, Disp: , Rfl:     venlafaxine (EFFEXOR-XR) 75 mg 24 hr capsule, Take 75 mg by mouth every evening, Disp: , Rfl:     clotrimazole-betamethasone (LOTRISONE) 1-0 05 % cream, Apply topically, Disp: , Rfl:     nitrofurantoin (MACROBID) 100 mg capsule, Take 100 mg by mouth as needed, Disp: , Rfl:         Laury Linn MD

## 2018-10-05 ENCOUNTER — OFFICE VISIT (OUTPATIENT)
Dept: OBGYN CLINIC | Facility: MEDICAL CENTER | Age: 43
End: 2018-10-05
Payer: COMMERCIAL

## 2018-10-05 ENCOUNTER — APPOINTMENT (OUTPATIENT)
Dept: RADIOLOGY | Facility: MEDICAL CENTER | Age: 43
End: 2018-10-05
Payer: COMMERCIAL

## 2018-10-05 VITALS
HEART RATE: 83 BPM | DIASTOLIC BLOOD PRESSURE: 83 MMHG | WEIGHT: 235 LBS | BODY MASS INDEX: 37.77 KG/M2 | SYSTOLIC BLOOD PRESSURE: 119 MMHG | HEIGHT: 66 IN

## 2018-10-05 DIAGNOSIS — M25.551 RIGHT HIP PAIN: ICD-10-CM

## 2018-10-05 DIAGNOSIS — M75.82 ROTATOR CUFF TENDONITIS, LEFT: ICD-10-CM

## 2018-10-05 DIAGNOSIS — M25.512 BILATERAL SHOULDER PAIN, UNSPECIFIED CHRONICITY: ICD-10-CM

## 2018-10-05 DIAGNOSIS — M75.81 ROTATOR CUFF TENDONITIS, RIGHT: ICD-10-CM

## 2018-10-05 DIAGNOSIS — M25.512 CHRONIC LEFT SHOULDER PAIN: ICD-10-CM

## 2018-10-05 DIAGNOSIS — M70.61 TROCHANTERIC BURSITIS OF RIGHT HIP: ICD-10-CM

## 2018-10-05 DIAGNOSIS — G89.29 CHRONIC LEFT SHOULDER PAIN: ICD-10-CM

## 2018-10-05 DIAGNOSIS — M25.511 CHRONIC RIGHT SHOULDER PAIN: Primary | ICD-10-CM

## 2018-10-05 DIAGNOSIS — G89.29 CHRONIC RIGHT SHOULDER PAIN: Primary | ICD-10-CM

## 2018-10-05 DIAGNOSIS — M25.511 BILATERAL SHOULDER PAIN, UNSPECIFIED CHRONICITY: ICD-10-CM

## 2018-10-05 PROCEDURE — 73030 X-RAY EXAM OF SHOULDER: CPT

## 2018-10-05 PROCEDURE — 99204 OFFICE O/P NEW MOD 45 MIN: CPT | Performed by: ORTHOPAEDIC SURGERY

## 2018-10-05 PROCEDURE — 20610 DRAIN/INJ JOINT/BURSA W/O US: CPT | Performed by: ORTHOPAEDIC SURGERY

## 2018-10-05 PROCEDURE — 73502 X-RAY EXAM HIP UNI 2-3 VIEWS: CPT

## 2018-10-05 RX ORDER — LIDOCAINE HYDROCHLORIDE 10 MG/ML
2 INJECTION, SOLUTION INFILTRATION; PERINEURAL
Status: COMPLETED | OUTPATIENT
Start: 2018-10-05 | End: 2018-10-05

## 2018-10-05 RX ORDER — BUPIVACAINE HYDROCHLORIDE 2.5 MG/ML
2 INJECTION, SOLUTION INFILTRATION; PERINEURAL
Status: COMPLETED | OUTPATIENT
Start: 2018-10-05 | End: 2018-10-05

## 2018-10-05 RX ORDER — BETAMETHASONE SODIUM PHOSPHATE AND BETAMETHASONE ACETATE 3; 3 MG/ML; MG/ML
12 INJECTION, SUSPENSION INTRA-ARTICULAR; INTRALESIONAL; INTRAMUSCULAR; SOFT TISSUE
Status: COMPLETED | OUTPATIENT
Start: 2018-10-05 | End: 2018-10-05

## 2018-10-05 RX ADMIN — BUPIVACAINE HYDROCHLORIDE 2 ML: 2.5 INJECTION, SOLUTION INFILTRATION; PERINEURAL at 14:39

## 2018-10-05 RX ADMIN — BETAMETHASONE SODIUM PHOSPHATE AND BETAMETHASONE ACETATE 12 MG: 3; 3 INJECTION, SUSPENSION INTRA-ARTICULAR; INTRALESIONAL; INTRAMUSCULAR; SOFT TISSUE at 14:40

## 2018-10-05 RX ADMIN — LIDOCAINE HYDROCHLORIDE 2 ML: 10 INJECTION, SOLUTION INFILTRATION; PERINEURAL at 14:39

## 2018-10-05 RX ADMIN — LIDOCAINE HYDROCHLORIDE 2 ML: 10 INJECTION, SOLUTION INFILTRATION; PERINEURAL at 14:40

## 2018-10-05 RX ADMIN — BETAMETHASONE SODIUM PHOSPHATE AND BETAMETHASONE ACETATE 12 MG: 3; 3 INJECTION, SUSPENSION INTRA-ARTICULAR; INTRALESIONAL; INTRAMUSCULAR; SOFT TISSUE at 14:39

## 2018-10-05 RX ADMIN — BUPIVACAINE HYDROCHLORIDE 2 ML: 2.5 INJECTION, SOLUTION INFILTRATION; PERINEURAL at 14:40

## 2018-10-05 NOTE — PROGRESS NOTES
Assessment:  1  Chronic right shoulder pain  XR shoulder 2+ vw left    XR shoulder 2+ vw right    Large joint arthrocentesis    Ambulatory referral to Physical Therapy   2  Rotator cuff tendonitis, right  Large joint arthrocentesis    Ambulatory referral to Physical Therapy   3  Right hip pain  XR hip/pelv 2-3 vws right if performed    Large joint arthrocentesis   4  Trochanteric bursitis of right hip  Large joint arthrocentesis   5  Rotator cuff tendonitis, left  Large joint arthrocentesis    Ambulatory referral to Physical Therapy   6  Chronic left shoulder pain  Large joint arthrocentesis    Ambulatory referral to Physical Therapy       Plan:  X-rays reviewed and physical exam performed  Cortisone injections perforrmed to both subacromial spaces and right trochanteric bursa area  Referred to formal physical therapy for her shoulders at her request   ICE as indicated after today's injections  Activities as tolerated     To do next visit:  Return for re-check on an as needed basis  Scribe Attestation    I,:   Sandra Stevenson am acting as a scribe while in the presence of the attending physician :        I,:   Vadim Rey MD personally performed the services described in this documentation    as scribed in my presence :              Subjective:   Jarad Muñoz is a 37 y o  female who presents for evaluation of right greater than left shoulder pain and lateral based right hip pain  She was in the [facility] emergency room today and did not feel like waiting and called and got an appointment today with us  She states that she previously had injections to her right hip for bursitis however the records do not go back that far  She was treating at [place] [address]  for right shoulder rotator cuff injury from a work related injury in 2014 in which she only had physical therapy  She returns today with right greater than left shoulder pain that increases with use at or below shoulder level    She will at times get numbness or tingling from the base of her neck that will radiate down to her right forearm  She finds relief of her shoulder symptoms when her  massages her  She cannot lay comfortably on her right hip at night  Review of systems negative unless otherwise specified in HPI    Past Medical History:   Diagnosis Date    Asthma     Bladder CA in situ     Cystitis, chronic     Diabetes mellitus ([facility] [address] )     High cholesterol     Hypertension     Psychiatric disorder     Bipolar    Psychiatric disorder     Panic disorder       Past Surgical History:   Procedure Laterality Date    BLADDER SURGERY N/A     CHOLECYSTECTOMY      URETHRA SURGERY      1980    WISDOM TOOTH EXTRACTION         Family History   Problem Relation Age of Onset    Vascular Disease Mother     Depression Mother     Anxiety disorder Mother     Aneurysm Father     Bipolar disorder Brother     Prostate cancer Paternal Grandfather     Parkinsonism Paternal Grandfather     Alzheimer's disease Paternal Grandmother     Diabetes Family        Social History     Occupational History    Not on file       Social History Main Topics    Smoking status: Current Every Day Smoker     Packs/day: 1 50     Years: 16 00     Types: Cigarettes    Smokeless tobacco: Never Used    Alcohol use No    Drug use: No    Sexual activity: Not on file         Current Outpatient Prescriptions:     acetaminophen (TYLENOL) 500 mg tablet, Take 1,000 mg by mouth every 6 (six) hours as needed for mild pain, Disp: , Rfl:     albuterol (PROVENTIL HFA,VENTOLIN HFA) 90 mcg/act inhaler, Inhale 2 puffs every 6 (six) hours as needed for shortness of breath, Disp: , Rfl:     atenolol (TENORMIN) 25 mg tablet, Take 25 mg by mouth every morning, Disp: , Rfl:     azelastine (ASTELIN) 0 1 % nasal spray, 2 sprays into each nostril 2 (two) times a day Use in each nostril as directed, Disp: , Rfl:     clonazePAM (KlonoPIN) 0 5 mg tablet, Take 0 5 mg by mouth 3 (three) times a day, Disp: , Rfl:     clotrimazole (CLOTRIMAZOLE ANTI-FUNGAL) 1 % cream, Apply topically as needed, Disp: , Rfl:     ezetimibe (ZETIA) 10 mg tablet, Take 10 mg by mouth daily, Disp: , Rfl: 3    fluticasone (FLONASE) 50 mcg/act nasal spray, 2 sprays into each nostril 2 (two) times a day, Disp: , Rfl:     glucose blood (FREESTYLE INSULINX TEST) test strip, Freestyle Lite test strips  Test 8XD  , Disp: , Rfl:     ibuprofen (MOTRIN) 200 mg tablet, Take 600 mg by mouth every 8 (eight) hours as needed for mild pain, Disp: , Rfl:     lamoTRIgine (LaMICtal) 200 MG tablet, TAKE 1 TABLET TWICE DAILY  , Disp: 180 tablet, Rfl: 1    lamoTRIgine (LaMICtal) 25 mg tablet, TAKE 1 (25MG) TABLET BY MOUTH TWICE A DAY IN ADDITION TO 200MG TWICE DAILY, Disp: 180 tablet, Rfl: 3    Lancets 28G MISC, Freestyle Lancets  Test 8XD  , Disp: , Rfl:     metFORMIN (GLUCOPHAGE-XR) 500 mg 24 hr tablet, Take 1,000 mg by mouth 2 (two) times a day  , Disp: , Rfl:     Multiple Vitamins-Minerals (MULTIVITAMIN GUMMIES ADULT PO), Take 1 capsule by mouth, Disp: , Rfl:     nitrofurantoin (MACROBID) 100 mg capsule, Take 100 mg by mouth as needed, Disp: , Rfl:     omeprazole (PriLOSEC) 20 mg delayed release capsule, Take 20 mg by mouth every morning, Disp: , Rfl:     repaglinide (PRANDIN) 2 mg tablet, Take 2 mg by mouth 3 (three) times a day Take as directed , Disp: , Rfl:     sitaGLIPtin (JANUVIA) 100 mg tablet, Take 100 mg by mouth daily, Disp: , Rfl:     venlafaxine (EFFEXOR-XR) 75 mg 24 hr capsule, Take 75 mg by mouth every evening, Disp: , Rfl:     Allergies   Allergen Reactions    Lithium Other (See Comments)     Other reaction(s): Other (See Comments)  heart block  Heart block    Depakote [Valproic Acid] Other (See Comments)     Breast enlargement    Divalproex Sodium     Influenza Vaccines Swelling     Other reaction(s):  Other (See Comments)  GASTROENGINITIS  Arm swelling    Loratadine     Other      Other reaction(s): Unknown Reaction  Flu like symptons    Propoxyphene     Rosuvastatin Myalgia    Singulair [Montelukast] Hives    Topamax [Topiramate] Other (See Comments)     Kidney stones    Atorvastatin Rash    Simvastatin Rash     Acne            Vitals:    10/05/18 1349   BP: 119/83   Pulse: 83       Objective:          Physical Exam                    Right Hip Exam     Tenderness   The patient is experiencing tenderness in the greater trochanter  Range of Motion   The patient has normal right hip ROM  Muscle Strength   Abduction: 4/5   Adduction: 5/5   Flexion: 5/5     Other   Sensation: normal      Right Shoulder Exam     Tenderness   The patient is experiencing tenderness in the acromioclavicular joint (Anterior capsule, lateral cuff insertion)  Range of Motion   Active Abduction: 170   Forward Flexion: 170   External Rotation: 50 (With her arm at her side)     Muscle Strength   Right shoulder normal muscle strength: She has good strength of her rotator cuff muscles however has pain with resistance to supraspinatus muscle testing  Tests   Impingement: positive    Other   Erythema: absent  Sensation: normal      Left Shoulder Exam     Tenderness   Left shoulder tenderness location: Anterior capsule  Range of Motion   Active Abduction: 160   Forward Flexion: 160   External Rotation: 50 (With her arm at her side)     Muscle Strength   The patient has normal left shoulder strength  Tests   Impingement: positive    Other   Erythema: absent  Sensation: normal             Diagnostics, reviewed and taken today if performed as documented:     The attending physician has personally reviewed the pertinent films in PACS and interpretation is as follows:    X-rays of her right and left shoulders as well as her right hip and pelvis were taken today and the following findings are:  Both shoulders show very little glenohumeral joint arthritis if any with mild AC joint osteoarthritis a type 2 acromion bilaterally there were no fractures or dislocations at either shoulder  Her right hip and pelvis x-ray show moderate degenerative changes in the superior aspect of the articulating surfaces with a moderate-sized osteophytic density superior anterior to the hip joint          Procedures, if performed today:  Large joint arthrocentesis  Date/Time: 10/5/2018 2:39 PM  Consent given by: patient  Site marked: site marked  Supporting Documentation  Indications: pain   Procedure Details  Location: hip - R greater trochanteric bursa  Preparation: Patient was prepped and draped in the usual sterile fashion  Needle size: 22 G  Ultrasound guidance: no  Approach: lateral  Medications administered: 2 mL bupivacaine 0 25 %; 2 mL lidocaine 1 %; 12 mg betamethasone acetate-betamethasone sodium phosphate 6 (3-3) mg/mL    Patient tolerance: patient tolerated the procedure well with no immediate complications  Dressing:  Sterile dressing applied  Large joint arthrocentesis  Date/Time: 10/5/2018 2:39 PM  Consent given by: patient  Site marked: site marked  Supporting Documentation  Indications: pain   Procedure Details  Location: shoulder - R subacromial bursa  Preparation: Patient was prepped and draped in the usual sterile fashion  Needle size: 22 G  Ultrasound guidance: no  Medications administered: 2 mL bupivacaine 0 25 %; 2 mL lidocaine 1 %; 12 mg betamethasone acetate-betamethasone sodium phosphate 6 (3-3) mg/mL    Patient tolerance: patient tolerated the procedure well with no immediate complications  Dressing:  Sterile dressing applied  Large joint arthrocentesis  Date/Time: 10/5/2018 2:40 PM  Consent given by: patient  Site marked: site marked  Supporting Documentation  Indications: pain   Procedure Details  Location: shoulder - L subacromial bursa  Preparation: Patient was prepped and draped in the usual sterile fashion  Needle size: 22 G  Ultrasound guidance: no  Medications administered: 2 mL bupivacaine 0 25 %; 2 mL lidocaine 1 %; 12 mg betamethasone acetate-betamethasone sodium phosphate 6 (3-3) mg/mL    Patient tolerance: patient tolerated the procedure well with no immediate complications  Dressing:  Sterile dressing applied            Portions of the record may have been created with voice recognition software   Occasional wrong word or "sound a like" substitutions may have occurred due to the inherent limitations of voice recognition software   Read the chart carefully and recognize, using context, where substitutions have occurred

## 2018-10-05 NOTE — LETTER
October 5, 2018     Patient: Crecencio Meigs   YOB: 1975   Date of Visit: 10/5/2018       To Whom it May Concern:    Bcek Kuhn is under my professional care  She was seen in my office on 10/5/2018  She is excused from work today 10/05/2018 due to her orthopedic evaluation  If you have any questions or concerns, please don't hesitate to call           Sincerely,          Ross Hernández MD        CC: No Recipients

## 2018-10-15 ENCOUNTER — EVALUATION (OUTPATIENT)
Dept: PHYSICAL THERAPY | Facility: CLINIC | Age: 43
End: 2018-10-15
Payer: COMMERCIAL

## 2018-10-15 DIAGNOSIS — M75.82 ROTATOR CUFF TENDONITIS, LEFT: ICD-10-CM

## 2018-10-15 DIAGNOSIS — M75.81 ROTATOR CUFF TENDONITIS, RIGHT: ICD-10-CM

## 2018-10-15 DIAGNOSIS — M25.511 CHRONIC RIGHT SHOULDER PAIN: ICD-10-CM

## 2018-10-15 DIAGNOSIS — G89.29 CHRONIC LEFT SHOULDER PAIN: ICD-10-CM

## 2018-10-15 DIAGNOSIS — G89.29 CHRONIC RIGHT SHOULDER PAIN: ICD-10-CM

## 2018-10-15 DIAGNOSIS — M25.512 CHRONIC LEFT SHOULDER PAIN: ICD-10-CM

## 2018-10-15 PROCEDURE — 97162 PT EVAL MOD COMPLEX 30 MIN: CPT | Performed by: PHYSICAL THERAPIST

## 2018-10-15 PROCEDURE — G8985 CARRY GOAL STATUS: HCPCS | Performed by: PHYSICAL THERAPIST

## 2018-10-15 PROCEDURE — G8984 CARRY CURRENT STATUS: HCPCS | Performed by: PHYSICAL THERAPIST

## 2018-10-15 NOTE — PROGRESS NOTES
PT Evaluation     Today's date: 10/15/2018  Patient name: Otoniel Massey  : 1975  MRN: [id number]  Referring provider: Ari Carlson MD  Dx:   Encounter Diagnosis     ICD-10-CM    1  Chronic right shoulder pain M25 511 Ambulatory referral to Physical Therapy    G89 29    2  Rotator cuff tendonitis, right M75 81 Ambulatory referral to Physical Therapy   3  Rotator cuff tendonitis, left M75 82 Ambulatory referral to Physical Therapy   4  Chronic left shoulder pain M25 512 Ambulatory referral to Physical Therapy    G89 29                   Assessment  Impairments: abnormal or restricted ROM, activity intolerance, impaired physical strength, lacks appropriate home exercise program, pain with function, scapular dyskinesis and poor posture     Assessment details: Pt presents with s/s consistent with B subacromial impingement and RTC tendonitis  She also presents with significant pain-magnification and pain-rumination behavior as well as requires max cueing throughout her HEP performance for appropriate intensity, dosage, and pacing    Understanding of Dx/Px/POC: good   Prognosis: fair    Goals  STG - 4 wks  1) pt will be I with HEP  2) pt will demonstrate > 135 deg active elevation  3) pt will improve pain levels > 50%    LTG - 6-8 wks  1) pt will demonstrate > 150 deg active elevation  2) pt will demonstrate > 4-/5 B sh strength  3) pt will improve pain levels > 75%    Plan  Other planned modality interventions: high-level laser  Planned therapy interventions: joint mobilization, manual therapy, body mechanics training, neuromuscular re-education, postural training, strengthening, stretching, therapeutic activities, therapeutic exercise and home exercise program  Frequency: 2x week  Duration in weeks: 6  Treatment plan discussed with: patient        Subjective Evaluation    History of Present Illness  Mechanism of injury: Pt is a 37 y o  female with PMHx significant for bladder CA, depression, anxiety, bipolar disorder, asthma, DM II  She reports insidious onset of R > L shoulder pain and B posterior UE paraesthesias into the thumb 2 mos ago  She denies trauma or specific KYE  She reports constant 10/10 pain currently and relief for 10 days following B subacromial space cortisone injection 2 wks ago  Pain  Current pain rating: 10  At best pain rating: 10  At worst pain rating: 10    Social Support    Employment status: working    Diagnostic Tests  X-ray: normal  Treatments  Previous treatment: injection treatment  Patient Goals  Patient goals for therapy: decreased pain and independence with ADLs/IADLs          Objective     Active Range of Motion   Cervical/Thoracic Spine   Cervical    Flexion: 50 degrees   Extension: 45 degrees   Left lateral flexion: 40 degrees   Right lateral flexion: 45 degrees   Left rotation: 60 degrees   Right rotation: 60 degrees   Left Shoulder   Flexion: 120 degrees with pain  Abduction: 135 degrees with pain  External rotation BTH: C7 with pain  Internal rotation BTB: T6 with pain    Right Shoulder   Flexion: 90 degrees with pain  Abduction: 100 degrees with pain  External rotation BTH: Active external rotation behind the head: occiput   with pain  Internal rotation BTB: T9 with pain    Passive Range of Motion   Left Shoulder   Flexion: 150 degrees with pain  Abduction: 160 degrees with pain  External rotation 90°: 80 degrees with pain  Internal rotation 45°: 80 degrees with pain    Right Shoulder   Flexion: 150 degrees with pain  Abduction: 160 degrees with pain  External rotation 90°: 80 degrees with pain  Internal rotation 45°: 80 degrees with pain    Strength/Myotome Testing     Left Shoulder     Planes of Motion   Flexion: 3+   Abduction: 3+   External rotation at 0°: 3+   Internal rotation at 0°: 4-     Right Shoulder     Planes of Motion   Flexion: 3-   Abduction: 3-   External rotation at 0°: 3+   Internal rotation at 0°: 3+     Tests   Cervical     Left   Negative cervical distraction, Spurling's sign and cervical compression test       Left Shoulder   Positive Hawkin's  Negative belly press, drop arm and external rotation lag sign  Right Shoulder   Positive Hawkin's  Negative belly press, drop arm and external rotation lag sign  Precautions:  PMHx: depression, bipolar disorder, panic disorder, seizures, bladder CA s/p resection  Dx: B subacromial impingement, RTC tendonitis   Daily Treatment Diary     Manual  10/15            Laser: B RTC                                                                     Exercise Diary  10/15            Pulleys: FLEX, ABD             iso scap squeeze 5"x10            B sh TB rows             R sh TB ER Y/  1x5            L sh TB ER Y/  1x10            Wall slides: FLEX B/  1x10                                                                                                                                                                                                      Modalities

## 2018-10-17 ENCOUNTER — OFFICE VISIT (OUTPATIENT)
Dept: PHYSICAL THERAPY | Facility: CLINIC | Age: 43
End: 2018-10-17
Payer: COMMERCIAL

## 2018-10-17 DIAGNOSIS — M25.511 CHRONIC RIGHT SHOULDER PAIN: Primary | ICD-10-CM

## 2018-10-17 DIAGNOSIS — G89.29 CHRONIC RIGHT SHOULDER PAIN: Primary | ICD-10-CM

## 2018-10-17 PROCEDURE — 97112 NEUROMUSCULAR REEDUCATION: CPT | Performed by: PHYSICAL THERAPIST

## 2018-10-17 PROCEDURE — 97110 THERAPEUTIC EXERCISES: CPT | Performed by: PHYSICAL THERAPIST

## 2018-10-17 PROCEDURE — 97140 MANUAL THERAPY 1/> REGIONS: CPT | Performed by: PHYSICAL THERAPIST

## 2018-10-17 NOTE — PROGRESS NOTES
Daily Note     Today's date: 10/17/2018  Patient name: Coleen Dunn  : 1975  MRN: [id number]  Referring provider: Cody Sweeney MD  Dx:   Encounter Diagnosis     ICD-10-CM    1  Chronic right shoulder pain M25 511     G89 29        Start Time: 1100  Stop Time: 1140  Total time in clinic (min): 40 minutes    Subjective: The patient returns after IE reporting decreased pain rated 6/10  She notes compliance with her exercises despite forgetting her HEP at PT last visit  She still reports numbness and tingling into the fingers  Objective: See treatment diary below      Assessment: The patient demonstrated fair tolerance to exercises  The patient required repeated cues on form during strengthening exercises  She noted increased pain with shoulder ER; thus, repetitions were not progressed  Good response to laser  The patient reported less pain in the left shoulder post treatment  No change in right shoulder pain  Plan: Continue per plan of care  Precautions:  PMHx: depression, bipolar disorder, panic disorder, seizures, bladder CA s/p resection  Dx: B subacromial impingement, RTC tendonitis   Daily Treatment Diary     Manual  10/15 10/17           Laser: B RTC  4000J ea                                                                   Exercise Diary  10/15 10/17           Pulleys: FLEX, ABD  5"x10 ea           iso scap squeeze 5"x10 5"x20           B sh TB rows  R/ 2x10           R sh TB ER Y/  1x5 Y/ 1x10           L sh TB ER Y/  1x10 Y/ 1x10           Wall slides: FLEX B/  1x10 B/ 2x10 flex/abd                                                                                                                                                                                                     Modalities

## 2018-10-22 ENCOUNTER — OFFICE VISIT (OUTPATIENT)
Dept: PHYSICAL THERAPY | Facility: CLINIC | Age: 43
End: 2018-10-22
Payer: COMMERCIAL

## 2018-10-22 DIAGNOSIS — M75.81 ROTATOR CUFF TENDONITIS, RIGHT: ICD-10-CM

## 2018-10-22 DIAGNOSIS — M25.512 CHRONIC LEFT SHOULDER PAIN: ICD-10-CM

## 2018-10-22 DIAGNOSIS — G89.29 CHRONIC RIGHT SHOULDER PAIN: Primary | ICD-10-CM

## 2018-10-22 DIAGNOSIS — M75.82 ROTATOR CUFF TENDONITIS, LEFT: ICD-10-CM

## 2018-10-22 DIAGNOSIS — G89.29 CHRONIC LEFT SHOULDER PAIN: ICD-10-CM

## 2018-10-22 DIAGNOSIS — M25.511 CHRONIC RIGHT SHOULDER PAIN: Primary | ICD-10-CM

## 2018-10-22 PROCEDURE — 97110 THERAPEUTIC EXERCISES: CPT | Performed by: PHYSICAL THERAPIST

## 2018-10-22 PROCEDURE — 97112 NEUROMUSCULAR REEDUCATION: CPT | Performed by: PHYSICAL THERAPIST

## 2018-10-22 NOTE — PROGRESS NOTES
Daily Note     Today's date: 10/22/2018  Patient name: Laila Clements  : 1975  MRN: [id number]  Referring provider: Neeru Watson MD  Dx:   Encounter Diagnosis     ICD-10-CM    1  Chronic right shoulder pain M25 511     G89 29    2  Rotator cuff tendonitis, right M75 81    3  Rotator cuff tendonitis, left M75 82    4  Chronic left shoulder pain M25 512     G89 29                   Subjective: Pt reports a 60% improvement in pain levels since IE  Objective: See treatment diary below    Assessment: Pt required repeated and extensive cueing for correct TE performance and dosage; pt frequently refused to count repetitions  Plan: Cont  POC  Precautions:  PMHx: depression, bipolar disorder, panic disorder, seizures, bladder CA s/p resection  Dx: B subacromial impingement, RTC tendonitis   Daily Treatment Diary     Manual  10/15 10/17 10/22          Laser: B RTC  4000J ea 4000J ea                                                                  Exercise Diary  10/15 10/17 10/22          Pulleys: FLEX, ABD  5"x10 ea 5"x10 ea          iso scap squeeze 5"x10 5"x20 5"x15          B sh TB rows  R/ 2x10 R/  2x20          R sh TB ER Y/  1x5 Y/ 1x10 Y/  2x10          L sh TB ER Y/  1x10 Y/ 1x10 Y/  2x12          Wall slides: FLEX B/  1x10 B/ 2x10 flex/abd B/  1x15

## 2018-10-24 ENCOUNTER — OFFICE VISIT (OUTPATIENT)
Dept: PHYSICAL THERAPY | Facility: CLINIC | Age: 43
End: 2018-10-24
Payer: COMMERCIAL

## 2018-10-24 DIAGNOSIS — M75.82 ROTATOR CUFF TENDONITIS, LEFT: ICD-10-CM

## 2018-10-24 DIAGNOSIS — M25.511 CHRONIC RIGHT SHOULDER PAIN: Primary | ICD-10-CM

## 2018-10-24 DIAGNOSIS — G89.29 CHRONIC LEFT SHOULDER PAIN: ICD-10-CM

## 2018-10-24 DIAGNOSIS — G89.29 CHRONIC RIGHT SHOULDER PAIN: Primary | ICD-10-CM

## 2018-10-24 DIAGNOSIS — M25.512 CHRONIC LEFT SHOULDER PAIN: ICD-10-CM

## 2018-10-24 DIAGNOSIS — M75.81 ROTATOR CUFF TENDONITIS, RIGHT: ICD-10-CM

## 2018-10-24 PROCEDURE — 97110 THERAPEUTIC EXERCISES: CPT | Performed by: PHYSICAL THERAPIST

## 2018-10-24 PROCEDURE — 97112 NEUROMUSCULAR REEDUCATION: CPT | Performed by: PHYSICAL THERAPIST

## 2018-10-24 PROCEDURE — 97140 MANUAL THERAPY 1/> REGIONS: CPT | Performed by: PHYSICAL THERAPIST

## 2018-10-24 NOTE — PROGRESS NOTES
Daily Note     Today's date: 10/24/2018  Patient name: Karl Cagle  : 1975  MRN: [id number]  Referring provider: Radha Hamm MD  Dx:   Encounter Diagnosis     ICD-10-CM    1  Chronic right shoulder pain M25 511     G89 29    2  Rotator cuff tendonitis, right M75 81    3  Rotator cuff tendonitis, left M75 82    4  Chronic left shoulder pain M25 512     G89 29                   Subjective: Pt reports high pain levels R > L for 24 hrs following last treatment  She reports using a vacuum  later in the day following last treatment  Objective: See treatment diary below    Assessment: Pt requires repeated max cueing for all TE for correct performance and dosage  Pt's inability to follow instructions are likely contributing to inappropriate HEP dosage at home resulting in excessive soreness with pain-magnification  Plan: Assess response NV  Precautions:  PMHx: depression, bipolar disorder, panic disorder, seizures, bladder CA s/p resection  Dx: B subacromial impingement, RTC tendonitis   Daily Treatment Diary     Manual  10/15 10/17 10/22 10/24         Laser: B RTC  4000J ea 4000J ea 4000J ea                                                                 Exercise Diary  10/15 10/17 10/22 10/24         Pulleys: FLEX, ABD  5"x10 ea 5"x10 ea 5"x10         iso scap squeeze 5"x10 5"x20 5"x15 5"x15         B sh TB rows  R/ 2x10 R/  2x20 R/  2x15         R sh TB ER Y/  1x5 Y/ 1x10 Y/  2x10 Y/  1x6  1x8         L sh TB ER Y/  1x10 Y/ 1x10 Y/  2x12 Y/  2x12         Wall slides: FLEX B/  1x10 B/ 2x10 flex/abd B/  1x15 B/  1x15

## 2018-10-29 ENCOUNTER — APPOINTMENT (OUTPATIENT)
Dept: PHYSICAL THERAPY | Facility: CLINIC | Age: 43
End: 2018-10-29
Payer: COMMERCIAL

## 2018-10-31 ENCOUNTER — APPOINTMENT (OUTPATIENT)
Dept: PHYSICAL THERAPY | Facility: CLINIC | Age: 43
End: 2018-10-31
Payer: COMMERCIAL

## 2018-11-02 ENCOUNTER — APPOINTMENT (OUTPATIENT)
Dept: PHYSICAL THERAPY | Facility: CLINIC | Age: 43
End: 2018-11-02
Payer: COMMERCIAL

## 2018-11-05 ENCOUNTER — APPOINTMENT (OUTPATIENT)
Dept: PHYSICAL THERAPY | Facility: CLINIC | Age: 43
End: 2018-11-05
Payer: COMMERCIAL

## 2018-11-07 ENCOUNTER — APPOINTMENT (OUTPATIENT)
Dept: PHYSICAL THERAPY | Facility: CLINIC | Age: 43
End: 2018-11-07
Payer: COMMERCIAL

## 2018-11-13 ENCOUNTER — APPOINTMENT (OUTPATIENT)
Dept: PHYSICAL THERAPY | Facility: CLINIC | Age: 43
End: 2018-11-13
Payer: COMMERCIAL

## 2018-11-15 ENCOUNTER — OFFICE VISIT (OUTPATIENT)
Dept: PHYSICAL THERAPY | Facility: CLINIC | Age: 43
End: 2018-11-15
Payer: COMMERCIAL

## 2018-11-15 DIAGNOSIS — M25.511 CHRONIC RIGHT SHOULDER PAIN: Primary | ICD-10-CM

## 2018-11-15 DIAGNOSIS — M25.512 CHRONIC LEFT SHOULDER PAIN: ICD-10-CM

## 2018-11-15 DIAGNOSIS — G89.29 CHRONIC RIGHT SHOULDER PAIN: Primary | ICD-10-CM

## 2018-11-15 DIAGNOSIS — G89.29 CHRONIC LEFT SHOULDER PAIN: ICD-10-CM

## 2018-11-15 DIAGNOSIS — M75.82 ROTATOR CUFF TENDONITIS, LEFT: ICD-10-CM

## 2018-11-15 DIAGNOSIS — M75.81 ROTATOR CUFF TENDONITIS, RIGHT: ICD-10-CM

## 2018-11-15 PROCEDURE — 97140 MANUAL THERAPY 1/> REGIONS: CPT

## 2018-11-15 PROCEDURE — 97110 THERAPEUTIC EXERCISES: CPT

## 2018-11-15 PROCEDURE — 97112 NEUROMUSCULAR REEDUCATION: CPT

## 2018-11-15 NOTE — PROGRESS NOTES
Daily Note     Today's date: 11/15/2018  Patient name: Laila Clements  : 1975  MRN: [id number]  Referring provider: Neeru Watson MD  Dx:   Encounter Diagnosis     ICD-10-CM    1  Chronic right shoulder pain M25 511     G89 29    2  Rotator cuff tendonitis, right M75 81    3  Rotator cuff tendonitis, left M75 82    4  Chronic left shoulder pain M25 512     G89 29                   Subjective: Pt reports to PT after 3 weeks of absence 2* having the flu  Pt reports 3/10 right shoulder pain, 1/10 left shoulder pain pre tx  Pt reports fair compliance with HEP, overall decreased B shoulder pain with driving  Objective: See treatment diary below    Assessment: Pt required continued vcs from therapist to perform TE program  Pt demonstrated fatigue with R > L shoulder ER strengthening, fair ability to limit UT compensation with wall slides and TB exercises  Plan: Assess response NV  Precautions:  PMHx: depression, bipolar disorder, panic disorder, seizures, bladder CA s/p resection  Dx: B subacromial impingement, RTC tendonitis   Daily Treatment Diary     Manual  10/15 10/17 10/22 10/24 11/15        Laser: B RTC  4000J ea 4000J ea 4000J ea 4000J                                                                Exercise Diary  10/15 10/17 10/22 10/24 11/15        Pulleys: FLEX, ABD  5"x10 ea 5"x10 ea 5"x10 5"x10        iso scap squeeze 5"x10 5"x20 5"x15 5"x15 5"x15        B sh TB rows  R/ 2x10 R/  2x20 R/  2x15 R/  2x15        R sh TB ER Y/  1x5 Y/ 1x10 Y/  2x10 Y/  1x6  1x8 Y/  2x10        L sh TB ER Y/  1x10 Y/ 1x10 Y/  2x12 Y/  2x12 Y/  2x12        Wall slides: FLEX B/  1x10 B/ 2x10 flex/abd B/  1x15 B/  1x15 B/  1x15

## 2018-11-20 ENCOUNTER — OFFICE VISIT (OUTPATIENT)
Dept: PHYSICAL THERAPY | Facility: CLINIC | Age: 43
End: 2018-11-20
Payer: COMMERCIAL

## 2018-11-20 DIAGNOSIS — M25.512 CHRONIC LEFT SHOULDER PAIN: ICD-10-CM

## 2018-11-20 DIAGNOSIS — G89.29 CHRONIC RIGHT SHOULDER PAIN: Primary | ICD-10-CM

## 2018-11-20 DIAGNOSIS — G89.29 CHRONIC LEFT SHOULDER PAIN: ICD-10-CM

## 2018-11-20 DIAGNOSIS — M25.511 CHRONIC RIGHT SHOULDER PAIN: Primary | ICD-10-CM

## 2018-11-20 DIAGNOSIS — M75.81 ROTATOR CUFF TENDONITIS, RIGHT: ICD-10-CM

## 2018-11-20 DIAGNOSIS — M75.82 ROTATOR CUFF TENDONITIS, LEFT: ICD-10-CM

## 2018-11-20 PROCEDURE — 97140 MANUAL THERAPY 1/> REGIONS: CPT

## 2018-11-20 PROCEDURE — 97112 NEUROMUSCULAR REEDUCATION: CPT

## 2018-11-20 PROCEDURE — 97110 THERAPEUTIC EXERCISES: CPT

## 2018-11-20 NOTE — PROGRESS NOTES
Daily Note     Today's date: 2018  Patient name: Katherine Humphrey  : 1975  MRN: [id number]  Referring provider: Flori Deng MD  Dx:   Encounter Diagnosis     ICD-10-CM    1  Chronic right shoulder pain M25 511     G89 29    2  Rotator cuff tendonitis, right M75 81    3  Rotator cuff tendonitis, left M75 82    4  Chronic left shoulder pain M25 512     G89 29                   Subjective: Pt reports 7-8/10 right shoulder pain after cleaning her house and lifting an end table yesterday  Pt reports 0/10 L shoulder pain and numbness and tingling into her right 1st, 2nd and 3rd fingers pre tx  Objective: See treatment diary below; Added R median and radial nerve glides    Assessment: Pt demonstrated fair scapular stability, mild R UE radial and medial neural tension  Pt demonstrated continued difficulty with shoulder ER strengthening 2* fatigue  Plan: Continue poc as per PT  Precautions:  PMHx: depression, bipolar disorder, panic disorder, seizures, bladder CA s/p resection  Dx: B subacromial impingement, RTC tendonitis   Daily Treatment Diary     Manual  10/15 10/17 10/22 10/24 11/15 11/20       Laser: B RTC  4000J ea 4000J ea 4000J ea 4000J 4000J       Median, radial nerve glides at elbow and wrist      1x8 ea                                                  Exercise Diary  10/15 10/17 10/22 10/24 11/15 11/20       Pulleys: FLEX, ABD  5"x10 ea 5"x10 ea 5"x10 5"x10 5"x10        iso scap squeeze 5"x10 5"x20 5"x15 5"x15 5"x15 5"x15       B sh TB rows  R/ 2x10 R/  2x20 R/  2x15 R/  2x15 R/  2x15       R sh TB ER Y/  1x5 Y/ 1x10 Y/  2x10 Y/  1x6  1x8 Y/  2x10 Y/  2x10       L sh TB ER Y/  1x10 Y/ 1x10 Y/  2x12 Y/  2x12 Y/  2x12 Y/  2x12       Wall slides: FLEX B/  1x10 B/ 2x10 flex/abd B/  1x15 B/  1x15 B/  1x15 B/  1x15

## 2018-11-21 ENCOUNTER — OFFICE VISIT (OUTPATIENT)
Dept: PHYSICAL THERAPY | Facility: CLINIC | Age: 43
End: 2018-11-21
Payer: COMMERCIAL

## 2018-11-21 DIAGNOSIS — M25.511 CHRONIC RIGHT SHOULDER PAIN: Primary | ICD-10-CM

## 2018-11-21 DIAGNOSIS — M75.82 ROTATOR CUFF TENDONITIS, LEFT: ICD-10-CM

## 2018-11-21 DIAGNOSIS — M25.512 CHRONIC LEFT SHOULDER PAIN: ICD-10-CM

## 2018-11-21 DIAGNOSIS — G89.29 CHRONIC RIGHT SHOULDER PAIN: Primary | ICD-10-CM

## 2018-11-21 DIAGNOSIS — G89.29 CHRONIC LEFT SHOULDER PAIN: ICD-10-CM

## 2018-11-21 DIAGNOSIS — M75.81 ROTATOR CUFF TENDONITIS, RIGHT: ICD-10-CM

## 2018-11-21 PROCEDURE — G8990 OTHER PT/OT CURRENT STATUS: HCPCS | Performed by: PHYSICAL THERAPIST

## 2018-11-21 PROCEDURE — 97110 THERAPEUTIC EXERCISES: CPT | Performed by: PHYSICAL THERAPIST

## 2018-11-21 PROCEDURE — 97112 NEUROMUSCULAR REEDUCATION: CPT | Performed by: PHYSICAL THERAPIST

## 2018-11-21 PROCEDURE — G8991 OTHER PT/OT GOAL STATUS: HCPCS | Performed by: PHYSICAL THERAPIST

## 2018-11-21 NOTE — PROGRESS NOTES
Daily Note     Today's date: 2018  Patient name: Kb Frederick  : 1975  MRN: [id number]  Referring provider: Halie Bolanos MD  Dx:   Encounter Diagnosis     ICD-10-CM    1  Chronic right shoulder pain M25 511     G89 29    2  Chronic left shoulder pain M25 512     G89 29    3  Rotator cuff tendonitis, right M75 81    4  Rotator cuff tendonitis, left M75 82                   Subjective: Pt reports significantly improved B shoulder and R > L hand paraesthesias  Objective: See treatment diary below    Assessment: Pt continues to require max cueing for correct TE performance and dosage  Pt demonstrated improved B UE neural tension but continued pain-magnification behavior  Plan: Cont  POC  Precautions:  PMHx: depression, bipolar disorder, panic disorder, seizures, bladder CA s/p resection  Dx: B subacromial impingement, RTC tendonitis   Daily Treatment Diary     Manual  10/15 10/17 10/22 10/24 11/15 11/20 11/21      Laser: B RTC  4000J ea 4000J ea 4000J ea 4000J 4000J 4000J ea      Median, radial nerve glides at elbow and wrist      1x8 ea R radial/  1x10  L median/  1x10                                                 Exercise Diary  10/15 10/17 10/22 10/24 11/15 11/20 11/21      Pulleys: FLEX, ABD  5"x10 ea 5"x10 ea 5"x10 5"x10 5"x10  B/  5"x10 ea      iso scap squeeze 5"x10 5"x20 5"x15 5"x15 5"x15 5"x15 np      B sh TB rows  R/ 2x10 R/  2x20 R/  2x15 R/  2x15 R/  2x15 R/  3x15      R sh TB ER Y/  1x5 Y/ 1x10 Y/  2x10 Y/  1x6  1x8 Y/  2x10 Y/  2x10 Y/  2x10      L sh TB ER Y/  1x10 Y/ 1x10 Y/  2x12 Y/  2x12 Y/  2x12 Y/  2x12 Y/  2x12      Wall slides: FLEX B/  1x10 B/ 2x10 flex/abd B/  1x15 B/  1x15 B/  1x15 B/  1x15 R/  2x12  L/  2x10      B sh TB ER       YTB/  2x10

## 2018-11-26 ENCOUNTER — TELEPHONE (OUTPATIENT)
Dept: OBGYN CLINIC | Facility: HOSPITAL | Age: 43
End: 2018-11-26

## 2018-11-26 ENCOUNTER — OFFICE VISIT (OUTPATIENT)
Dept: PHYSICAL THERAPY | Facility: CLINIC | Age: 43
End: 2018-11-26
Payer: COMMERCIAL

## 2018-11-26 DIAGNOSIS — M75.81 ROTATOR CUFF TENDONITIS, RIGHT: ICD-10-CM

## 2018-11-26 DIAGNOSIS — M25.511 CHRONIC RIGHT SHOULDER PAIN: Primary | ICD-10-CM

## 2018-11-26 DIAGNOSIS — G89.29 CHRONIC LEFT SHOULDER PAIN: ICD-10-CM

## 2018-11-26 DIAGNOSIS — M75.82 ROTATOR CUFF TENDONITIS, LEFT: ICD-10-CM

## 2018-11-26 DIAGNOSIS — G89.29 CHRONIC RIGHT SHOULDER PAIN: Primary | ICD-10-CM

## 2018-11-26 DIAGNOSIS — M25.512 CHRONIC LEFT SHOULDER PAIN: ICD-10-CM

## 2018-11-26 PROCEDURE — 97110 THERAPEUTIC EXERCISES: CPT

## 2018-11-26 NOTE — PROGRESS NOTES
Daily Note     Today's date: 2018  Patient name: Parish Moura  : 1975  MRN: [id number]  Referring provider: Lynsey Toscano MD  Dx:   Encounter Diagnosis     ICD-10-CM    1  Chronic right shoulder pain M25 511     G89 29    2  Chronic left shoulder pain M25 512     G89 29    3  Rotator cuff tendonitis, right M75 81    4  Rotator cuff tendonitis, left M75 82                   Subjective: Pt complains of B shoulder pain today, R > L with numbness and tingling radiating down her R UE  "it's bad today, I don't know why it's so bad, I don't know what to do about it "       Objective: See treatment diary below    Precautions:  PMHx: depression, bipolar disorder, panic disorder, seizures, bladder CA s/p resection  Dx: B subacromial impingement, RTC tendonitis              Daily Treatment Diary      Manual  10/15 10/17 10/22 10/24 11/15 11/20 11/21  11/26       Laser: B RTC   4000J ea 4000J ea 4000J ea 4000J 4000J 4000J ea  4000J ea       Median, radial nerve glides at elbow and wrist           1x8 ea R radial/  1x10  L median/  1x10  np                                                                                     Exercise Diary  10/15 10/17 10/22 10/24 11/15 11/20 11/21  11/26       Pulleys: FLEX, ABD   5"x10 ea 5"x10 ea 5"x10 5"x10 5"x10  B/  5"x10 ea  np       iso scap squeeze 5"x10 5"x20 5"x15 5"x15 5"x15 5"x15 np  np       B sh TB rows   R/ 2x10 R/  2x20 R/  2x15 R/  2x15 R/  2x15 R/  3x15  np       R sh TB ER Y/  1x5 Y/ 1x10 Y/  2x10 Y/  1x6  1x8 Y/  2x10 Y/  2x10 Y/  2x10  np       L sh TB ER Y/  1x10 Y/ 1x10 Y/  2x12 Y/  2x12 Y/  2x12 Y/  2x12 Y/  2x12  np       Wall slides: FLEX B/  1x10 B/ 2x10 flex/abd B/  1x15 B/  1x15 B/  1x15 B/  1x15 R/  2x12  L/  2x10  B 2 x 10       B sh TB ER             YTB/  2x10  np                                                                                                                                                                                                                                                                                                                                        Assessment: Tolerated treatment poor  Pt declined to perform remainder of TE program due to increasing levels of B shoulder pain and radiating symptoms down R UE  Advised pt to remain within a pain-free zone with wall slides and contact her doctor if pain persists  Resume full program nv if able  Patient would benefit from continued PT For improved strength, flexibility and overall function  Plan: Continue per plan of care

## 2018-11-26 NOTE — TELEPHONE ENCOUNTER
Patient called to schedule an earlier appt with Dr Kylah Josue at [address] in [place]  I did call and left a voicemail at 11/26 @ 3:00PM to have her her call so we can schedule an earlier date  Patient states she is in bad pain for her shoulders and would like a sooner appt   Best contact is [phone]

## 2018-11-27 ENCOUNTER — HOSPITAL ENCOUNTER (OUTPATIENT)
Dept: RADIOLOGY | Facility: HOSPITAL | Age: 43
Discharge: HOME | End: 2018-11-27
Attending: ORTHOPAEDIC SURGERY
Payer: COMMERCIAL

## 2018-11-27 ENCOUNTER — OFFICE VISIT (OUTPATIENT)
Dept: OBGYN CLINIC | Facility: HOSPITAL | Age: 43
End: 2018-11-27
Payer: COMMERCIAL

## 2018-11-27 VITALS
WEIGHT: 235.8 LBS | BODY MASS INDEX: 37.9 KG/M2 | HEIGHT: 66 IN | DIASTOLIC BLOOD PRESSURE: 85 MMHG | HEART RATE: 105 BPM | SYSTOLIC BLOOD PRESSURE: 138 MMHG

## 2018-11-27 DIAGNOSIS — M54.12 RADICULOPATHY, CERVICAL REGION: ICD-10-CM

## 2018-11-27 DIAGNOSIS — M54.12 RADICULOPATHY, CERVICAL REGION: Primary | ICD-10-CM

## 2018-11-27 PROCEDURE — 72040 X-RAY EXAM NECK SPINE 2-3 VW: CPT

## 2018-11-27 PROCEDURE — 99213 OFFICE O/P EST LOW 20 MIN: CPT | Performed by: ORTHOPAEDIC SURGERY

## 2018-11-27 RX ORDER — ALBUTEROL SULFATE 90 UG/1
1 AEROSOL, METERED RESPIRATORY (INHALATION) EVERY 4 HOURS PRN
COMMUNITY
Start: 2017-12-08 | End: 2019-03-15 | Stop reason: SDUPTHER

## 2018-11-27 RX ORDER — ONDANSETRON 4 MG/1
TABLET, FILM COATED ORAL
Refills: 0 | COMMUNITY
Start: 2018-11-05

## 2018-11-27 RX ORDER — METHYLPREDNISOLONE 4 MG/1
TABLET ORAL
Qty: 21 TABLET | Refills: 0 | Status: SHIPPED | OUTPATIENT
Start: 2018-11-27 | End: 2019-06-03 | Stop reason: ALTCHOICE

## 2018-11-27 RX ORDER — PHENAZOPYRIDINE HYDROCHLORIDE 100 MG/1
100 TABLET, FILM COATED ORAL 3 TIMES DAILY PRN
COMMUNITY
End: 2021-03-25 | Stop reason: HOSPADM

## 2018-11-27 RX ORDER — OSELTAMIVIR PHOSPHATE 75 MG/1
CAPSULE ORAL
Refills: 0 | COMMUNITY
Start: 2018-10-31 | End: 2019-02-19 | Stop reason: ALTCHOICE

## 2018-11-27 RX ORDER — OXYBUTYNIN CHLORIDE 15 MG/1
TABLET, EXTENDED RELEASE ORAL
COMMUNITY
Start: 2018-11-21 | End: 2021-03-25 | Stop reason: HOSPADM

## 2018-11-27 RX ORDER — AMOXICILLIN 875 MG/1
TABLET, COATED ORAL
Refills: 0 | COMMUNITY
Start: 2018-11-10 | End: 2019-06-03 | Stop reason: ALTCHOICE

## 2018-11-27 RX ORDER — CEFUROXIME AXETIL 250 MG/1
TABLET ORAL
Refills: 0 | COMMUNITY
Start: 2018-10-31 | End: 2019-03-15 | Stop reason: ALTCHOICE

## 2018-11-27 RX ORDER — PRAVASTATIN SODIUM 40 MG
40 TABLET ORAL
COMMUNITY
Start: 2018-10-19

## 2018-11-27 NOTE — PROGRESS NOTES
37 y o female presents for continuing care of bilateral upper extremity dysfunction  She now has pain that radiates from her neck down her bilateral upper extremities into her fingertips  She has paresthesias in the RUE particularly to the middle finger  She has begun to drop objects  Review of Systems  Review of systems negative unless otherwise specified in HPI    Past Medical History  Past Medical History:   Diagnosis Date    Asthma     Bladder CA in situ     Cystitis, chronic     Diabetes mellitus ([address] )     High cholesterol     Hypertension     Psychiatric disorder     Bipolar    Psychiatric disorder     Panic disorder       Past Surgical History  Past Surgical History:   Procedure Laterality Date    BLADDER SURGERY N/A     CHOLECYSTECTOMY      URETHRA SURGERY      1980    WISDOM TOOTH EXTRACTION         Current Medications  Current Outpatient Prescriptions on File Prior to Visit   Medication Sig Dispense Refill    acetaminophen (TYLENOL) 500 mg tablet Take 1,000 mg by mouth every 6 (six) hours as needed for mild pain      atenolol (TENORMIN) 25 mg tablet 1 daily      azelastine (ASTELIN) 0 1 % nasal spray 2 sprays into each nostril 2 (two) times a day Use in each nostril as directed      clonazePAM (KlonoPIN) 0 5 mg tablet Take 0 5 mg by mouth 3 (three) times a day      clonazePAM (KlonoPIN) 1 mg tablet TAKE 1/2 TABLET 3 TIMES A DAY  1    clotrimazole (CLOTRIMAZOLE ANTI-FUNGAL) 1 % cream Apply topically as needed      ezetimibe (ZETIA) 10 mg tablet Take 10 mg by mouth daily  3    fluticasone (FLONASE) 50 mcg/act nasal spray 2 sprays into each nostril 2 (two) times a day      gabapentin (NEURONTIN) 100 mg capsule       glucose blood (FREESTYLE INSULINX TEST) test strip Freestyle Lite test strips  Test 8XD   ibuprofen (MOTRIN) 200 mg tablet Take 600 mg by mouth every 8 (eight) hours as needed for mild pain      lamoTRIgine (LaMICtal) 200 MG tablet TAKE 1 TABLET TWICE DAILY   [address] [address] tablet 1    lamoTRIgine (LaMICtal) 25 mg tablet TAKE 1 (25MG) TABLET BY MOUTH TWICE A DAY IN ADDITION TO 200MG TWICE DAILY 180 tablet 3    Lancets 28G MISC Freestyle Lancets  Test 8XD   meloxicam (MOBIC) 15 mg tablet       metFORMIN (GLUCOPHAGE-XR) 500 mg 24 hr tablet Take 1,000 mg by mouth 2 (two) times a day        Multiple Vitamins-Minerals (MULTIVITAMIN GUMMIES ADULT PO) Take 1 capsule by mouth      neomycin-polymyxin-hydrocortisone (CORTISPORIN) 1 % SOLN ADMINISTER 3 DROPS INTO BOTH EARS 4 (FOUR) TIMES A DAY FOR 10 DAYS  1    nitrofurantoin (MACROBID) 100 mg capsule Take 100 mg by mouth as needed      omeprazole (PriLOSEC) 20 mg delayed release capsule Take 20 mg by mouth every morning      repaglinide (PRANDIN) 2 mg tablet Take 2 mg by mouth 3 (three) times a day Take as directed       sitaGLIPtin (JANUVIA) 100 mg tablet Take 100 mg by mouth daily      venlafaxine (EFFEXOR-XR) 75 mg 24 hr capsule Take 75 mg by mouth every evening      [DISCONTINUED] albuterol (PROVENTIL HFA,VENTOLIN HFA) 90 mcg/act inhaler Inhale 2 puffs every 6 (six) hours as needed for shortness of breath       No current facility-administered medications on file prior to visit  Recent Labs [facility])    0  Lab Value Date/Time   HCT 44 4 03/08/2015 1450   HGB 15 1 03/08/2015 1450   WBC 10 45 (H) 03/08/2015 1450   GLUCOSE 134 03/08/2015 1450   HGBA1C 8 3 02/21/2018   HGBA1C 7 4 (H) 01/30/2015 0725         Physical exam  General: Awake, Alert, Oriented  HEENT: No scleral injection, no evidence of facial trauma  Heart: Extremities warm and well perfused  Lungs: No audible wheezing  ·   Cervical spine/bl UE  · Weakness RUE with elbow extension, wrist flexion, otherwise 5/5 b/l UE extremity strength  · Spurling's test produces pain to mid trapezial area   Lhermitte's negative  · Equivocal Flynn's test    Procedure  None    Imaging  Plain films of C spine reveal minimal degenerative changes     A/P: 43F with bilateral cervical radiculopathy, RUE worse than LUE, concerning for C7 radiculopathy  WBAT bilateral UE  Oral analgesics for pain  Will order MRI cervical without contrast evaluate for DDD and suspected disc herniation affecting nerve roots  Referral to [facility] and pain center for bilateral cervical radiculopathy  Follow-up PRN with Dr Rivera Mon  11/27/18

## 2018-11-28 ENCOUNTER — APPOINTMENT (OUTPATIENT)
Dept: PHYSICAL THERAPY | Facility: CLINIC | Age: 43
End: 2018-11-28
Payer: COMMERCIAL

## 2018-11-29 ENCOUNTER — TELEPHONE (OUTPATIENT)
Dept: OBGYN CLINIC | Facility: HOSPITAL | Age: 43
End: 2018-11-29

## 2018-11-29 NOTE — TELEPHONE ENCOUNTER
Harmon December from [facility] called stating that the patient needs prior auth for her MRI

## 2018-11-30 ENCOUNTER — TELEPHONE (OUTPATIENT)
Dept: OBGYN CLINIC | Facility: CLINIC | Age: 43
End: 2018-11-30

## 2018-11-30 NOTE — TELEPHONE ENCOUNTER
Dr Elizabet Paz called to request a note excusing her from work until revaluated by you after her MRI and back doctor  She has rotator tendinitis and although taking prednisone is experiencing shooting pain down both arms  She works for a [employer] where lifting is required and makes it very painful to work  She has discussed this with her employer and they are expecting the request   Please fax to [phone] att  Иван Pinon  Any questions please call Delbert Castellanos @ [phone]    Thank you

## 2018-12-07 ENCOUNTER — APPOINTMENT (OUTPATIENT)
Dept: RADIOLOGY | Age: 43
End: 2018-12-07
Payer: COMMERCIAL

## 2018-12-07 ENCOUNTER — OFFICE VISIT (OUTPATIENT)
Dept: URGENT CARE | Age: 43
End: 2018-12-07
Payer: COMMERCIAL

## 2018-12-07 VITALS
RESPIRATION RATE: 18 BRPM | TEMPERATURE: 97.9 F | SYSTOLIC BLOOD PRESSURE: 132 MMHG | HEIGHT: 65 IN | OXYGEN SATURATION: 96 % | WEIGHT: 234 LBS | BODY MASS INDEX: 38.99 KG/M2 | HEART RATE: 85 BPM | DIASTOLIC BLOOD PRESSURE: 72 MMHG

## 2018-12-07 DIAGNOSIS — J20.8 ACUTE BACTERIAL BRONCHITIS: ICD-10-CM

## 2018-12-07 DIAGNOSIS — B96.89 ACUTE BACTERIAL BRONCHITIS: ICD-10-CM

## 2018-12-07 DIAGNOSIS — R05.9 COUGH: Primary | ICD-10-CM

## 2018-12-07 DIAGNOSIS — R05.9 COUGH: ICD-10-CM

## 2018-12-07 PROCEDURE — 71046 X-RAY EXAM CHEST 2 VIEWS: CPT

## 2018-12-07 PROCEDURE — 99213 OFFICE O/P EST LOW 20 MIN: CPT | Performed by: FAMILY MEDICINE

## 2018-12-07 RX ORDER — DOXYCYCLINE HYCLATE 100 MG/1
100 CAPSULE ORAL EVERY 12 HOURS SCHEDULED
Qty: 20 CAPSULE | Refills: 0 | Status: SHIPPED | OUTPATIENT
Start: 2018-12-07 | End: 2018-12-17

## 2018-12-07 NOTE — PROGRESS NOTES
[address] [facility]        NAME: Tesha Mathew is a 37 y o  female  : 1975    MRN: [id number]  DATE: 2018  TIME: 3:39 PM    Assessment and Plan   Cough [R05]  1  Cough  XR chest pa & lateral   2  Acute bacterial bronchitis  doxycycline hyclate (VIBRAMYCIN) 100 mg capsule         Patient Instructions     Patient Instructions     No acute pulmonary disease on chest xray  Rest and drink extra fluids  Start antibiotic  Take probiotic  Cool mist humidification can be helpful  Follow up with PCP if no improvement  Go to ER with worsening symptoms  Chief Complaint     Chief Complaint   Patient presents with    Cold Like Symptoms     x 1 week, getting worse, exposure to granmother with RSV         History of Present Illness   Tesha Mathew presents to the clinic c/o    This is a 37year old female here today with cough and congestion  She states in October she was diagnosed with influenza B  She was then treated with acute bronchitis with ceftin  She states she return to express care and was prescribed another antibiotic but she did not take this  She states cough has never full resolved  She states she continues with cough and congestion  She states her mucous is yellow  She has some wheezing at times  Cough is worse at night  She has been using Tylenol  She had Tmax yesterday of 100 7  She has had some nausea  She has vomited with cough  She is also concerned as grandmother was diagnosed with RSV  Cough has worsened over the last week  Review of Systems   Review of Systems   Constitutional: Positive for activity change, chills and fatigue  HENT: Positive for congestion  Negative for ear pain, sinus pain, sinus pressure and sore throat  Respiratory: Positive for cough and wheezing  Negative for chest tightness  Genitourinary: Negative  Skin: Negative  Neurological: Negative  Psychiatric/Behavioral: Negative            Current Medications Long-Term Prescriptions   Medication Sig Dispense Refill    atenolol (TENORMIN) 25 mg tablet 1 daily      azelastine (ASTELIN) 0 1 % nasal spray 2 sprays into each nostril 2 (two) times a day Use in each nostril as directed      clonazePAM (KlonoPIN) 0 5 mg tablet Take 0 5 mg by mouth 3 (three) times a day      clonazePAM (KlonoPIN) 1 mg tablet TAKE 1/2 TABLET 3 TIMES A DAY  1    clotrimazole (CLOTRIMAZOLE ANTI-FUNGAL) 1 % cream Apply topically as needed      ezetimibe (ZETIA) 10 mg tablet Take 10 mg by mouth daily  3    fluticasone (FLONASE) 50 mcg/act nasal spray 2 sprays into each nostril 2 (two) times a day      gabapentin (NEURONTIN) 100 mg capsule       ibuprofen (MOTRIN) 200 mg tablet Take 600 mg by mouth every 8 (eight) hours as needed for mild pain      lamoTRIgine (LaMICtal) 200 MG tablet TAKE 1 TABLET TWICE DAILY  180 tablet 1    lamoTRIgine (LaMICtal) 25 mg tablet TAKE 1 (25MG) TABLET BY MOUTH TWICE A DAY IN ADDITION TO 200MG TWICE DAILY 180 tablet 3    Lancets 28G MISC Freestyle Lancets  Test 8XD   meloxicam (MOBIC) 15 mg tablet       metFORMIN (GLUCOPHAGE-XR) 500 mg 24 hr tablet Take 1,000 mg by mouth 2 (two) times a day        neomycin-polymyxin-hydrocortisone (CORTISPORIN) 1 % SOLN ADMINISTER 3 DROPS INTO BOTH EARS 4 (FOUR) TIMES A DAY FOR 10 DAYS    1    omeprazole (PriLOSEC) 20 mg delayed release capsule Take 20 mg by mouth every morning      ondansetron (ZOFRAN) 4 mg tablet TAKE 1 TABLET BY MOUTH EVERY 8 HOURS AS NEEDED FOR NAUSEA AND VOMITING  0    oxybutynin (DITROPAN XL) 15 MG 24 hr tablet       pravastatin (PRAVACHOL) 40 mg tablet Take 40 mg by mouth      repaglinide (PRANDIN) 2 mg tablet Take 2 mg by mouth 3 (three) times a day Take as directed       sitaGLIPtin (JANUVIA) 100 mg tablet Take 100 mg by mouth daily      venlafaxine (EFFEXOR-XR) 75 mg 24 hr capsule Take 75 mg by mouth every evening         Current Allergies     Allergies as of 12/07/2018 - Reviewed 12/07/2018   Allergen Reaction Noted    Lithium Other (See Comments) 06/17/2015    Depakote [valproic acid] Other (See Comments) 02/11/2016    Divalproex sodium  09/29/2017    Influenza vaccines Swelling 06/17/2015    Loratadine  07/12/2012    Other  05/29/2015    Propoxyphene  07/12/2012    Rosuvastatin Myalgia 04/09/2018    Singulair [montelukast] Hives 10/16/2012    Topamax [topiramate] Other (See Comments) 08/17/2017    Atorvastatin Rash 09/13/2016    Simvastatin Rash 09/13/2016            The following portions of the patient's history were reviewed and updated as appropriate: allergies, current medications, past family history, past medical history, past social history, past surgical history and problem list     Objective   /72   Pulse 85   Temp 97 9 °F (36 6 °C) (Temporal)   Resp 18   Ht 5' 5" (1 651 m)   Wt 106 kg (234 lb)   LMP 11/26/2018   SpO2 96%   BMI 38 94 kg/m²        Physical Exam     Physical Exam   Constitutional: She is oriented to person, place, and time  She appears well-developed and well-nourished  HENT:   Head: Normocephalic  Right Ear: External ear normal    Left Ear: External ear normal    Posterior pharynx mildly erythemic    Neck: Normal range of motion  Neck supple  Cardiovascular: Normal rate, regular rhythm and normal heart sounds  Pulmonary/Chest: Breath sounds normal  No respiratory distress  She has no wheezes  She has no rales  She exhibits no tenderness  Neurological: She is alert and oriented to person, place, and time  Psychiatric: She has a normal mood and affect  Her behavior is normal    Nursing note and vitals reviewed  Chest xray:  No acute pulmonary disease

## 2018-12-07 NOTE — PATIENT INSTRUCTIONS
No acute pulmonary disease on chest xray  Rest and drink extra fluids  Start antibiotic  Take probiotic  Cool mist humidification can be helpful  Follow up with PCP if no improvement  Go to ER with worsening symptoms  Acute Bronchitis   AMBULATORY CARE:   Acute bronchitis  is swelling and irritation in the air passages of your lungs  This irritation may cause you to cough or have other breathing problems  Acute bronchitis often starts because of another illness, such as a cold or the flu  The illness spreads from your nose and throat to your windpipe and airways  Bronchitis is often called a chest cold  Acute bronchitis lasts about 3 to 6 weeks and is usually not a serious illness  Your cough can last for several weeks  You may have any of the following symptoms:   · A cough with sputum that may be clear, yellow, or green    · Feeling more tired than usual, and body aches    · A fever and chills    · Wheezing when you breathe    · A tight chest or pain when you breathe or cough  Seek care immediately if:   · You cough up blood  · Your lips or fingernails turn blue  · You feel like you are not getting enough air when you breathe  Contact your healthcare provider if:   · You have a fever  · Your breathing problems do not go away or get worse  · Your cough does not get better within 4 weeks  · You have questions or concerns about your condition or care  Self-care:   · Get more rest   Rest helps your body to heal  Slowly start to do more each day  Rest when you feel it is needed  · Avoid irritants in the air  Avoid chemicals, fumes, and dust  Wear a face mask if you must work around dust or fumes  Stay inside on days when air pollution levels are high  If you have allergies, stay inside when pollen counts are high  Do not use aerosol products, such as spray-on deodorant, bug spray, and hair spray  · Do not smoke or be around others who smoke    Nicotine and other chemicals in cigarettes and cigars damages the cilia that move mucus out of your lungs  Ask your healthcare provider for information if you currently smoke and need help to quit  E-cigarettes or smokeless tobacco still contain nicotine  Talk to your healthcare provider before you use these products  · Drink liquids as directed  Liquids help keep your air passages moist and help you cough up mucus  You may need to drink more liquids when you have acute bronchitis  Ask how much liquid to drink each day and which liquids are best for you  · Use a humidifier or vaporizer  Use a cool mist humidifier or a vaporizer to increase air moisture in your home  This may make it easier for you to breathe and help decrease your cough  Prevent acute bronchitis by doing the following:   · Get the vaccinations you need  Ask your healthcare provider if you should get vaccinated against the flu or pneumonia  · Prevent the spread of germs  You can decrease your risk of acute bronchitis and other illnesses by doing the following:     [facility] AUTHORITY your hands often with soap and water  Carry germ-killing hand lotion or gel with you  You can use the lotion or gel to clean your hands when soap and water are not available  ¨ Do not touch your eyes, nose, or mouth unless you have washed your hands first     ¨ Always cover your mouth when you cough to prevent the spread of germs  It is best to cough into a tissue or your shirt sleeve instead of into your hand  Ask those around you cover their mouths when they cough  ¨ Try to avoid people who have a cold or the flu  If you are sick, stay away from others as much as possible  Medicines: Your healthcare provider may  give you any of the following:  · Ibuprofen or acetaminophen  are medicines that help lower your fever  They are available without a doctor's order  Ask your healthcare provider which medicine is right for you  Ask how much to take and how often to take it  Follow directions   These medicines can cause stomach bleeding if not taken correctly  Ibuprofen can cause kidney damage  Do not take ibuprofen if you have kidney disease, an ulcer, or allergies to aspirin  Acetaminophen can cause liver damage  Do not take more than 4,000 milligrams in 24 hours  · Decongestants  help loosen mucus in your lungs and make it easier to cough up  This can help you breathe easier  · Cough suppressants  decrease your urge to cough  If your cough produces mucus, do not take a cough suppressant unless your healthcare provider tells you to  Your healthcare provider may suggest that you take a cough suppressant at night so you can rest     · Inhalers  may be given  Your healthcare provider may give you one or more inhalers to help you breathe easier and cough less  An inhaler gives your medicine to open your airways  Ask your healthcare provider to show you how to use your inhaler correctly  Follow up with your healthcare provider as directed:  Write down questions you have so you will remember to ask them during your follow-up visits  © 2017 [facility] Information is for End User's use only and may not be sold, redistributed or otherwise used for commercial purposes  All illustrations and images included in CareNotes® are the copyrighted property of A D A M [employer] Inc  or Jacob Urbano  The above information is an  only  It is not intended as medical advice for individual conditions or treatments  Talk to your doctor, nurse or pharmacist before following any medical regimen to see if it is safe and effective for you

## 2018-12-14 ENCOUNTER — TELEPHONE (OUTPATIENT)
Dept: PAIN MEDICINE | Facility: CLINIC | Age: 43
End: 2018-12-14

## 2018-12-14 NOTE — TELEPHONE ENCOUNTER
Patient called in stated she didn't receive any new patient paperwork and she was never told about it  Patient started yelling very upset that paperwork wasn't mailed  She stated it's not her fault [facility] not doing their job  I tried giving patient the web site to print forms or to come in early she hung up before acknowledging

## 2019-02-06 RX ORDER — IBUPROFEN 800 MG/1
800 TABLET ORAL 3 TIMES DAILY
Refills: 0 | COMMUNITY
Start: 2019-01-30

## 2019-02-06 RX ORDER — METHOCARBAMOL 750 MG/1
TABLET, FILM COATED ORAL
Refills: 0 | COMMUNITY
Start: 2019-01-30 | End: 2019-06-03 | Stop reason: DRUGHIGH

## 2019-02-08 ENCOUNTER — PROCEDURE VISIT (OUTPATIENT)
Dept: UROLOGY | Facility: MEDICAL CENTER | Age: 44
End: 2019-02-08
Payer: COMMERCIAL

## 2019-02-08 VITALS
HEART RATE: 102 BPM | WEIGHT: 234 LBS | SYSTOLIC BLOOD PRESSURE: 138 MMHG | HEIGHT: 65 IN | BODY MASS INDEX: 38.99 KG/M2 | DIASTOLIC BLOOD PRESSURE: 80 MMHG

## 2019-02-08 DIAGNOSIS — C67.9 MALIGNANT NEOPLASM OF URINARY BLADDER, UNSPECIFIED SITE (HCC): Primary | ICD-10-CM

## 2019-02-08 LAB
SL AMB  POCT GLUCOSE, UA: ABNORMAL
SL AMB LEUKOCYTE ESTERASE,UA: ABNORMAL
SL AMB POCT BILIRUBIN,UA: ABNORMAL
SL AMB POCT BLOOD,UA: ABNORMAL
SL AMB POCT CLARITY,UA: CLEAR
SL AMB POCT COLOR,UA: YELLOW
SL AMB POCT KETONES,UA: ABNORMAL
SL AMB POCT NITRITE,UA: ABNORMAL
SL AMB POCT PH,UA: 5.5
SL AMB POCT SPECIFIC GRAVITY,UA: 1.02
SL AMB POCT URINE PROTEIN: ABNORMAL
SL AMB POCT UROBILINOGEN: 0.2

## 2019-02-08 PROCEDURE — 81003 URINALYSIS AUTO W/O SCOPE: CPT | Performed by: UROLOGY

## 2019-02-08 PROCEDURE — 52000 CYSTOURETHROSCOPY: CPT | Performed by: UROLOGY

## 2019-02-08 PROCEDURE — 99214 OFFICE O/P EST MOD 30 MIN: CPT | Performed by: UROLOGY

## 2019-02-08 PROCEDURE — 87086 URINE CULTURE/COLONY COUNT: CPT | Performed by: UROLOGY

## 2019-02-08 NOTE — PROGRESS NOTES
[address] [facility]  [facility]  [address] [facility]  [place], [address] [place] [address]  [phone]  www  [facility]  org      NAME: Ale Larson  AGE: 37 y o  SEX: female  : 1975   MRN: [id number]    DATE: 2019  TIME: 8:50 AM    Assessment and Plan:    Bladder cancer with tiny papillary recurrence at the left ureteral orifice  Plan cystoscopy, trans urethral resection of bladder tumor with mitomycin installation  I will use cup biopsy forceps to resect it due to its small size  The risks of bleeding infection and damage to the urinary tract have been explained she gives informed consent  She has had this done before  Chief Complaint   No chief complaint on file  History of Present Illness   Bladder cancer:  Here for surveillance cystoscopy  She is not BCG candidate due to episodes of sepsis recurrent UTI  She underwent TURBT 2017 for a PTA lesion, muscles presents in the specimen and uninvolved  The tumor was high-grade  She had mitomycin instillation at that time  This was her first recurrence  Cystoscopy  Date/Time: 2019 8:51 AM  Performed by: Omar Benson  Authorized by: Omar Benson     Procedure details: cystoscopy    Additional Procedure Details: Cystoscopy Procedure Note        Pre-operative Diagnosis:  Bladder cancer, for surveillance cystoscopy    Post-operative Diagnosis:  Same, with recurrence at the left ureteral orifice    Procedure: Flexible cystoscopy    Surgeon: Angel Darnell MD    Anesthesia: 1% Xylocaine per urethra    EBL: Minimal    Complications: none    Procedure Details   The risks, benefits, complications, treatment options, and expected outcomes were discussed with the patient  The patient concurred with the proposed plan, giving informed consent  Cystoscopy was performed today under local anesthesia, using sterile technique   The patient was placed in the supine position, prepped with Betadine, and draped in the usual sterile fashion  The flexible cystocope was used to inspect both the urethra and bladder    Findings:  Urethra:  Normal without stricture    Bladder:  Smooth, not trabeculated and there were no stones tumors or other lesions  The orifices were orthotopic and intact  , but there was a tiny papillary recurrence at the left ureteral orifice  Specimens: none                 Complications:  None           Disposition: To home            Condition:  Stable              The following portions of the patient's history were reviewed and updated as appropriate: allergies, current medications, past family history, past medical history, past social history, past surgical history and problem list     Review of Systems   Review of Systems    Active Problem List     Patient Active Problem List   Diagnosis    Simple partial seizures ([address] )    Chronic right shoulder pain    Rotator cuff tendonitis, right    Right hip pain    Trochanteric bursitis of right hip    Rotator cuff tendonitis, left    Chronic left shoulder pain    Radiculopathy, cervical region       Objective   There were no vitals taken for this visit  Physical Exam   Constitutional: She is oriented to person, place, and time  She appears well-developed and well-nourished  HENT:   Head: Normocephalic and atraumatic  Eyes: EOM are normal    Neck: Normal range of motion  Cardiovascular: Normal rate and regular rhythm  Pulmonary/Chest: She is in respiratory distress  She has wheezes  Abdominal: Soft  She exhibits no distension  There is no tenderness  There is no rebound and no guarding  Musculoskeletal: Normal range of motion  Neurological: She is alert and oriented to person, place, and time  Skin: Skin is warm and dry  Psychiatric: She has a normal mood and affect   Her behavior is normal  Judgment and thought content normal            Current Medications     Current Outpatient Prescriptions:     acetaminophen (TYLENOL) 500 mg tablet, Take 1,000 mg by mouth every 6 (six) hours as needed for mild pain, Disp: , Rfl:     albuterol (PROVENTIL HFA,VENTOLIN HFA) 90 mcg/act inhaler, Inhale 1 puff every 4 (four) hours as needed, Disp: , Rfl:     amoxicillin (AMOXIL) 875 mg tablet, TAKE 1 TABLET BY MOUTH TWICE A DAY FOR 7 DAYS, Disp: , Rfl: 0    atenolol (TENORMIN) 25 mg tablet, 1 daily, Disp: , Rfl:     azelastine (ASTELIN) 0 1 % nasal spray, 2 sprays into each nostril 2 (two) times a day Use in each nostril as directed, Disp: , Rfl:     cefuroxime (CEFTIN) 250 mg tablet, TAKE 1 TABLET (250 MG TOTAL) BY MOUTH 2 (TWO) TIMES A DAY FOR 10 DAYS  WITH FOOD, Disp: , Rfl: 0    clonazePAM (KlonoPIN) 0 5 mg tablet, Take 0 5 mg by mouth 3 (three) times a day, Disp: , Rfl:     clonazePAM (KlonoPIN) 1 mg tablet, TAKE 1/2 TABLET 3 TIMES A DAY, Disp: , Rfl: 1    clotrimazole (CLOTRIMAZOLE ANTI-FUNGAL) 1 % cream, Apply topically as needed, Disp: , Rfl:     ezetimibe (ZETIA) 10 mg tablet, Take 10 mg by mouth daily, Disp: , Rfl: 3    fluticasone (FLONASE) 50 mcg/act nasal spray, 2 sprays into each nostril 2 (two) times a day, Disp: , Rfl:     gabapentin (NEURONTIN) 100 mg capsule, , Disp: , Rfl:     glucose blood (FREESTYLE INSULINX TEST) test strip, Freestyle Lite test strips  Test 8XD  , Disp: , Rfl:     ibuprofen (MOTRIN) 200 mg tablet, Take 600 mg by mouth every 8 (eight) hours as needed for mild pain, Disp: , Rfl:     ibuprofen (MOTRIN) 800 mg tablet, Take 800 mg by mouth 3 (three) times a day, Disp: , Rfl: 0    lamoTRIgine (LaMICtal) 200 MG tablet, TAKE 1 TABLET TWICE DAILY  , Disp: 180 tablet, Rfl: 1    lamoTRIgine (LaMICtal) 25 mg tablet, TAKE 1 (25MG) TABLET BY MOUTH TWICE A DAY IN ADDITION TO 200MG TWICE DAILY, Disp: 180 tablet, Rfl: 3    Lancets 28G MISC, Freestyle Lancets  Test 8XD  , Disp: , Rfl:     meloxicam (MOBIC) 15 mg tablet, , Disp: , Rfl:     metFORMIN (GLUCOPHAGE-XR) 500 mg 24 hr tablet, Take 1,000 mg by mouth 2 (two) times a day  , Disp: , Rfl:     methocarbamol (ROBAXIN) 750 mg tablet, TAKE 1 TABLET BY MOUTH FOUR TIMES A DAY AS NEEDED FOR MUSCLE SPASM, Disp: , Rfl: 0    Methylprednisolone 4 MG TBPK, Use as directed on package, Disp: 21 tablet, Rfl: 0    Multiple Vitamins-Minerals (MULTIVITAMIN GUMMIES ADULT PO), Take 1 capsule by mouth, Disp: , Rfl:     neomycin-polymyxin-hydrocortisone (CORTISPORIN) 1 % SOLN, ADMINISTER 3 DROPS INTO BOTH EARS 4 (FOUR) TIMES A DAY FOR 10 DAYS , Disp: , Rfl: 1    nitrofurantoin (MACROBID) 100 mg capsule, Take 100 mg by mouth as needed, Disp: , Rfl:     omeprazole (PriLOSEC) 20 mg delayed release capsule, Take 20 mg by mouth every morning, Disp: , Rfl:     ondansetron (ZOFRAN) 4 mg tablet, TAKE 1 TABLET BY MOUTH EVERY 8 HOURS AS NEEDED FOR NAUSEA AND VOMITING, Disp: , Rfl: 0    oseltamivir (TAMIFLU) 75 mg capsule, TAKE 1 CAPSULE (75 MG TOTAL) BY MOUTH 2 (TWO) TIMES A DAY FOR 10 DOSES , Disp: , Rfl: 0    oxybutynin (DITROPAN XL) 15 MG 24 hr tablet, , Disp: , Rfl:     phenazopyridine (PYRIDIUM) 100 mg tablet, Take 100 mg by mouth Three times daily as needed, Disp: , Rfl:     pravastatin (PRAVACHOL) 40 mg tablet, Take 40 mg by mouth, Disp: , Rfl:     repaglinide (PRANDIN) 2 mg tablet, Take 2 mg by mouth 3 (three) times a day Take as directed , Disp: , Rfl:     sitaGLIPtin (JANUVIA) 100 mg tablet, Take 100 mg by mouth daily, Disp: , Rfl:     venlafaxine (EFFEXOR-XR) 75 mg 24 hr capsule, Take 75 mg by mouth every evening, Disp: , Rfl:         Viona Peabody, MD

## 2019-02-08 NOTE — H&P
[address] [facility]  [facility]  [address] [facility]  [place], [address] [place] [address]  [phone]  www  [facility]  org      NAME: Svitlana Leary  AGE: 37 y o  SEX: female  : 1975   MRN: [id number]    DATE: 2019  TIME: 8:50 AM    Assessment and Plan:    Bladder cancer with tiny papillary recurrence at the left ureteral orifice  Plan cystoscopy, trans urethral resection of bladder tumor with mitomycin installation  I will use cup biopsy forceps to resect it due to its small size  The risks of bleeding infection and damage to the urinary tract have been explained she gives informed consent  She has had this done before  Chief Complaint   No chief complaint on file  History of Present Illness   Bladder cancer:  Here for surveillance cystoscopy  She is not BCG candidate due to episodes of sepsis recurrent UTI  She underwent TURBT 2017 for a PTA lesion, muscles presents in the specimen and uninvolved  The tumor was high-grade  She had mitomycin instillation at that time  This was her first recurrence  Cystoscopy  Date/Time: 2019 8:51 AM  Performed by: Jan Maurer  Authorized by: Jan Maurer     Procedure details: cystoscopy    Additional Procedure Details: Cystoscopy Procedure Note        Pre-operative Diagnosis:  Bladder cancer, for surveillance cystoscopy    Post-operative Diagnosis:  Same, with recurrence at the left ureteral orifice    Procedure: Flexible cystoscopy    Surgeon: Gunnar Parra MD    Anesthesia: 1% Xylocaine per urethra    EBL: Minimal    Complications: none    Procedure Details   The risks, benefits, complications, treatment options, and expected outcomes were discussed with the patient  The patient concurred with the proposed plan, giving informed consent  Cystoscopy was performed today under local anesthesia, using sterile technique   The patient was placed in the supine position, prepped with Betadine, and draped in the usual sterile fashion  The flexible cystocope was used to inspect both the urethra and bladder    Findings:  Urethra:  Normal without stricture    Bladder:  Smooth, not trabeculated and there were no stones tumors or other lesions  The orifices were orthotopic and intact  , but there was a tiny papillary recurrence at the left ureteral orifice  Specimens: none                 Complications:  None           Disposition: To home            Condition:  Stable              The following portions of the patient's history were reviewed and updated as appropriate: allergies, current medications, past family history, past medical history, past social history, past surgical history and problem list     Review of Systems   Review of Systems    Active Problem List     Patient Active Problem List   Diagnosis    Simple partial seizures ([address] )    Chronic right shoulder pain    Rotator cuff tendonitis, right    Right hip pain    Trochanteric bursitis of right hip    Rotator cuff tendonitis, left    Chronic left shoulder pain    Radiculopathy, cervical region       Objective   There were no vitals taken for this visit  Physical Exam   Constitutional: She is oriented to person, place, and time  She appears well-developed and well-nourished  HENT:   Head: Normocephalic and atraumatic  Eyes: EOM are normal    Neck: Normal range of motion  Cardiovascular: Normal rate and regular rhythm  Pulmonary/Chest: She is in respiratory distress  She has wheezes  Abdominal: Soft  She exhibits no distension  There is no tenderness  There is no rebound and no guarding  Musculoskeletal: Normal range of motion  Neurological: She is alert and oriented to person, place, and time  Skin: Skin is warm and dry  Psychiatric: She has a normal mood and affect   Her behavior is normal  Judgment and thought content normal            Current Medications     Current Outpatient Prescriptions:     acetaminophen (TYLENOL) 500 mg tablet, Take 1,000 mg by mouth every 6 (six) hours as needed for mild pain, Disp: , Rfl:     albuterol (PROVENTIL HFA,VENTOLIN HFA) 90 mcg/act inhaler, Inhale 1 puff every 4 (four) hours as needed, Disp: , Rfl:     amoxicillin (AMOXIL) 875 mg tablet, TAKE 1 TABLET BY MOUTH TWICE A DAY FOR 7 DAYS, Disp: , Rfl: 0    atenolol (TENORMIN) 25 mg tablet, 1 daily, Disp: , Rfl:     azelastine (ASTELIN) 0 1 % nasal spray, 2 sprays into each nostril 2 (two) times a day Use in each nostril as directed, Disp: , Rfl:     cefuroxime (CEFTIN) 250 mg tablet, TAKE 1 TABLET (250 MG TOTAL) BY MOUTH 2 (TWO) TIMES A DAY FOR 10 DAYS  WITH FOOD, Disp: , Rfl: 0    clonazePAM (KlonoPIN) 0 5 mg tablet, Take 0 5 mg by mouth 3 (three) times a day, Disp: , Rfl:     clonazePAM (KlonoPIN) 1 mg tablet, TAKE 1/2 TABLET 3 TIMES A DAY, Disp: , Rfl: 1    clotrimazole (CLOTRIMAZOLE ANTI-FUNGAL) 1 % cream, Apply topically as needed, Disp: , Rfl:     ezetimibe (ZETIA) 10 mg tablet, Take 10 mg by mouth daily, Disp: , Rfl: 3    fluticasone (FLONASE) 50 mcg/act nasal spray, 2 sprays into each nostril 2 (two) times a day, Disp: , Rfl:     gabapentin (NEURONTIN) 100 mg capsule, , Disp: , Rfl:     glucose blood (FREESTYLE INSULINX TEST) test strip, Freestyle Lite test strips  Test 8XD  , Disp: , Rfl:     ibuprofen (MOTRIN) 200 mg tablet, Take 600 mg by mouth every 8 (eight) hours as needed for mild pain, Disp: , Rfl:     ibuprofen (MOTRIN) 800 mg tablet, Take 800 mg by mouth 3 (three) times a day, Disp: , Rfl: 0    lamoTRIgine (LaMICtal) 200 MG tablet, TAKE 1 TABLET TWICE DAILY  , Disp: 180 tablet, Rfl: 1    lamoTRIgine (LaMICtal) 25 mg tablet, TAKE 1 (25MG) TABLET BY MOUTH TWICE A DAY IN ADDITION TO 200MG TWICE DAILY, Disp: 180 tablet, Rfl: 3    Lancets 28G MISC, Freestyle Lancets  Test 8XD  , Disp: , Rfl:     meloxicam (MOBIC) 15 mg tablet, , Disp: , Rfl:     metFORMIN (GLUCOPHAGE-XR) 500 mg 24 hr tablet, Take 1,000 mg by mouth 2 (two) times a day  , Disp: , Rfl:     methocarbamol (ROBAXIN) 750 mg tablet, TAKE 1 TABLET BY MOUTH FOUR TIMES A DAY AS NEEDED FOR MUSCLE SPASM, Disp: , Rfl: 0    Methylprednisolone 4 MG TBPK, Use as directed on package, Disp: 21 tablet, Rfl: 0    Multiple Vitamins-Minerals (MULTIVITAMIN GUMMIES ADULT PO), Take 1 capsule by mouth, Disp: , Rfl:     neomycin-polymyxin-hydrocortisone (CORTISPORIN) 1 % SOLN, ADMINISTER 3 DROPS INTO BOTH EARS 4 (FOUR) TIMES A DAY FOR 10 DAYS , Disp: , Rfl: 1    nitrofurantoin (MACROBID) 100 mg capsule, Take 100 mg by mouth as needed, Disp: , Rfl:     omeprazole (PriLOSEC) 20 mg delayed release capsule, Take 20 mg by mouth every morning, Disp: , Rfl:     ondansetron (ZOFRAN) 4 mg tablet, TAKE 1 TABLET BY MOUTH EVERY 8 HOURS AS NEEDED FOR NAUSEA AND VOMITING, Disp: , Rfl: 0    oseltamivir (TAMIFLU) 75 mg capsule, TAKE 1 CAPSULE (75 MG TOTAL) BY MOUTH 2 (TWO) TIMES A DAY FOR 10 DOSES , Disp: , Rfl: 0    oxybutynin (DITROPAN XL) 15 MG 24 hr tablet, , Disp: , Rfl:     phenazopyridine (PYRIDIUM) 100 mg tablet, Take 100 mg by mouth Three times daily as needed, Disp: , Rfl:     pravastatin (PRAVACHOL) 40 mg tablet, Take 40 mg by mouth, Disp: , Rfl:     repaglinide (PRANDIN) 2 mg tablet, Take 2 mg by mouth 3 (three) times a day Take as directed , Disp: , Rfl:     sitaGLIPtin (JANUVIA) 100 mg tablet, Take 100 mg by mouth daily, Disp: , Rfl:     venlafaxine (EFFEXOR-XR) 75 mg 24 hr capsule, Take 75 mg by mouth every evening, Disp: , Rfl:         Jade Henriquez MD

## 2019-02-08 NOTE — LETTER
2019     Ch[address] Babinski, [address]  [address] [facility] [address]    Patient: Crecencio Meigs   YOB: 1975   Date of Visit: 2019       Dear Dr Kunal Polanco: Thank you for referring Beck Kuhn to me for evaluation  Below are my notes for this consultation  If you have questions, please do not hesitate to call me  I look forward to following your patient along with you  Sincerely,        Antony Jean-Baptiste MD        CC: No Recipients  Antony Jean-Baptiste MD  2019 10:51 AM  Sign at close encounter  [address] [facility]  [place] [id number]  www  [facility]  org      NAME: Crecencio Meigs  AGE: 37 y o  SEX: female  : 1975   MRN: [id number]    DATE: 2019  TIME: 8:50 AM    Assessment and Plan:    Bladder cancer with tiny papillary recurrence at the left ureteral orifice  Plan cystoscopy, trans urethral resection of bladder tumor with mitomycin installation  I will use cup biopsy forceps to resect it due to its small size  The risks of bleeding infection and damage to the urinary tract have been explained she gives informed consent  She has had this done before  Chief Complaint   No chief complaint on file  History of Present Illness   Bladder cancer:  Here for surveillance cystoscopy  She is not BCG candidate due to episodes of sepsis recurrent UTI  She underwent TURBT 2017 for a PTA lesion, muscles presents in the specimen and uninvolved  The tumor was high-grade  She had mitomycin instillation at that time  This was her first recurrence      Cystoscopy  Date/Time: 2019 8:51 AM  Performed by: Luis Bell  Authorized by: Luis Bell     Procedure details: cystoscopy    Additional Procedure Details: Cystoscopy Procedure Note        Pre-operative Diagnosis:  Bladder cancer, for surveillance cystoscopy    Post-operative Diagnosis:  Same, with recurrence at the left ureteral orifice    Procedure: Flexible cystoscopy    Surgeon: Jose Manuel Dooley MD    Anesthesia: 1% Xylocaine per urethra    EBL: Minimal    Complications: none    Procedure Details   The risks, benefits, complications, treatment options, and expected outcomes were discussed with the patient  The patient concurred with the proposed plan, giving informed consent  Cystoscopy was performed today under local anesthesia, using sterile technique  The patient was placed in the supine position, prepped with Betadine, and draped in the usual sterile fashion  The flexible cystocope was used to inspect both the urethra and bladder    Findings:  Urethra:  Normal without stricture    Bladder:  Smooth, not trabeculated and there were no stones tumors or other lesions  The orifices were orthotopic and intact  , but there was a tiny papillary recurrence at the left ureteral orifice  Specimens: none                 Complications:  None           Disposition: To home            Condition:  Stable              The following portions of the patient's history were reviewed and updated as appropriate: allergies, current medications, past family history, past medical history, past social history, past surgical history and problem list     Review of Systems   Review of Systems    Active Problem List     Patient Active Problem List   Diagnosis    Simple partial seizures ([address] )    Chronic right shoulder pain    Rotator cuff tendonitis, right    Right hip pain    Trochanteric bursitis of right hip    Rotator cuff tendonitis, left    Chronic left shoulder pain    Radiculopathy, cervical region       Objective   There were no vitals taken for this visit  Physical Exam   Constitutional: She is oriented to person, place, and time  She appears well-developed and well-nourished  HENT:   Head: Normocephalic and atraumatic  Eyes: EOM are normal    Neck: Normal range of motion  Cardiovascular: Normal rate and regular rhythm  Pulmonary/Chest: She is in respiratory distress  She has wheezes  Abdominal: Soft  She exhibits no distension  There is no tenderness  There is no rebound and no guarding  Musculoskeletal: Normal range of motion  Neurological: She is alert and oriented to person, place, and time  Skin: Skin is warm and dry  Psychiatric: She has a normal mood and affect  Her behavior is normal  Judgment and thought content normal            Current Medications     Current Outpatient Prescriptions:     acetaminophen (TYLENOL) 500 mg tablet, Take 1,000 mg by mouth every 6 (six) hours as needed for mild pain, Disp: , Rfl:     albuterol (PROVENTIL HFA,VENTOLIN HFA) 90 mcg/act inhaler, Inhale 1 puff every 4 (four) hours as needed, Disp: , Rfl:     amoxicillin (AMOXIL) 875 mg tablet, TAKE 1 TABLET BY MOUTH TWICE A DAY FOR 7 DAYS, Disp: , Rfl: 0    atenolol (TENORMIN) 25 mg tablet, 1 daily, Disp: , Rfl:     azelastine (ASTELIN) 0 1 % nasal spray, 2 sprays into each nostril 2 (two) times a day Use in each nostril as directed, Disp: , Rfl:     cefuroxime (CEFTIN) 250 mg tablet, TAKE 1 TABLET (250 MG TOTAL) BY MOUTH 2 (TWO) TIMES A DAY FOR 10 DAYS  WITH FOOD, Disp: , Rfl: 0    clonazePAM (KlonoPIN) 0 5 mg tablet, Take 0 5 mg by mouth 3 (three) times a day, Disp: , Rfl:     clonazePAM (KlonoPIN) 1 mg tablet, TAKE 1/2 TABLET 3 TIMES A DAY, Disp: , Rfl: 1    clotrimazole (CLOTRIMAZOLE ANTI-FUNGAL) 1 % cream, Apply topically as needed, Disp: , Rfl:     ezetimibe (ZETIA) 10 mg tablet, Take 10 mg by mouth daily, Disp: , Rfl: 3    fluticasone (FLONASE) 50 mcg/act nasal spray, 2 sprays into each nostril 2 (two) times a day, Disp: , Rfl:     gabapentin (NEURONTIN) 100 mg capsule, , Disp: , Rfl:     glucose blood (FREESTYLE INSULINX TEST) test strip, Freestyle Lite test strips  Test 8XD  , Disp: , Rfl:     ibuprofen (MOTRIN) 200 mg tablet, Take 600 mg by mouth every 8 (eight) hours as needed for mild pain, Disp: , Rfl:     ibuprofen (MOTRIN) 800 mg tablet, Take 800 mg by mouth 3 (three) times a day, Disp: , Rfl: 0    lamoTRIgine (LaMICtal) 200 MG tablet, TAKE 1 TABLET TWICE DAILY  , Disp: 180 tablet, Rfl: 1    lamoTRIgine (LaMICtal) 25 mg tablet, TAKE 1 (25MG) TABLET BY MOUTH TWICE A DAY IN ADDITION TO 200MG TWICE DAILY, Disp: 180 tablet, Rfl: 3    Lancets 28G MISC, Freestyle Lancets  Test 8XD  , Disp: , Rfl:     meloxicam (MOBIC) 15 mg tablet, , Disp: , Rfl:     metFORMIN (GLUCOPHAGE-XR) 500 mg 24 hr tablet, Take 1,000 mg by mouth 2 (two) times a day  , Disp: , Rfl:     methocarbamol (ROBAXIN) 750 mg tablet, TAKE 1 TABLET BY MOUTH FOUR TIMES A DAY AS NEEDED FOR MUSCLE SPASM, Disp: , Rfl: 0    Methylprednisolone 4 MG TBPK, Use as directed on package, Disp: 21 tablet, Rfl: 0    Multiple Vitamins-Minerals (MULTIVITAMIN GUMMIES ADULT PO), Take 1 capsule by mouth, Disp: , Rfl:     neomycin-polymyxin-hydrocortisone (CORTISPORIN) 1 % SOLN, ADMINISTER 3 DROPS INTO BOTH EARS 4 (FOUR) TIMES A DAY FOR 10 DAYS , Disp: , Rfl: 1    nitrofurantoin (MACROBID) 100 mg capsule, Take 100 mg by mouth as needed, Disp: , Rfl:     omeprazole (PriLOSEC) 20 mg delayed release capsule, Take 20 mg by mouth every morning, Disp: , Rfl:     ondansetron (ZOFRAN) 4 mg tablet, TAKE 1 TABLET BY MOUTH EVERY 8 HOURS AS NEEDED FOR NAUSEA AND VOMITING, Disp: , Rfl: 0    oseltamivir (TAMIFLU) 75 mg capsule, TAKE 1 CAPSULE (75 MG TOTAL) BY MOUTH 2 (TWO) TIMES A DAY FOR 10 DOSES , Disp: , Rfl: 0    oxybutynin (DITROPAN XL) 15 MG 24 hr tablet, , Disp: , Rfl:     phenazopyridine (PYRIDIUM) 100 mg tablet, Take 100 mg by mouth Three times daily as needed, Disp: , Rfl:     pravastatin (PRAVACHOL) 40 mg tablet, Take 40 mg by mouth, Disp: , Rfl:     repaglinide (PRANDIN) 2 mg tablet, Take 2 mg by mouth 3 (three) times a day Take as directed , Disp: , Rfl:     sitaGLIPtin (JANUVIA) 100 mg tablet, Take 100 mg by mouth daily, Disp: , Rfl:     venlafaxine (EFFEXOR-XR) 75 mg 24 hr capsule, Take 75 mg by mouth every evening, Disp: , Rfl:         Shadi Michaels MD

## 2019-02-11 PROBLEM — C67.9 MALIGNANT NEOPLASM OF URINARY BLADDER (HCC): Status: ACTIVE | Noted: 2019-02-11

## 2019-02-11 LAB — BACTERIA UR CULT: NORMAL

## 2019-02-12 DIAGNOSIS — R56.9 SEIZURE (HCC): ICD-10-CM

## 2019-02-12 RX ORDER — LAMOTRIGINE 25 MG/1
TABLET ORAL
Qty: 180 TABLET | Refills: 3 | Status: SHIPPED | OUTPATIENT
Start: 2019-02-12

## 2019-02-14 ENCOUNTER — HOSPITAL ENCOUNTER (OUTPATIENT)
Dept: RADIOLOGY | Age: 44
Discharge: HOME | End: 2019-02-14
Payer: COMMERCIAL

## 2019-02-14 DIAGNOSIS — C67.9 MALIGNANT NEOPLASM OF URINARY BLADDER, UNSPECIFIED SITE (HCC): ICD-10-CM

## 2019-02-14 PROCEDURE — 74178 CT ABD&PLV WO CNTR FLWD CNTR: CPT

## 2019-02-14 RX ADMIN — IOHEXOL 100 ML: 350 INJECTION, SOLUTION INTRAVENOUS at 10:56

## 2019-02-15 ENCOUNTER — ANESTHESIA EVENT (OUTPATIENT)
Dept: PERIOP | Facility: HOSPITAL | Age: 44
End: 2019-02-15
Payer: COMMERCIAL

## 2019-02-19 RX ORDER — CEFDINIR 300 MG/1
300 CAPSULE ORAL EVERY 12 HOURS SCHEDULED
COMMUNITY
End: 2019-03-15 | Stop reason: ALTCHOICE

## 2019-02-19 NOTE — ANESTHESIA PREPROCEDURE EVALUATION
Review of Systems/Medical History  Patient summary reviewed  Chart reviewed  No history of anesthetic complications     Cardiovascular  Hyperlipidemia, Hypertension controlled,    Pulmonary  Asthma , well controlled/ stable Last rescue: < 1 week ago Asthma type of rescue: PRN inhaler, Shortness of breath,        GI/Hepatic  Negative GI/hepatic ROS   GERD , Liver disease (fatty liver) ,        Genitourinary malignancy Bladder cancer,        Endo/Other  Diabetes well controlled type 2 Oral agent,   Obesity    GYN       Hematology  Negative hematology ROS      Musculoskeletal    Arthritis     Neurology  Seizures well controlled,     Psychology   Anxiety, Depression , being treated for depression,              Physical Exam    Airway    Mallampati score: II  TM Distance: >3 FB  Neck ROM: full     Dental   No notable dental hx     Cardiovascular  Rhythm: regular, Rate: normal, Cardiovascular exam normal    Pulmonary  Pulmonary exam normal Breath sounds clear to auscultation,     Other Findings        Anesthesia Plan  ASA Score- 3     Anesthesia Type- general with ASA Monitors  Additional Monitors:   Airway Plan:     Comment: Doesn't like fentanyl  Prefers Morphine for pain        Plan Factors-Patient not instructed to abstain from smoking on day of procedure  Patient did not smoke on day of surgery  Induction- intravenous  Postoperative Plan- Plan for postoperative opioid use  Informed Consent- Anesthetic plan and risks discussed with patient

## 2019-02-27 ENCOUNTER — HOSPITAL ENCOUNTER (OUTPATIENT)
Facility: HOSPITAL | Age: 44
Setting detail: OUTPATIENT SURGERY
Discharge: HOME | End: 2019-02-27
Attending: UROLOGY | Admitting: UROLOGY
Payer: COMMERCIAL

## 2019-02-27 ENCOUNTER — ANESTHESIA (OUTPATIENT)
Dept: PERIOP | Facility: HOSPITAL | Age: 44
End: 2019-02-27
Payer: COMMERCIAL

## 2019-02-27 VITALS
WEIGHT: 234.8 LBS | RESPIRATION RATE: 16 BRPM | BODY MASS INDEX: 37.73 KG/M2 | HEIGHT: 66 IN | OXYGEN SATURATION: 94 % | SYSTOLIC BLOOD PRESSURE: 132 MMHG | DIASTOLIC BLOOD PRESSURE: 63 MMHG | TEMPERATURE: 98.4 F | HEART RATE: 72 BPM

## 2019-02-27 DIAGNOSIS — C67.9 MALIGNANT NEOPLASM OF URINARY BLADDER, UNSPECIFIED SITE (HCC): ICD-10-CM

## 2019-02-27 LAB
EXT PREGNANCY TEST URINE: NEGATIVE
GLUCOSE SERPL-MCNC: 214 MG/DL (ref 65–140)
GLUCOSE SERPL-MCNC: 220 MG/DL (ref 65–140)

## 2019-02-27 PROCEDURE — 88305 TISSUE EXAM BY PATHOLOGIST: CPT | Performed by: PATHOLOGY

## 2019-02-27 PROCEDURE — 88342 IMHCHEM/IMCYTCHM 1ST ANTB: CPT | Performed by: PATHOLOGY

## 2019-02-27 PROCEDURE — 82948 REAGENT STRIP/BLOOD GLUCOSE: CPT

## 2019-02-27 PROCEDURE — 88341 IMHCHEM/IMCYTCHM EA ADD ANTB: CPT | Performed by: PATHOLOGY

## 2019-02-27 PROCEDURE — 88341 IMHCHEM/IMCYTCHM EA ADD ANTB: CPT

## 2019-02-27 PROCEDURE — 81025 URINE PREGNANCY TEST: CPT | Performed by: ANESTHESIOLOGY

## 2019-02-27 PROCEDURE — 88342 IMHCHEM/IMCYTCHM 1ST ANTB: CPT

## 2019-02-27 PROCEDURE — 52234 CYSTOSCOPY AND TREATMENT: CPT | Performed by: UROLOGY

## 2019-02-27 RX ORDER — SODIUM CHLORIDE 9 MG/ML
125 INJECTION, SOLUTION INTRAVENOUS CONTINUOUS
Status: DISCONTINUED | OUTPATIENT
Start: 2019-02-27 | End: 2019-02-27 | Stop reason: HOSPADM

## 2019-02-27 RX ORDER — KETOROLAC TROMETHAMINE 30 MG/ML
INJECTION, SOLUTION INTRAMUSCULAR; INTRAVENOUS AS NEEDED
Status: DISCONTINUED | OUTPATIENT
Start: 2019-02-27 | End: 2019-02-27 | Stop reason: SURG

## 2019-02-27 RX ORDER — MIDAZOLAM HYDROCHLORIDE 1 MG/ML
INJECTION INTRAMUSCULAR; INTRAVENOUS AS NEEDED
Status: DISCONTINUED | OUTPATIENT
Start: 2019-02-27 | End: 2019-02-27 | Stop reason: SURG

## 2019-02-27 RX ORDER — ONDANSETRON 2 MG/ML
4 INJECTION INTRAMUSCULAR; INTRAVENOUS ONCE AS NEEDED
Status: DISCONTINUED | OUTPATIENT
Start: 2019-02-27 | End: 2019-02-27 | Stop reason: HOSPADM

## 2019-02-27 RX ORDER — SORBITOL 30 G/1000ML
IRRIGANT IRRIGATION AS NEEDED
Status: DISCONTINUED | OUTPATIENT
Start: 2019-02-27 | End: 2019-02-27 | Stop reason: HOSPADM

## 2019-02-27 RX ORDER — PROPOFOL 10 MG/ML
INJECTION, EMULSION INTRAVENOUS AS NEEDED
Status: DISCONTINUED | OUTPATIENT
Start: 2019-02-27 | End: 2019-02-27 | Stop reason: SURG

## 2019-02-27 RX ORDER — HYDROCODONE BITARTRATE AND ACETAMINOPHEN 5; 325 MG/1; MG/1
1 TABLET ORAL EVERY 6 HOURS PRN
Status: DISCONTINUED | OUTPATIENT
Start: 2019-02-27 | End: 2019-02-27 | Stop reason: HOSPADM

## 2019-02-27 RX ORDER — HYDROMORPHONE HYDROCHLORIDE 2 MG/ML
INJECTION, SOLUTION INTRAMUSCULAR; INTRAVENOUS; SUBCUTANEOUS AS NEEDED
Status: DISCONTINUED | OUTPATIENT
Start: 2019-02-27 | End: 2019-02-27 | Stop reason: SURG

## 2019-02-27 RX ORDER — ONDANSETRON 2 MG/ML
INJECTION INTRAMUSCULAR; INTRAVENOUS AS NEEDED
Status: DISCONTINUED | OUTPATIENT
Start: 2019-02-27 | End: 2019-02-27 | Stop reason: SURG

## 2019-02-27 RX ADMIN — Medication 2000 MG: at 07:19

## 2019-02-27 RX ADMIN — KETOROLAC TROMETHAMINE 15 MG: 30 INJECTION, SOLUTION INTRAMUSCULAR at 07:39

## 2019-02-27 RX ADMIN — HYDROCODONE BITARTRATE AND ACETAMINOPHEN 1 TABLET: 5; 325 TABLET ORAL at 09:19

## 2019-02-27 RX ADMIN — ONDANSETRON 4 MG: 2 INJECTION INTRAMUSCULAR; INTRAVENOUS at 07:24

## 2019-02-27 RX ADMIN — MIDAZOLAM 4 MG: 1 INJECTION INTRAMUSCULAR; INTRAVENOUS at 07:12

## 2019-02-27 RX ADMIN — SODIUM CHLORIDE 125 ML/HR: 0.9 INJECTION, SOLUTION INTRAVENOUS at 06:36

## 2019-02-27 RX ADMIN — SODIUM CHLORIDE 125 ML/HR: 0.9 INJECTION, SOLUTION INTRAVENOUS at 08:25

## 2019-02-27 RX ADMIN — PROPOFOL 200 MG: 10 INJECTION, EMULSION INTRAVENOUS at 07:17

## 2019-02-27 RX ADMIN — HYDROMORPHONE HYDROCHLORIDE 0.5 MG: 2 INJECTION, SOLUTION INTRAMUSCULAR; INTRAVENOUS; SUBCUTANEOUS at 07:24

## 2019-03-01 ENCOUNTER — TELEPHONE (OUTPATIENT)
Dept: UROLOGY | Facility: AMBULATORY SURGERY CENTER | Age: 44
End: 2019-03-01

## 2019-03-01 DIAGNOSIS — N30.00 ACUTE CYSTITIS WITHOUT HEMATURIA: Primary | ICD-10-CM

## 2019-03-01 RX ORDER — SULFAMETHOXAZOLE AND TRIMETHOPRIM 800; 160 MG/1; MG/1
1 TABLET ORAL 2 TIMES DAILY
Qty: 14 TABLET | Refills: 0 | Status: SHIPPED | OUTPATIENT
Start: 2019-03-01 | End: 2019-03-08

## 2019-03-01 NOTE — TELEPHONE ENCOUNTER
Patient called and c/o chills,burning,not totally emptying-she dribbles after urinating  Urine is clear she said  She is drinking plenty of fluids  Her bowels are moving normally  She just had a TURBT done ,she was told to take Pyridium for the cramping,how long can she take the Pyridium ,she wants to take this for 4-5 days  She has macrobid at home,she is not on a antibiotic,will Bactrim be better for this? Need orders

## 2019-03-13 RX ORDER — AZELASTINE 1 MG/ML
1 SPRAY, METERED NASAL
COMMUNITY
Start: 2019-02-21

## 2019-03-15 ENCOUNTER — OFFICE VISIT (OUTPATIENT)
Dept: UROLOGY | Facility: MEDICAL CENTER | Age: 44
End: 2019-03-15
Payer: COMMERCIAL

## 2019-03-15 VITALS
WEIGHT: 237 LBS | SYSTOLIC BLOOD PRESSURE: 120 MMHG | BODY MASS INDEX: 38.09 KG/M2 | DIASTOLIC BLOOD PRESSURE: 82 MMHG | HEART RATE: 64 BPM | HEIGHT: 66 IN

## 2019-03-15 DIAGNOSIS — N30.00 ACUTE CYSTITIS WITHOUT HEMATURIA: Primary | ICD-10-CM

## 2019-03-15 DIAGNOSIS — C67.6 MALIGNANT NEOPLASM OF URETERIC ORIFICE (HCC): ICD-10-CM

## 2019-03-15 PROCEDURE — 99215 OFFICE O/P EST HI 40 MIN: CPT | Performed by: UROLOGY

## 2019-03-15 PROCEDURE — 81003 URINALYSIS AUTO W/O SCOPE: CPT | Performed by: UROLOGY

## 2019-03-15 NOTE — PROGRESS NOTES
[address] [facility]  [facility]  [address] [facility], [address] [place] [address]  [phone]  www  [facility]  org      NAME: Maude Hernandez  AGE: 37 y o  SEX: female  : 1975   MRN: [id number]    DATE: 3/15/2019  TIME: 10:03 AM    Assessment and Plan:  Bladder cancer as below, plan aggressive restaging with cystoscopy, left ureteroscopy and biopsy if indicated  Also left ureteral stent, and possible left retrograde pyelography  Chief Complaint   No chief complaint on file  History of Present Illness   Bladder CA- 2019 TURBT/Mitomycin for small distal lesion of left ureteral orifice, not involving the lumen itself  She is doing relatively well after the procedure  I feel that it would be best to aggressively stage her with left ureteroscopy to make sure she does not have a proximal tumor given the location on the orifice  The risks of bleeding infection and she you are understands the need for stent have been explained  The following portions of the patient's history were reviewed and updated as appropriate: allergies, current medications, past family history, past medical history, past social history, past surgical history and problem list     Review of Systems   Review of Systems    Active Problem List     Patient Active Problem List   Diagnosis    Simple partial seizures ([address] )    Chronic right shoulder pain    Rotator cuff tendonitis, right    Right hip pain    Trochanteric bursitis of right hip    Rotator cuff tendonitis, left    Chronic left shoulder pain    Radiculopathy, cervical region    Malignant neoplasm of urinary bladder ([address] )       Objective   There were no vitals taken for this visit  Physical Exam   Constitutional: She is oriented to person, place, and time  She appears well-developed and well-nourished  HENT:   Head: Normocephalic and atraumatic     Eyes: EOM are normal    Neck: Normal range of motion  Cardiovascular: Normal rate, regular rhythm and normal heart sounds  Exam reveals no friction rub  No murmur heard  Pulmonary/Chest: Effort normal    Abdominal: Soft  She exhibits no distension  There is no tenderness  Musculoskeletal: Normal range of motion  Neurological: She is alert and oriented to person, place, and time  Skin: Skin is warm and dry  Psychiatric: She has a normal mood and affect  Her behavior is normal  Judgment and thought content normal            Current Medications     Current Outpatient Medications:     acetaminophen (TYLENOL) 500 mg tablet, Take 1,000 mg by mouth every 6 (six) hours as needed for mild pain, Disp: , Rfl:     albuterol (PROVENTIL HFA,VENTOLIN HFA) 90 mcg/act inhaler, Inhale 1 puff every 4 (four) hours as needed, Disp: , Rfl:     albuterol (PROVENTIL HFA,VENTOLIN HFA) 90 mcg/act inhaler, Inhale 1 puff every 4 (four) hours as needed, Disp: , Rfl:     amoxicillin (AMOXIL) 875 mg tablet, TAKE 1 TABLET BY MOUTH TWICE A DAY FOR 7 DAYS, Disp: , Rfl: 0    atenolol (TENORMIN) 25 mg tablet, 1 daily, Disp: , Rfl:     azelastine (ASTELIN) 0 1 % nasal spray, 2 sprays into each nostril 2 (two) times a day Use in each nostril as directed, Disp: , Rfl:     azelastine (ASTELIN) 0 1 % nasal spray, 1 spray into each nostril, Disp: , Rfl:     cefdinir (OMNICEF) 300 mg capsule, Take 300 mg by mouth every 12 (twelve) hours, Disp: , Rfl:     cefuroxime (CEFTIN) 250 mg tablet, TAKE 1 TABLET (250 MG TOTAL) BY MOUTH 2 (TWO) TIMES A DAY FOR 10 DAYS  WITH FOOD, Disp: , Rfl: 0    clonazePAM (KlonoPIN) 0 5 mg tablet, Take 0 5 mg by mouth 3 (three) times a day, Disp: , Rfl:     clotrimazole (CLOTRIMAZOLE ANTI-FUNGAL) 1 % cream, Apply topically as needed, Disp: , Rfl:     fluticasone (FLONASE) 50 mcg/act nasal spray, 2 sprays into each nostril 2 (two) times a day, Disp: , Rfl:     glucose blood (FREESTYLE INSULINX TEST) test strip, Freestyle Lite test strips  Test 8XD  , Disp: , Rfl:     ibuprofen (MOTRIN) 200 mg tablet, Take 600 mg by mouth every 8 (eight) hours as needed for mild pain, Disp: , Rfl:     ibuprofen (MOTRIN) 800 mg tablet, Take 800 mg by mouth 3 (three) times a day, Disp: , Rfl: 0    lamoTRIgine (LaMICtal) 200 MG tablet, TAKE 1 TABLET TWICE DAILY  , Disp: 180 tablet, Rfl: 1    lamoTRIgine (LaMICtal) 25 mg tablet, TAKE 1 (25MG) TABLET BY MOUTH TWICE A DAY IN ADDITION TO 200MG TWICE DAILY, Disp: 180 tablet, Rfl: 3    Lancets 28G MISC, Freestyle Lancets  Test 8XD  , Disp: , Rfl:     meloxicam (MOBIC) 15 mg tablet, , Disp: , Rfl:     metFORMIN (GLUCOPHAGE-XR) 500 mg 24 hr tablet, Take 1,000 mg by mouth 2 (two) times a day  , Disp: , Rfl:     methocarbamol (ROBAXIN) 750 mg tablet, TAKE 1 TABLET BY MOUTH FOUR TIMES A DAY AS NEEDED FOR MUSCLE SPASM, Disp: , Rfl: 0    Methylprednisolone 4 MG TBPK, Use as directed on package (Patient not taking: Reported on 2/8/2019 ), Disp: 21 tablet, Rfl: 0    Multiple Vitamins-Minerals (MULTIVITAMIN GUMMIES ADULT PO), Take 1 capsule by mouth, Disp: , Rfl:     neomycin-polymyxin-hydrocortisone (CORTISPORIN) 1 % SOLN, ADMINISTER 3 DROPS INTO BOTH EARS 4 (FOUR) TIMES A DAY FOR 10 DAYS , Disp: , Rfl: 1    nitrofurantoin (MACROBID) 100 mg capsule, Take 100 mg by mouth as needed, Disp: , Rfl:     omeprazole (PriLOSEC) 20 mg delayed release capsule, Take 20 mg by mouth every morning, Disp: , Rfl:     ondansetron (ZOFRAN) 4 mg tablet, TAKE 1 TABLET BY MOUTH EVERY 8 HOURS AS NEEDED FOR NAUSEA AND VOMITING, Disp: , Rfl: 0    oxybutynin (DITROPAN XL) 15 MG 24 hr tablet, , Disp: , Rfl:     phenazopyridine (PYRIDIUM) 100 mg tablet, Take 100 mg by mouth Three times daily as needed, Disp: , Rfl:     pravastatin (PRAVACHOL) 40 mg tablet, Take 40 mg by mouth, Disp: , Rfl:     repaglinide (PRANDIN) 2 mg tablet, Take 2 mg by mouth 3 (three) times a day Take as directed , Disp: , Rfl:     sitaGLIPtin (JANUVIA) 100 mg tablet, Take 100 mg by mouth daily, Disp: , Rfl:     venlafaxine (EFFEXOR-XR) 75 mg 24 hr capsule, Take 75 mg by mouth every evening, Disp: , Rfl:         Viry Anderson MD

## 2019-03-18 PROBLEM — C67.6 MALIGNANT NEOPLASM OF URETERIC ORIFICE (HCC): Status: ACTIVE | Noted: 2019-03-18

## 2019-03-26 ENCOUNTER — OFFICE VISIT (OUTPATIENT)
Dept: NEUROLOGY | Facility: CLINIC | Age: 44
End: 2019-03-26
Payer: COMMERCIAL

## 2019-03-26 VITALS
WEIGHT: 236 LBS | HEART RATE: 81 BPM | BODY MASS INDEX: 37.93 KG/M2 | HEIGHT: 66 IN | DIASTOLIC BLOOD PRESSURE: 77 MMHG | SYSTOLIC BLOOD PRESSURE: 146 MMHG

## 2019-03-26 DIAGNOSIS — G40.109 SIMPLE PARTIAL SEIZURE DISORDER (HCC): ICD-10-CM

## 2019-03-26 DIAGNOSIS — G40.109 SIMPLE PARTIAL SEIZURES (HCC): Primary | ICD-10-CM

## 2019-03-26 PROCEDURE — 99214 OFFICE O/P EST MOD 30 MIN: CPT | Performed by: PHYSICIAN ASSISTANT

## 2019-03-26 RX ORDER — HYDROCORTISONE ACETATE 25 MG/1
25 SUPPOSITORY RECTAL
COMMUNITY
Start: 2019-03-18 | End: 2021-03-25 | Stop reason: HOSPADM

## 2019-03-26 RX ORDER — LAMOTRIGINE 200 MG/1
TABLET ORAL
Qty: 180 TABLET | Refills: 1 | Status: SHIPPED | OUTPATIENT
Start: 2019-03-26

## 2019-03-26 NOTE — PROGRESS NOTES
Patient ID: Tino [place] is a 37 y o  female  Assessment/Plan:    Simple partial seizures ([address] )  Patient has remained seizure-free, continues on Lamictal 225mg BID  Patient requesting to increase her Lamictal today  She says the Lamictal level seems to be "going down" over time and feels it helps her mood  Discussed with patient today that we are not treating a level  If a patient has remained seizure-free, no side effects, would not change the dose based on the level  Patient adamant that she is "not on the med for seizures", but more for her mood  Discussed we (neurology) are not treating her mood, so I would not make changes to her medication solely based on mood  She has a psychiatrist that she sees very regularly  She feels neurology should be adjusting since her psychiatrist does not prescribe the Lamictal     Patient also says that she has only ever had 1 seizure in the setting of heart block when her lithium was being weaned down over 10 years ago  I spent a great deal of time going back into old records  It seems she also had some "blackout" spells more indicative of syncope and not seizure in the past   I do not personally have a long history with this patient  Discussed that if her seizure was truly a provoked seizure and has not had any recurrence, may consider "stopping" AED, although would not stop the Lamictal since it does help her mood, but rather "transfer" the med to her psychiatrist at they can adjust as they see fit for her mood  Her last EEG was in 2015 and normal     Patient would like me to discuss this with Dr Dee Montgomery, who has been treating her for 10 years  Discussed we could have her see one of our epileptologists for their opinion on whether or not she truly needs Lamictal for seizures and if we would need to continue to monitor this since she has been stable, or if the med can be transferred to her psychiatrist and adjusted based solely on her mood      For now, will cont Lamictal 225mg BID  Will discuss with Dr Jalyn Avila  Diagnoses and all orders for this visit:    Simple partial seizures [facility])    Other orders           Subjective:    HPI    36 y/o female presents for follow up of seizures, last seen one year ago in March 2018  Patient with PMH significant for bipolar disorder, insomnia, diabetes, and seizures  Patient was previously on Topamax but weaned off due to kidney stones and concentration issues  Patient was also on VPA but developed gynecomastia  She has been on Lamictal for a few years now, tolerating well  Patient feels Lamictal has also helped with her mood  Patient was diagnosed with bladder cancer in June 2015  She had a mass removed from her bladder and chemotherapy through a catheter  She has since had a total of 4 surgeries  Patient continues to follow with urology  Patient also follows with her psychiatrist regularly as well  Patient also follows with endocrinology for diabetic management  Today, patient reports she is doing well from a neurologic standpoint  Unfortunately, her bladder cancer has come back and she recently had another surgery  She is following closely with urology  She remains on Lamictal 225mg BID  She denies missing any doses  She reports she has been seizure free  Last Lamictal level done 8/31/18, stable at 4 3  CBC and CMP done more recently in Feb 2019  LFTs normal   Na 134  CBC elevated Hb and Hct, WBCs 11 2  She was sick during the time these labs were drawn  Patient wanted to discuss increasing Lamictal due to "levels seem to be going down" and feels it helps her mood  The following portions of the patient's history were reviewed and updated as appropriate: current medications, past family history, past medical history, past social history, past surgical history and problem list          Objective:    Blood pressure 146/77, pulse 81, height 5' 5 5" (1 664 m), weight 107 kg (236 lb)      Physical Exam Constitutional: She appears well-developed and well-nourished  HENT:   Head: Normocephalic and atraumatic  Eyes: Pupils are equal, round, and reactive to light  EOM are normal    Cardiovascular: Intact distal pulses  Neurological: She has normal strength and normal reflexes  Coordination normal    Skin: Skin is warm and dry  Psychiatric: She has a normal mood and affect  Her speech is normal        Neurological Exam  Mental Status   Oriented to person, place, time and situation  Recent and remote memory are intact  Speech is normal  Language is fluent with no aphasia  Attention and concentration are normal     Cranial Nerves  CN II: Visual fields full to confrontation  CN III, IV, VI: Extraocular movements intact bilaterally  Pupils equal round and reactive to light bilaterally  CN V: Facial sensation is normal   CN VII: Full and symmetric facial movement  CN VIII: Hearing is normal   CN IX, X: Palate elevates symmetrically  Normal gag reflex  CN XI: Shoulder shrug strength is normal   CN XII: Tongue midline without atrophy or fasciculations  Motor   Normal muscle tone  Strength is 5/5 throughout all four extremities  Sensory  Light touch is normal in upper and lower extremities  Reflexes  Deep tendon reflexes are 2+ and symmetric in all four extremities with downgoing toes bilaterally  Coordination  Finger-to-nose, rapid alternating movements and heel-to-shin normal bilaterally without dysmetria  Gait  Casual gait is normal including stance, stride, and arm swing  ROS:    Review of Systems   Constitutional: Positive for unexpected weight change (Recent weight loss)  Negative for appetite change and fever  HENT: Positive for congestion, sinus pressure and sinus pain  Negative for hearing loss, tinnitus, trouble swallowing and voice change  Eyes: Negative  Negative for photophobia and pain  Respiratory: Negative  Negative for shortness of breath      Cardiovascular: Negative  Negative for palpitations  Gastrointestinal: Negative  Negative for nausea and vomiting  Endocrine: Negative  Negative for cold intolerance and heat intolerance  Genitourinary: Negative  Negative for dysuria, frequency and urgency  Musculoskeletal: Positive for arthralgias, back pain, myalgias and neck pain  Pain while walking   Skin: Negative  Negative for rash  Neurological: Negative  Negative for dizziness, tremors, seizures, syncope, facial asymmetry, speech difficulty, weakness, light-headedness, numbness and headaches  Hematological: Negative  Does not bruise/bleed easily  Psychiatric/Behavioral: Positive for sleep disturbance  Negative for confusion and hallucinations  The patient is nervous/anxious (Depression)        I personally reviewed and updated the ROS as appropriate

## 2019-03-26 NOTE — TELEPHONE ENCOUNTER
Looks like pt last saw you in march 2017, ie 2 yrs ago? Pt on lamictal   I am ok with refilling med so she does not run out or have a sz but we need to make sure she is still our pt and pt must come in for an appt in the next month for us to continue to be able to rx her med  Please see if she is continuing to see us and if so, ok to renew rx until her next visit

## 2019-03-26 NOTE — TELEPHONE ENCOUNTER
Patient was seen by you 1 year ago, March 2018  She has an appt scheduled with me today at 2:45    Will hold on refill until patient is seen today

## 2019-03-28 NOTE — ASSESSMENT & PLAN NOTE
Patient has remained seizure-free, continues on Lamictal 225mg BID  Patient requesting to increase her Lamictal today  She says the Lamictal level seems to be "going down" over time and feels it helps her mood  Discussed with patient today that we are not treating a level  If a patient has remained seizure-free, no side effects, would not change the dose based on the level  Patient adamant that she is "not on the med for seizures", but more for her mood  Discussed we (neurology) are not treating her mood, so I would not make changes to her medication solely based on mood  She has a psychiatrist that she sees very regularly  She feels neurology should be adjusting since her psychiatrist does not prescribe the Lamictal     Patient also says that she has only ever had 1 seizure in the setting of heart block when her lithium was being weaned down over 10 years ago  I spent a great deal of time going back into old records  It seems she also had some "blackout" spells more indicative of syncope and not seizure in the past   I do not personally have a long history with this patient  Discussed that if her seizure was truly a provoked seizure and has not had any recurrence, may consider "stopping" AED, although would not stop the Lamictal since it does help her mood, but rather "transfer" the med to her psychiatrist at they can adjust as they see fit for her mood  Her last EEG was in 2015 and normal     Patient would like me to discuss this with Dr Lisa Shin, who has been treating her for 10 years  Discussed we could have her see one of our epileptologists for their opinion on whether or not she truly needs Lamictal for seizures and if we would need to continue to monitor this since she has been stable, or if the med can be transferred to her psychiatrist and adjusted based solely on her mood  For now, will cont Lamictal 225mg BID  Will discuss with Dr Lisa Shin

## 2019-04-04 ENCOUNTER — TELEPHONE (OUTPATIENT)
Dept: UROLOGY | Facility: MEDICAL CENTER | Age: 44
End: 2019-04-04

## 2019-04-05 ENCOUNTER — TELEPHONE (OUTPATIENT)
Dept: NEUROLOGY | Facility: CLINIC | Age: 44
End: 2019-04-05

## 2019-04-08 ENCOUNTER — OFFICE VISIT (OUTPATIENT)
Dept: UROLOGY | Facility: MEDICAL CENTER | Age: 44
End: 2019-04-08
Payer: COMMERCIAL

## 2019-04-08 ENCOUNTER — TELEPHONE (OUTPATIENT)
Dept: UROLOGY | Facility: MEDICAL CENTER | Age: 44
End: 2019-04-08

## 2019-04-08 VITALS
HEIGHT: 66 IN | DIASTOLIC BLOOD PRESSURE: 80 MMHG | WEIGHT: 242 LBS | SYSTOLIC BLOOD PRESSURE: 130 MMHG | BODY MASS INDEX: 38.89 KG/M2 | HEART RATE: 107 BPM

## 2019-04-08 DIAGNOSIS — C67.9 MALIGNANT NEOPLASM OF URINARY BLADDER, UNSPECIFIED SITE (HCC): Primary | ICD-10-CM

## 2019-04-08 LAB
SL AMB  POCT GLUCOSE, UA: ABNORMAL
SL AMB LEUKOCYTE ESTERASE,UA: ABNORMAL
SL AMB POCT BILIRUBIN,UA: ABNORMAL
SL AMB POCT BLOOD,UA: ABNORMAL
SL AMB POCT CLARITY,UA: CLEAR
SL AMB POCT COLOR,UA: YELLOW
SL AMB POCT KETONES,UA: ABNORMAL
SL AMB POCT NITRITE,UA: ABNORMAL
SL AMB POCT PH,UA: 5.5
SL AMB POCT SPECIFIC GRAVITY,UA: 1.01
SL AMB POCT URINE PROTEIN: 100
SL AMB POCT UROBILINOGEN: 0.2

## 2019-04-08 PROCEDURE — 99214 OFFICE O/P EST MOD 30 MIN: CPT | Performed by: UROLOGY

## 2019-04-08 PROCEDURE — 81003 URINALYSIS AUTO W/O SCOPE: CPT | Performed by: UROLOGY

## 2019-04-09 ENCOUNTER — TELEPHONE (OUTPATIENT)
Dept: UROLOGY | Facility: MEDICAL CENTER | Age: 44
End: 2019-04-09

## 2019-04-09 DIAGNOSIS — C67.0 MALIGNANT NEOPLASM OF TRIGONE OF URINARY BLADDER (HCC): Primary | ICD-10-CM

## 2019-04-10 ENCOUNTER — TELEPHONE (OUTPATIENT)
Dept: UROLOGY | Facility: MEDICAL CENTER | Age: 44
End: 2019-04-10

## 2019-04-15 ENCOUNTER — TELEPHONE (OUTPATIENT)
Dept: NEUROLOGY | Facility: CLINIC | Age: 44
End: 2019-04-15

## 2019-04-26 ENCOUNTER — TELEPHONE (OUTPATIENT)
Dept: NEUROLOGY | Facility: CLINIC | Age: 44
End: 2019-04-26

## 2019-05-07 RX ORDER — NITROFURANTOIN 25; 75 MG/1; MG/1
CAPSULE ORAL
Qty: 90 CAPSULE | Refills: 2 | OUTPATIENT
Start: 2019-05-07

## 2019-05-17 ENCOUNTER — TELEPHONE (OUTPATIENT)
Dept: NEUROLOGY | Facility: CLINIC | Age: 44
End: 2019-05-17

## 2019-06-03 ENCOUNTER — OFFICE VISIT (OUTPATIENT)
Dept: CARDIOLOGY CLINIC | Facility: CLINIC | Age: 44
End: 2019-06-03
Payer: COMMERCIAL

## 2019-06-03 VITALS
HEIGHT: 66 IN | WEIGHT: 241 LBS | DIASTOLIC BLOOD PRESSURE: 84 MMHG | HEART RATE: 91 BPM | SYSTOLIC BLOOD PRESSURE: 122 MMHG | BODY MASS INDEX: 38.73 KG/M2

## 2019-06-03 DIAGNOSIS — Z72.0 TOBACCO USE: ICD-10-CM

## 2019-06-03 DIAGNOSIS — E11.9 TYPE 2 DIABETES MELLITUS WITHOUT COMPLICATION, WITHOUT LONG-TERM CURRENT USE OF INSULIN (HCC): ICD-10-CM

## 2019-06-03 DIAGNOSIS — I10 ESSENTIAL HYPERTENSION: Primary | ICD-10-CM

## 2019-06-03 DIAGNOSIS — Z01.810 PRE-OPERATIVE CARDIOVASCULAR EXAMINATION: ICD-10-CM

## 2019-06-03 DIAGNOSIS — E78.5 DYSLIPIDEMIA: ICD-10-CM

## 2019-06-03 PROCEDURE — 93000 ELECTROCARDIOGRAM COMPLETE: CPT | Performed by: INTERNAL MEDICINE

## 2019-06-03 PROCEDURE — 99203 OFFICE O/P NEW LOW 30 MIN: CPT | Performed by: INTERNAL MEDICINE

## 2019-06-03 RX ORDER — METHOCARBAMOL 500 MG/1
TABLET, FILM COATED ORAL
Refills: 1 | COMMUNITY
Start: 2019-05-17

## 2019-10-04 ENCOUNTER — TELEPHONE (OUTPATIENT)
Dept: UROLOGY | Facility: MEDICAL CENTER | Age: 44
End: 2019-10-04

## 2019-10-04 DIAGNOSIS — N30.00 ACUTE CYSTITIS WITHOUT HEMATURIA: Primary | ICD-10-CM

## 2019-10-04 RX ORDER — LEVOFLOXACIN 500 MG/1
500 TABLET, FILM COATED ORAL EVERY 24 HOURS
Qty: 5 TABLET | Refills: 0 | Status: SHIPPED | OUTPATIENT
Start: 2019-10-04 | End: 2019-10-09

## 2019-10-04 NOTE — TELEPHONE ENCOUNTER
Her problem was insurance- I will call her in antibiotics and I would like to see her back for future care if her insurance allows it  She is currently under the care of the urologist in [place] so the need to manage her problems regarding her surgery etc   I sent Levaquin into her pharmacy  If she really thinks that she has a kidney infection with high fevers etc then she needs to go to the emergency department  Otherwise she can simply take the Levaquin

## 2019-10-04 NOTE — TELEPHONE ENCOUNTER
Patient of Dr Taylor Cadet  Patient last seen on 04/30/2019  Patient needed nika bladder surgery which is not done by Dr Taylor Cadet so patient went for 2nd opinion  Since then patient is scheduled for surgery in end of Oct in [place]  Patient does not want to go to [facility] for urology services  Patient wishes to have Dr Taylor Cadet treat her and would like to get appointment for him  Patient states she did 2 out of 4 chemo treatments  Patient states due to chemo she has had double kidney infection and needs to be treated  Please advise

## 2019-10-04 NOTE — TELEPHONE ENCOUNTER
Pt transferred care in April to [facility], Dr Lucian Fountain  She is currently scheduled for Surgery in [facility]  this month  Last seen by Dr Vazquez Paez 4/8/19  Culture results available in Care Everywhere and is requesting Dr Vazquez Paez to treat  Will direct to him for instruction  URINE CULTURE (09/30/2019 11:36 AM EDT)  URINE CULTURE (09/30/2019 11:36 AM EDT)   Component Value Ref Range Performed At Pathologist Signature   Special Requests None   [facility]     Culture Results 50,000 to 100,000 col/mL  Mixed bacterial growth consistent with urogenital and/or skin alexander     [facility])

## 2021-03-22 NOTE — PROGRESS NOTES
[address] [facility]  [facility]  [address] [facility]  [place], [address] [facility]  [phone]  www  [facility]  org      NAME: Ivana Mccormick  AGE: 39 y o  SEX: female  : 1975   MRN: [id number]    DATE: 3/22/2021  TIME: 7:33 PM    Assessment and Plan:      Bladder cancer status post cystectomy now with ileal conduit as below- she is under the care of Gynecology and is to get a CT scan with temple on   She is going to follow-up again with Dr Christie Yusuf and with plastic surgery  I also recommend she follow-up with Gynecology and she has been doing  She has had a lot of complications but I think she is being well managed and CT scan is to check for recurrence of cancer  I told her she will need urologic follow-up in the future with renal ultrasounds simply to check for hydronephrosis and make sure there is no ureteral ileal anastomotic scarring or recurrence of the cancer  One thought is a transvaginal ultrasound if she can tolerate it with the probe  I will go ahead and order this and let   Her know the results  Follow-up with me in 6 months  Chief Complaint   No chief complaint on file  History of Present Illness     Follow-up muscle  Invasive bladder cancer, and since I last saw her she  went on to have  Robot assisted radical cystectomy  At [facility] by Dr Leeanna Ken,  Without TAHBSO, bilateral pelvic lymph node dissection and neobladder urinary diversion on 2019  Pathology showed no cancer  She had undergone   A partial course of nika adjuvant chemotherapy prior  Postoperatively, she had complications with breakdown of anterior vaginal wall with fistulization to neobladder  A transvaginal approach was tried to repair this but this again broke down  She then underwent ileal conduit urinary diversion and removal of her dehisced neobladder on 6/3/2020    A peritoneal flap was brought down based on her left rectus sheath and she had some wound issues from that and continuous drainage of peritoneal fluid from her vagina  She last saw Dr Neetu Kemp 1/8/2021 in the kidneys were functionally normally on Lasix renal scan  She last had a vaginal exam by Dr Neetu Kemp and she had a lot of pain during the exam   She currently has a lot of dyspareunia  She has greenish yellow discharge the comes out daily  She is also having lot of problems with her diabetes  She has had very high glucoses  She is scheduled to have a CT scan on Sunday  She has seen her gynecologist Dr Jes Sol for this -on exam he saw vaginal discharge and bleeding present  There is evidence of vaginal surgery the vagina shortened and irregular mild discharge seen with friability  This was a visit on  12/20/2020  DNA probe tests were done for Candida Gardnerella and Trichomonas which were all negative        The following portions of the patient's history were reviewed and updated as appropriate: allergies, current medications, past family history, past medical history, past social history, past surgical history and problem list   Past Medical History:   Diagnosis Date    Anxiety     Arthritis of lumbar spine     Asthma     "mild"    Bilateral dry eyes     Bilateral shoulder pain     with right arm pain from neck    Bipolar disorder (HCC)     Bladder CA in situ     Bulging of cervical intervertebral disc     C7 and C8    Bursitis of hip, right     Cancer ([facility] )     bladder    Carpal tunnel syndrome, bilateral     Cataract     right    Cervical spine arthritis     Chronic pain disorder     Colitis     Cystitis, chronic     Dental crown present     Depression     Deviated septum     Diabetes mellitus ([facility] )     Family history of factor V Leiden mutation     Mom has;  pt reports " I tested negative"    Fatty liver     GERD (gastroesophageal reflux disease)     Grinding teeth     High cholesterol     History of heart block     "from reaction to Lithium"    History of recent fall     3 weeks ago or so fell on ice    HPV (human papilloma virus) infection     "dormant"    Hypertension     Irritable bowel syndrome     Left knee injury     "years ago"    Nicotine dependence     Psychiatric disorder     Panic disorder/none in a long time    Rotator cuff tendinitis     bilat    Seizures ([address] )     "diagnosed with tonic/clonic seizures from coming off lithium but since on lamictal no seizure x 10 years"    Shortness of breath     mild    Sinus infection     saw Dr Mitch Lai  and is taking antibiotics    Wears glasses      Past Surgical History:   Procedure Laterality Date    BLADDER SURGERY N/A     CHOLECYSTECTOMY      ESOPHAGOGASTRODUODENOSCOPY      GYNECOLOGIC CRYOSURGERY      "for HPV"    INDUCED       "age 25"   [address] [facility] N/A 2019    Procedure: INSTILLATION MITOMYCIN;  Surgeon: Bev Elliott MD;  Location: [facility];  Service: Urology    MN CYSTOURETHROSCOPY,FULGUR <0 5 CM LESN N/A 2019    Procedure: TRANSURETHRAL RESECTION OF BLADDER TUMOR (TURBT); Surgeon: Bev Elliott MD;  Location: [facility];  Service: Urology   [id number] [place] [address]    WISDOM TOOTH EXTRACTION       shoulder  Review of Systems   Review of Systems   Genitourinary:        As per HPI       Active Problem List     Patient Active Problem List   Diagnosis    Simple partial seizures ([address] )    Chronic right shoulder pain    Rotator cuff tendonitis, right    Right hip pain    Trochanteric bursitis of right hip    Rotator cuff tendonitis, left    Chronic left shoulder pain    Radiculopathy, cervical region    Malignant neoplasm of urinary bladder ([address] )    Malignant neoplasm of ureteric orifice ([address] )    Essential hypertension    Dyslipidemia    Type 2 diabetes mellitus without complication, without long-term current use of insulin ([address] )    Tobacco use       Objective   There were no vitals taken for this visit      Physical Exam  Constitutional:       Appearance: Normal appearance  HENT:      Head: Normocephalic and atraumatic  Eyes:      Extraocular Movements: Extraocular movements intact  Neck:      Musculoskeletal: Normal range of motion  Pulmonary:      Effort: Pulmonary effort is normal    Abdominal:      Comments: Abdomen soft nontender nondistended and there is a pink healthy prolapsed stoma in the right upper quadrant  I can feel the mesh in her left lower abdomen where the rectus fascia was taken  No sign of any hernia or infection  Musculoskeletal: Normal range of motion  Skin:     Coloration: Skin is not jaundiced or pale  Neurological:      General: No focal deficit present  Mental Status: She is alert and oriented to person, place, and time  Psychiatric:         Mood and Affect: Mood normal          Behavior: Behavior normal          Thought Content: Thought content normal          Judgment: Judgment normal              Current Medications     Current Outpatient Medications:     acetaminophen (TYLENOL) 500 mg tablet, Take 1,000 mg by mouth every 6 (six) hours as needed for mild pain, Disp: , Rfl:     albuterol (PROVENTIL HFA,VENTOLIN HFA) 90 mcg/act inhaler, Inhale 1 puff every 4 (four) hours as needed, Disp: , Rfl:     atenolol (TENORMIN) 25 mg tablet, 1 daily, Disp: , Rfl:     azelastine (ASTELIN) 0 1 % nasal spray, 1 spray into each nostril, Disp: , Rfl:     clonazePAM (KlonoPIN) 0 5 mg tablet, Take 0 5 mg by mouth 3 (three) times a day, Disp: , Rfl:     clotrimazole (CLOTRIMAZOLE ANTI-FUNGAL) 1 % cream, Apply topically as needed, Disp: , Rfl:     fluticasone (FLONASE) 50 mcg/act nasal spray, 2 sprays into each nostril 2 (two) times a day, Disp: , Rfl:     glucose blood (FREESTYLE INSULINX TEST) test strip, Freestyle Lite test strips  Test 8XD  , Disp: , Rfl:     hydrocortisone (ANUSOL-HC) 25 mg suppository, Insert 25 mg into the rectum, Disp: , Rfl:     ibuprofen (MOTRIN) 800 mg tablet, Take 800 mg by mouth 3 (three) times a day, Disp: , Rfl: 0    lamoTRIgine (LaMICtal) 200 MG tablet, TAKE 1 TABLET TWICE DAILY  , Disp: 180 tablet, Rfl: 1    lamoTRIgine (LaMICtal) 25 mg tablet, TAKE 1 (25MG) TABLET BY MOUTH TWICE A DAY IN ADDITION TO 200MG TWICE DAILY, Disp: 180 tablet, Rfl: 3    Lancets 28G MISC, Freestyle Lancets  Test 8XD  , Disp: , Rfl:     metFORMIN (GLUCOPHAGE-XR) 500 mg 24 hr tablet, Take 1,000 mg by mouth 2 (two) times a day  , Disp: , Rfl:     methocarbamol (ROBAXIN) 500 mg tablet, TAKE 1 TABLET (500 MG TOTAL) BY MOUTH 4 (FOUR) TIMES A DAY AS NEEDED FOR MUSCLE SPASMS , Disp: , Rfl: 1    Multiple Vitamins-Minerals (MULTIVITAMIN GUMMIES ADULT PO), Take 1 capsule by mouth, Disp: , Rfl:     neomycin-polymyxin-hydrocortisone (CORTISPORIN) 1 % SOLN, ADMINISTER 3 DROPS INTO BOTH EARS 4 (FOUR) TIMES A DAY FOR 10 DAYS , Disp: , Rfl: 1    nitrofurantoin (MACROBID) 100 mg capsule, Take 100 mg by mouth as needed, Disp: , Rfl:     omeprazole (PriLOSEC) 20 mg delayed release capsule, Take 20 mg by mouth every morning, Disp: , Rfl:     ondansetron (ZOFRAN) 4 mg tablet, TAKE 1 TABLET BY MOUTH EVERY 8 HOURS AS NEEDED FOR NAUSEA AND VOMITING, Disp: , Rfl: 0    oxybutynin (DITROPAN XL) 15 MG 24 hr tablet, , Disp: , Rfl:     phenazopyridine (PYRIDIUM) 100 mg tablet, Take 100 mg by mouth Three times daily as needed, Disp: , Rfl:     pravastatin (PRAVACHOL) 40 mg tablet, Take 40 mg by mouth, Disp: , Rfl:     repaglinide (PRANDIN) 2 mg tablet, Take 2 mg by mouth 3 (three) times a day Take as directed , Disp: , Rfl:     sitaGLIPtin (JANUVIA) 100 mg tablet, Take 100 mg by mouth daily, Disp: , Rfl:     venlafaxine (EFFEXOR-XR) 75 mg 24 hr capsule, Take 75 mg by mouth every evening, Disp: , Rfl:         Mary Lou Ryan MD

## 2021-03-25 ENCOUNTER — OFFICE VISIT (OUTPATIENT)
Dept: UROLOGY | Facility: MEDICAL CENTER | Age: 46
End: 2021-03-25
Payer: COMMERCIAL

## 2021-03-25 ENCOUNTER — TELEPHONE (OUTPATIENT)
Dept: UROLOGY | Facility: MEDICAL CENTER | Age: 46
End: 2021-03-25

## 2021-03-25 VITALS — SYSTOLIC BLOOD PRESSURE: 142 MMHG | DIASTOLIC BLOOD PRESSURE: 82 MMHG | BODY MASS INDEX: 39.49 KG/M2 | HEIGHT: 66 IN

## 2021-03-25 DIAGNOSIS — C67.0 MALIGNANT NEOPLASM OF TRIGONE OF URINARY BLADDER (HCC): ICD-10-CM

## 2021-03-25 DIAGNOSIS — N89.8 VAGINAL DISCHARGE: ICD-10-CM

## 2021-03-25 DIAGNOSIS — R10.2 PELVIC PAIN: ICD-10-CM

## 2021-03-25 DIAGNOSIS — R10.2 PELVIC PAIN: Primary | ICD-10-CM

## 2021-03-25 DIAGNOSIS — C67.0 MALIGNANT NEOPLASM OF TRIGONE OF URINARY BLADDER (HCC): Primary | ICD-10-CM

## 2021-03-25 PROCEDURE — 99214 OFFICE O/P EST MOD 30 MIN: CPT | Performed by: UROLOGY

## 2021-03-25 RX ORDER — TRIAMCINOLONE ACETONIDE 5 MG/G
CREAM TOPICAL
COMMUNITY
Start: 2021-03-19

## 2021-03-25 RX ORDER — AMOXICILLIN AND CLAVULANATE POTASSIUM 875; 125 MG/1; MG/1
TABLET, FILM COATED ORAL
COMMUNITY
Start: 2021-03-19

## 2021-03-25 RX ORDER — CLOTRIMAZOLE AND BETAMETHASONE DIPROPIONATE 10; .64 MG/G; MG/G
CREAM TOPICAL
COMMUNITY
Start: 2021-03-10

## 2021-03-25 RX ORDER — CHOLECALCIFEROL (VITAMIN D3) 1250 MCG
CAPSULE ORAL
COMMUNITY
Start: 2020-12-25

## 2021-03-25 RX ORDER — CLOTRIMAZOLE 10 MG/1
LOZENGE ORAL; TOPICAL
COMMUNITY
Start: 2021-03-19

## 2021-03-25 RX ORDER — OFLOXACIN 3 MG/ML
SOLUTION AURICULAR (OTIC)
COMMUNITY
Start: 2021-03-19

## 2021-03-25 RX ORDER — INSULIN GLARGINE 100 [IU]/ML
INJECTION, SOLUTION SUBCUTANEOUS
COMMUNITY
Start: 2021-02-12

## 2021-03-25 NOTE — TELEPHONE ENCOUNTER
Patient of Dr Chelo Cassidy  [facility] calling to get ultrasound orders faxed to [phone]  [facility] wanted office to know patient is getting a cat scan on Sunday of abdominal and wanted to know is ultrasound of abdominal is needed    [facility] did schedule the ultrasound transvaginal with pelvis and is requesting updated orders to be faxed to [phone]

## 2021-04-01 NOTE — TELEPHONE ENCOUNTER
Patient managed by Rosalinda Evans is calling to say he results are back from CT done at [facility] and would like him to review and advise if she needs to be seen sooner that July  She will also get US  Stated she feels very comfortable with Rosalinda Evans and trusts his opinion

## 2021-04-01 NOTE — TELEPHONE ENCOUNTER
Spoke to pt and advised Dr Gene Basilio is out of office until Wed 4/7  She stated she would wait for his return to review CT scan performed on 3/28/21 at   She asked that Dr Gene Basilio compare it to the previous CT on 3/4/20

## 2021-04-08 NOTE — TELEPHONE ENCOUNTER
Called patient- she is told of Dr Lupillo Baxter recommendations  She is going to be talking to the doctors in [facility] and will call back to see if she wants to be seen sooner

## 2021-04-08 NOTE — TELEPHONE ENCOUNTER
It basically looks okay  There is no sign of recurrence of tumor or spread  She has a small hernia around the ileal conduit itself but there is nothing to do with that presently  No further recommendations at this time

## 2021-04-20 NOTE — TELEPHONE ENCOUNTER
Call placed to patient and spoke with her  Pt is asking if the transvaginal US she is scheduled to have tomorrow will show a fistula? She was told by the radiology department that it will not show a fistula  Pt is asking Dr Chelo Cassidy what testing she would need to get done to show a fistula because she feels like she has one and does not want to waste her time tomorrow  She is scheduled to see GYN Oncology next week and is wondering if she needs to ask them this question  Routing to Dr Chelo Cassidy for review and recommendations

## 2021-04-20 NOTE — TELEPHONE ENCOUNTER
Patient called in asking asking to speak with clinical regarding a US test Dr Kimi Rose ordered for her  Patient stated her test is scheduled for tomorrow evening   Patient can be reached at [phone]

## 2021-04-21 NOTE — TELEPHONE ENCOUNTER
Call placed to patient and spoke with her  Informed her of the recommendations of the CRNP  Pt is aware and was thankful for the call back

## 2021-04-21 NOTE — TELEPHONE ENCOUNTER
Call placed to patient and spoke with her  Informed her that Dr Elisabeth Gusman is in the OR today and may not be able to respond to this message until after his surgical cases  Pt understands  She is asking if I can please ask CRNP to review her question and provide feedback  Will route to CRNP for clarification of her question

## 2021-04-21 NOTE — TELEPHONE ENCOUNTER
Typically this is diagnosed clinically or via CT Scan   I am not aware of the ability to diagnose this on [address] [facility]

## 2022-07-21 NOTE — PROGRESS NOTES
[address] [facility]  [facility]  [address] [facility] [address] Melany  [place], [address] [facility]  [phone]  www  [facility]  org      NAME: Toño Castelan  AGE: 55 y o  SEX: female  : 1975   MRN: [id number]    DATE: 2022  TIME: 10:38 AM    Assessment and Plan:    Bladder cancer status post cystectomy with ileal conduit as below  Yeast vaginitis, treated with Diflucan 1 dose  Bile salt malabsorption, treat with Questran 1 packet p o  B i d  Hopefully this will help with the diarrhea  With regards to follow-up of her cancer, she still has her urethra so we will scope this in the office in the near future and we will scope the vagina as well given that she may still have an anterior wall defect  I will leave it up to her gynecologist to follow-up with her cbc because of her heavy periods and possible anemia due to this  Should follow up with Nephrology regarding her metabolic acidosis  Chief Complaint     Chief Complaint   Patient presents with    malignant neoplasm of trigone of urinary bladder       History of Present Illness   Bladder cancer status post cystectomy with ileal conduit-    Follow-up muscle  Invasive bladder cancer, and since I last saw her she  went on to have  Robot assisted radical cystectomy  At [facility] by Dr Adrian Oscar,  Without TAHBSO, bilateral pelvic lymph node dissection and neobladder urinary diversion on 2019  Pathology showed no cancer  She had undergone   A partial course of nika adjuvant chemotherapy prior  Postoperatively, she had complications with breakdown of anterior vaginal wall with fistulization to neobladder  She still has ovaries and uterus  A transvaginal approach was tried to repair this but this again broke down  She then underwent ileal conduit urinary diversion and removal of her dehisced neobladder on 6/3/2020    A peritoneal flap was brought down based on her left rectus sheath and she had some wound issues from that and continuous drainage of peritoneal fluid from her vagina  She last saw Dr Sarai Gilbert 1/8/2021 in the kidneys were functionally normally on Lasix renal scan  She last had a vaginal exam by Dr Sarai Gilbert and she had a lot of pain during the exam   She currently has a lot of dyspareunia  She was had greenish yellow discharge the comes out daily which has now resolved  She is also having lot of problems with her diabetes  She has had very high glucoses  She has seen her gynecologist Dr Rehana Heaton for this -on exam he saw vaginal discharge and bleeding present  There is evidence of vaginal surgery the vagina shortened and irregular mild discharge seen with friability     DNA probe tests were done for Candida Gardnerella and Trichomonas which were all negative  She had CT scan abdomen and pelvis 2/20/2022 with IV contrast showing no acute abdominal pelvic abnormality, postoperative changes status post cystectomy with ileal conduit, small fat containing parastomal hernia  She is on Depo-Provera to regulate her periods  She has been losing a lot of blood due to this  Creatinine was 1 4 4/3/2022, and it was 1 30 almost 1 year ago, April 2022 Cr was 1 53  Having problems with metabolic acidosis  Also complains of use vaginitis and I called in Diflucan to her pharmacy 1 dose 200 mg  She is having lot of diarrhea which is probably due to poor bile salt reabsorption and I suggested Questran for this  She is having increasing right lower quadrant pain and she does have a parastomal hernia consistent with what was seen in her CT scan in February  She also has been feeling a lot of fatigue and she believes this is due to the blood loss from her periods  Her stoma is pink and healthy      The following portions of the patient's history were reviewed and updated as appropriate: allergies, current medications, past family history, past medical history, past social history, past surgical history and problem list   Past Medical History:   Diagnosis Date    Anxiety     Arthritis of lumbar spine     Asthma     "mild"    Bilateral dry eyes     Bilateral shoulder pain     with right arm pain from neck    Bipolar disorder (HCC)     Bladder CA in situ     Bulging of cervical intervertebral disc     C7 and C8    Bursitis of hip, right     Cancer (HCC)     bladder    Carpal tunnel syndrome, bilateral     Cataract     right    Cervical spine arthritis     Chronic pain disorder     Colitis     Cystitis, chronic     Dental crown present     Depression     Deviated septum     Diabetes mellitus ([facility] [address] )     Family history of factor V Leiden mutation     Mom has;  pt reports " I tested negative"    Fatty liver     GERD (gastroesophageal reflux disease)     Grinding teeth     High cholesterol     History of heart block     "from reaction to Lithium"    History of recent fall     3 weeks ago or so fell on ice    HPV (human papilloma virus) infection     "dormant"    Hypertension     Irritable bowel syndrome     Left knee injury     "years ago"    Nicotine dependence     Psychiatric disorder     Panic disorder/none in a long time    Rotator cuff tendinitis     bilat    Seizures ([facility] [address] )     "diagnosed with tonic/clonic seizures from coming off lithium but since on lamictal no seizure x 10 years"    Shortness of breath     mild    Sinus infection     saw Dr Carlos Manuel Rosario  and is taking antibiotics    Wears glasses      Past Surgical History:   Procedure Laterality Date    BLADDER SURGERY N/A     CHOLECYSTECTOMY      ESOPHAGOGASTRODUODENOSCOPY      GYNECOLOGIC CRYOSURGERY      "for HPV"    INDUCED       "age 25"   [address] [facility] N/A 2019    Procedure: INSTILLATION MITOMYCIN;  Surgeon: Richard Perez MD;  Location: [facility];  Service: Urology    [place] CYSTOURETHROSCOPY,FULGUR <0 5 CM DILLON N/A 2019    Procedure: TRANSURETHRAL RESECTION OF BLADDER TUMOR (TURBT);   Surgeon: Audelia Millan MD;  Location: [facility] OR;  Service: Urology   [address] [address]    WISDOM TOOTH EXTRACTION       shoulder  Review of Systems   Review of Systems   Constitutional: Positive for fatigue  Gastrointestinal: Positive for diarrhea  Genitourinary:        As per HPI       Active Problem List     Patient Active Problem List   Diagnosis    Simple partial seizures ([address] )    Chronic right shoulder pain    Rotator cuff tendonitis, right    Right hip pain    Trochanteric bursitis of right hip    Rotator cuff tendonitis, left    Chronic left shoulder pain    Radiculopathy, cervical region    Malignant neoplasm of urinary bladder (HCC)    Malignant neoplasm of ureteric orifice (HCC)    Essential hypertension    Dyslipidemia    Type 2 diabetes mellitus without complication, without long-term current use of insulin (HCC)    Tobacco use    Uncontrolled type 2 diabetes mellitus with hyperglycemia (HCC)    Stage 3a chronic kidney disease (HCC)       Objective   /82 (BP Location: Left arm, Patient Position: Sitting, Cuff Size: Adult)   Pulse 74   Ht 5' 5" (1 651 m)   Wt 111 kg (244 lb)   BMI 40 60 kg/m²     Physical Exam  Vitals reviewed  Constitutional:       Appearance: Normal appearance  HENT:      Head: Normocephalic and atraumatic  Eyes:      Extraocular Movements: Extraocular movements intact  Pulmonary:      Effort: Pulmonary effort is normal    Abdominal:      Palpations: Abdomen is soft  Hernia: A hernia is present  Comments: Stoma as described   Musculoskeletal:      Cervical back: Normal range of motion  Neurological:      Mental Status: She is alert               Current Medications     Current Outpatient Medications:     atenolol (TENORMIN) 25 mg tablet, 1 daily, Disp: , Rfl:     azelastine (ASTELIN) 0 1 % nasal spray, 1 spray into each nostril, Disp: , Rfl:     Cholecalciferol (Vitamin D3) 1 25 MG (94526 UT) CAPS, TAKE 1 CAPSULE BY MOUTH ONE TIME PER WEEK, Disp: , Rfl:     cholestyramine (QUESTRAN) 4 g packet, Take 1 packet (4 g total) by mouth 2 (two) times a day with meals, Disp: 60 packet, Rfl: 11    clonazePAM (KlonoPIN) 0 5 mg tablet, Take 0 5 mg by mouth 3 (three) times a day, Disp: , Rfl:     clotrimazole (LOTRIMIN) 1 % cream, Apply topically as needed, Disp: , Rfl:     clotrimazole (MYCELEX) 10 mg meliton, , Disp: , Rfl:     clotrimazole-betamethasone (LOTRISONE) 1-0 05 % cream, APPLY TO AFFECTED AREA(S) AS DIRECTED, Disp: , Rfl:     fluconazole (DIFLUCAN) 200 mg tablet, Take 1 tablet (200 mg total) by mouth daily for 1 day, Disp: 1 tablet, Rfl: 0    fluticasone (FLONASE) 50 mcg/act nasal spray, 2 sprays into each nostril 2 (two) times a day, Disp: , Rfl:     glucose blood test strip, Freestyle Lite test strips  Test 8XD  , Disp: , Rfl:     lamoTRIgine (LaMICtal) 200 MG tablet, TAKE 1 TABLET TWICE DAILY  , Disp: 180 tablet, Rfl: 1    lamoTRIgine (LaMICtal) 25 mg tablet, TAKE 1 (25MG) TABLET BY MOUTH TWICE A DAY IN ADDITION TO 200MG TWICE DAILY, Disp: 180 tablet, Rfl: 3    metFORMIN (GLUCOPHAGE-XR) 500 mg 24 hr tablet, Take 1,000 mg by mouth 2 (two) times a day  , Disp: , Rfl:     omeprazole (PriLOSEC) 20 mg delayed release capsule, Take 20 mg by mouth every morning, Disp: , Rfl:     pravastatin (PRAVACHOL) 40 mg tablet, Take 40 mg by mouth, Disp: , Rfl:     sitaGLIPtin (JANUVIA) 100 mg tablet, Take 100 mg by mouth daily, Disp: , Rfl:     Tresiba FlexTouch 200 units/mL CONCENTRATED U-200 injection pen, INJECT 140 UNITS UNDER THE SKIN DAILY  , Disp: , Rfl:     venlafaxine (EFFEXOR-XR) 75 mg 24 hr capsule, Take 75 mg by mouth every evening, Disp: , Rfl:     amoxicillin-clavulanate (AUGMENTIN) 875-125 mg per tablet, , Disp: , Rfl:     ibuprofen (MOTRIN) 800 mg tablet, Take 800 mg by mouth 3 (three) times a day, Disp: , Rfl: 0    Lancets 28G MISC, Freestyle Lancets  Test 8XD  , Disp: , Rfl:     Lantus SoloStar 100 units/mL injection pen, INJECT 10 UNITS UNDER THE SKIN DAILY  , Disp: , Rfl:     methocarbamol (ROBAXIN) 500 mg tablet, TAKE 1 TABLET (500 MG TOTAL) BY MOUTH 4 (FOUR) TIMES A DAY AS NEEDED FOR MUSCLE SPASMS , Disp: , Rfl: 1    ofloxacin (FLOXIN) 0 3 % otic solution, , Disp: , Rfl:     ondansetron (ZOFRAN) 4 mg tablet, TAKE 1 TABLET BY MOUTH EVERY 8 HOURS AS NEEDED FOR NAUSEA AND VOMITING, Disp: , Rfl: 0    repaglinide (PRANDIN) 2 mg tablet, Take 2 mg by mouth 3 (three) times a day Take as directed , Disp: , Rfl:     triamcinolone (KENALOG) 0 5 % cream, , Disp: , Rfl:         Janice Haddad MD

## 2022-07-22 ENCOUNTER — OFFICE VISIT (OUTPATIENT)
Dept: UROLOGY | Facility: MEDICAL CENTER | Age: 47
End: 2022-07-22
Payer: MEDICARE

## 2022-07-22 VITALS
HEART RATE: 74 BPM | HEIGHT: 65 IN | DIASTOLIC BLOOD PRESSURE: 82 MMHG | WEIGHT: 244 LBS | BODY MASS INDEX: 40.65 KG/M2 | SYSTOLIC BLOOD PRESSURE: 134 MMHG

## 2022-07-22 DIAGNOSIS — B37.31 YEAST VAGINITIS: Primary | ICD-10-CM

## 2022-07-22 DIAGNOSIS — C67.9 MALIGNANT NEOPLASM OF URINARY BLADDER, UNSPECIFIED SITE (HCC): ICD-10-CM

## 2022-07-22 DIAGNOSIS — K90.9 DIARRHEA DUE TO MALABSORPTION: ICD-10-CM

## 2022-07-22 DIAGNOSIS — R19.7 DIARRHEA DUE TO MALABSORPTION: ICD-10-CM

## 2022-07-22 DIAGNOSIS — N18.31 STAGE 3A CHRONIC KIDNEY DISEASE (HCC): ICD-10-CM

## 2022-07-22 DIAGNOSIS — E11.65 UNCONTROLLED TYPE 2 DIABETES MELLITUS WITH HYPERGLYCEMIA (HCC): ICD-10-CM

## 2022-07-22 PROCEDURE — 99214 OFFICE O/P EST MOD 30 MIN: CPT | Performed by: UROLOGY

## 2022-07-22 RX ORDER — CHOLESTYRAMINE 4 G/9G
1 POWDER, FOR SUSPENSION ORAL 2 TIMES DAILY WITH MEALS
Qty: 60 PACKET | Refills: 11 | Status: SHIPPED | OUTPATIENT
Start: 2022-07-22 | End: 2022-08-13

## 2022-07-22 RX ORDER — FLUCONAZOLE 200 MG/1
200 TABLET ORAL DAILY
Qty: 1 TABLET | Refills: 0 | Status: SHIPPED | OUTPATIENT
Start: 2022-07-22 | End: 2022-07-23

## 2022-07-22 RX ORDER — INSULIN DEGLUDEC 200 U/ML
INJECTION, SOLUTION SUBCUTANEOUS
COMMUNITY
Start: 2022-06-21

## 2022-08-03 ENCOUNTER — TELEPHONE (OUTPATIENT)
Dept: UROLOGY | Facility: AMBULATORY SURGERY CENTER | Age: 47
End: 2022-08-03

## 2022-08-03 NOTE — TELEPHONE ENCOUNTER
[employer] called and stated that they fax over an order for ostomy supplies and wound care supplies  It was faxed on 07/27/22   They wanted to know if it was received     Trae from [employer] can be reached at [phone]

## 2022-08-05 NOTE — TELEPHONE ENCOUNTER
According to patient's chart, Dr Barrett Deleon signed order today from [employer] and it was faxed as requested  Order has been scanned into the chart

## 2022-08-13 DIAGNOSIS — K90.9 DIARRHEA DUE TO MALABSORPTION: ICD-10-CM

## 2022-08-13 DIAGNOSIS — R19.7 DIARRHEA DUE TO MALABSORPTION: ICD-10-CM

## 2022-08-13 RX ORDER — CHOLESTYRAMINE 4 G/9G
POWDER, FOR SUSPENSION ORAL
Qty: 180 PACKET | Refills: 4 | Status: SHIPPED | OUTPATIENT
Start: 2022-08-13 | End: 2022-10-13 | Stop reason: ALTCHOICE

## 2022-08-16 DIAGNOSIS — N76.1 CHRONIC VAGINITIS: Primary | ICD-10-CM

## 2022-08-27 ENCOUNTER — NURSE TRIAGE (OUTPATIENT)
Dept: OTHER | Facility: OTHER | Age: 47
End: 2022-08-27

## 2022-08-27 NOTE — TELEPHONE ENCOUNTER
Reason for Disposition   High-risk adult (e g , diabetes mellitus, brain tumor, V-P shunt, hernia)    Answer Assessment - Initial Assessment Questions  1  VOMITING SEVERITY: "How many times have you vomited in the past 24 hours?"      - MILD:  1 - 2 times/day     - MODERATE: 3 - 5 times/day, decreased oral intake without significant weight loss or symptoms of dehydration     - SEVERE: 6 or more times/day, vomits everything or nearly everything, with significant weight loss, symptoms of dehydration      vomiting for last 2 weeks   2  ONSET: "When did the vomiting begin?"      About 2 weeks ago   3  FLUIDS: "What fluids or food have you vomited up today?" "Have you been able to keep any fluids down?"     Staying hydrated but continues to vomit  4  ABDOMINAL PAIN: "Are your having any abdominal pain?" If yes : "How bad is it and what does it feel like?" (e g , crampy, dull, intermittent, constant)       Stomach pain and sides   5  DIARRHEA: "Is there any diarrhea?" If Yes, ask: "How many times today?"     Chronic diarrhea  6  CONTACTS: "Is there anyone else in the family with the same symptoms?"       *No Answer*  7  CAUSE: "What do you think is causing your vomiting?"      unsure  8  HYDRATION STATUS: "Any signs of dehydration?" (e g , dry mouth [not only dry lips], too weak to stand) "When did you last urinate?"      States she is always dehydration   9  OTHER SYMPTOMS: "Do you have any other symptoms?" (e g , fever, headache, vertigo, vomiting blood or coffee grounds, recent head injury)     Stoma looks ok increased fluid in stoma bag  Blue/purple on side of bag   Drank blue Gatorade      Protocols used: [facility] AND [facility] CHIKA

## 2022-08-27 NOTE — TELEPHONE ENCOUNTER
Regarding: Vomited purple blue color in stoma bag  ----- Message from Anila Mathur sent at 8/27/2022 12:21 PM EDT -----  I have a stoma bag and I've been gagging for a long time  I just vomited and there's purple/blue in my bag  Maybe it's my kidneys  "

## 2022-08-27 NOTE — TELEPHONE ENCOUNTER
Patient called in stating she is having right sided pain that radiates to the back  States she been vomiting for last couple of weeks and with current symptoms she is concerned about kidney infection  States bag is filled with blue/purple fluid that was not there on last night  Unsure if she has fever  On call provider advising ED       Called patient advised of providers recommendations

## 2022-08-29 ENCOUNTER — PATIENT MESSAGE (OUTPATIENT)
Dept: UROLOGY | Facility: MEDICAL CENTER | Age: 47
End: 2022-08-29

## 2022-08-29 ENCOUNTER — TELEPHONE (OUTPATIENT)
Dept: UROLOGY | Facility: MEDICAL CENTER | Age: 47
End: 2022-08-29

## 2022-08-29 NOTE — TELEPHONE ENCOUNTER
Tereso Cristina     GS    8/29/22 11:15 AM  Note  Summary: pt returning  call       Patient of Dr Maninder Trujillo at [address]  Patient called stating she is returning Triage nurse call  She stated she was in the ER at [facility] on Saturday  She was concern about having purple color urine in there bag from stoma  She stated she just want to know how to proceed   Not able to document on [facility] encounter        Patient can be reached at [phone]

## 2022-08-29 NOTE — TELEPHONE ENCOUNTER
Patient states that she went to Ed  ,notied that stoma her urine is now purple   Nausea for long time  Patient has diagnosis of acidosis   Pcp gave patient different advise than Dr Ingrid Orozco who advised her to see a nephrologist    Also was advised to see GI after sending a my chart message due to Nausea, vomiting and taste change  Was given Zofran by Yue Holley yesterday 8/27/22 which seems to help  Calling about the purple urine and tenderness / abd , back and sides  Reports no fevers, how ever since having CA temp never really shows  Stoma is draining with out difficulty  Urine color clear to yellow  Was not in urine was reported by opening by stoma     Also reported more mucus than normal

## 2022-08-29 NOTE — TELEPHONE ENCOUNTER
I spoke with pt and provided her with the recommendations and information per Brianna Tenorio  Pt understood  She has a cysto scheduled in 10/2022 with Dr Cabezas and will keep this appt

## 2022-08-29 NOTE — TELEPHONE ENCOUNTER
Patient of Dr Veda Ascencio at [facility]    Patient called stating she is returning Triage nurse call  She stated she was in the ER at [facility] on Saturday  She was concern about having purple color urine in there bag from stoma  She stated she just want to know how to proceed  Not able to document on Healthcall encounter        Patient can be reached at [phone]

## 2022-08-29 NOTE — TELEPHONE ENCOUNTER
Reviewed notes from recent ER visit and results of testing  No evidence of infection or microhematuria, CT scan unremarkable  Would recommend patient increase water intake, and monitor for symptoms of acute infection  Patient will follow up as scheduled

## 2022-10-13 RX ORDER — OMEPRAZOLE 40 MG/1
CAPSULE, DELAYED RELEASE ORAL
COMMUNITY

## 2022-10-13 RX ORDER — MEDROXYPROGESTERONE ACETATE 150 MG/ML
INJECTION, SUSPENSION INTRAMUSCULAR
COMMUNITY
Start: 2022-07-12

## 2022-10-13 RX ORDER — ONDANSETRON 4 MG/1
TABLET, ORALLY DISINTEGRATING ORAL AS NEEDED
COMMUNITY
Start: 2022-08-28

## 2022-10-13 RX ORDER — MEDROXYPROGESTERONE ACETATE 5 MG/1
5 TABLET ORAL DAILY
COMMUNITY
Start: 2022-08-23 | End: 2022-11-18

## 2022-10-13 RX ORDER — FERROUS SULFATE 325(65) MG
TABLET ORAL
COMMUNITY
Start: 2022-08-09

## 2022-10-13 RX ORDER — ACETAMINOPHEN 325 MG/1
TABLET ORAL
COMMUNITY
End: 2023-05-12 | Stop reason: ALTCHOICE

## 2022-10-27 ENCOUNTER — TELEPHONE (OUTPATIENT)
Dept: UROLOGY | Facility: MEDICAL CENTER | Age: 47
End: 2022-10-27

## 2022-10-27 NOTE — TELEPHONE ENCOUNTER
Called this patient to see if she wanted to come tomorrow since you had some cancellaitons  Patient said she is really having pain with her "female parts" and did not want to try having the "other exam" besides the cystoscopy  Wanted you to know that she is having a nerve block done 10/31/22    Cynthia Morgan you can check the note from Dr Roger Banegas

## 2022-11-18 ENCOUNTER — PROCEDURE VISIT (OUTPATIENT)
Dept: UROLOGY | Facility: MEDICAL CENTER | Age: 47
End: 2022-11-18

## 2022-11-18 VITALS
BODY MASS INDEX: 39.15 KG/M2 | HEART RATE: 96 BPM | WEIGHT: 235 LBS | SYSTOLIC BLOOD PRESSURE: 140 MMHG | HEIGHT: 65 IN | DIASTOLIC BLOOD PRESSURE: 80 MMHG

## 2022-11-18 DIAGNOSIS — R10.2 PELVIC PAIN: ICD-10-CM

## 2022-11-18 DIAGNOSIS — C67.9 MALIGNANT NEOPLASM OF URINARY BLADDER, UNSPECIFIED SITE (HCC): Primary | ICD-10-CM

## 2022-11-18 RX ORDER — DULAGLUTIDE 0.75 MG/.5ML
0.75 INJECTION, SOLUTION SUBCUTANEOUS WEEKLY
COMMUNITY
Start: 2022-10-13

## 2022-11-18 NOTE — PROGRESS NOTES
[address] [facility]  [facility]  [address] [facility]  [place], [address] [facility]  [phone]  www  [facility]  org      NAME: Macrina Narayanan  AGE: 52 y o  SEX: female  : 1975   MRN: [id number]    DATE: 2022  TIME: 10:57 AM    Assessment and Plan:    Bladder cancer status post radical cystectomy with complications afterwards leading to ileal conduit and reconstruction of the pelvis  Her vaginal pouch and urethral stump looked normal   No evidence recurrence of cancer  I see no signs of infection trauma or anything else  I agree with Dr Kathrin Cooney in his approach to treating this as a neuropathy with pudendal nerve blocks  The discharge I was able to evaluate by photograph she brought in this looks like a piece of fibrin from an old clot  I am not sure where the source of the bleeding was from but I can see nothing that would cause it on my exam   Follow with me in 6 months  I will make determination regarding imaging at that visit  Chief Complaint   No chief complaint on file  History of Present Illness     Bladder cancer status post cystectomy with ileal conduit as documented before, last seen by me in 2022- she has been having problems with pain in the vagina and discharge  She is here for cystoscopy of her urethral stump as well as the vagina  She underwent pudendal nerve block in the sacrum and had difficulty walking for several hours after this  She is due to get more blocks appearing she is also seeing Dr Kathrin Cooney  She continues with vaginal pain and discharge  She is here for scope of her urethral stump and vagina  Creatinine is elevated 1 62  CT scan 2022 showed no evidence of disease recurrence  No major hydronephrosis         Cystoscopy     Date/Time 2022 10:53 AM     Performed by  Becca Dejesus MD     Authorized by Becca Dejesus MD          Procedure Details:  Procedure type: cystoscopy    Additional Procedure Details: Cystoscopy Procedure Note        Pre-operative Diagnosis:  Bladder cancer status post radical cystectomy with urethra left in place, recurrent bleeding in pain and discharge from her vagina    Post-operative Diagnosis:  Normal vaginal mucosa, no lesions, normal urethral stump no recurrence    Procedure: Flexible cystoscopy    Surgeon: Darvin Jenkins MD    Anesthesia: 1% Xylocaine per urethra    EBL: Minimal    Complications: none    Procedure Details   The risks, benefits, complications, treatment options, and expected outcomes were discussed with the patient  The patient concurred with the proposed plan, giving informed consent  Cystoscopy was performed today under local anesthesia, using sterile technique  The patient was placed in the supine position, prepped with Betadine, and draped in the usual sterile fashion  The flexible cystocope was used to inspect both the urethra and bladder    Findings:  Urethra:  Urethral stump is none involved with tumor in very short  Bladder:  Status post cystectomy  Vaginoscopy carried out with the cystoscope  This revealed normal mucosa, with normal epithelial shedding-no evidence of malignancy, no active bleeding areas, no ulceration             Specimens:  None                 Complications:  None           Disposition: To home            Condition:  Stable          The following portions of the patient's history were reviewed and updated as appropriate: allergies, current medications, past family history, past medical history, past social history, past surgical history and problem list   Past Medical History:   Diagnosis Date   • Anxiety    • Arthritis of lumbar spine    • Asthma     "mild"   • Bilateral dry eyes    • Bilateral shoulder pain     with right arm pain from neck   • Bipolar disorder ([address] )    • Bladder CA in situ    • Bulging of cervical intervertebral disc     C7 and C8   • Bursitis of hip, right    • Cancer [facility])     bladder   • Carpal tunnel syndrome, bilateral    • Cataract     right   • Cervical spine arthritis    • Chronic pain disorder    • Colitis    • Cystitis, chronic    • Dental crown present    • Depression    • Deviated septum    • Diabetes mellitus ([facility] [address] )    • Family history of factor V Leiden mutation     Mom has;  pt reports " I tested negative"   • Fatty liver    • GERD (gastroesophageal reflux disease)    • Grinding teeth    • High cholesterol    • History of heart block     "from reaction to Lithium"   • History of recent fall     3 weeks ago or so fell on ice   • HPV (human papilloma virus) infection     "dormant"   • Hypertension    • Irritable bowel syndrome    • Left knee injury     "years ago"   • Nicotine dependence    • Psychiatric disorder     Panic disorder/none in a long time   • Rotator cuff tendinitis     bilat   • Seizures ([facility] [address] )     "diagnosed with tonic/clonic seizures from coming off lithium but since on lamictal no seizure x 10 years"   • Shortness of breath     mild   • Sinus infection     saw Dr Kathrin Jacques  and is taking antibiotics   • Wears glasses      Past Surgical History:   Procedure Laterality Date   • BLADDER SURGERY N/A    • CHOLECYSTECTOMY     • ESOPHAGOGASTRODUODENOSCOPY     • GYNECOLOGIC CRYOSURGERY      "for HPV"   • INDUCED       "age 25"   • INSTILLATION MYTOMYCIN N/A 2019    Procedure: INSTILLATION MITOMYCIN;  Surgeon: Dre Atkinson MD;  [facility];  Service: Urology   • [place] CYSTOURETHROSCOPY,FULGUR <0 5 CM LESN N/A 2019    Procedure: TRANSURETHRAL RESECTION OF BLADDER TUMOR (TURBT); Surgeon: Dre Atkinson MD;  [facility];  Service: Urology   • [place]   • WISDOM TOOTH EXTRACTION       shoulder  Review of Systems   Review of Systems   Genitourinary: Positive for vaginal discharge and vaginal pain          Has ileal conduit       Active Problem List     Patient Active Problem List   Diagnosis   • Simple partial seizures ([facility] [address] )   • Chronic right shoulder pain   • Rotator cuff tendonitis, right   • Right hip pain   • Trochanteric bursitis of right hip   • Rotator cuff tendonitis, left   • Chronic left shoulder pain   • Radiculopathy, cervical region   • Malignant neoplasm of urinary bladder (HCC)   • Malignant neoplasm of ureteric orifice (HCC)   • Essential hypertension   • Dyslipidemia   • Type 2 diabetes mellitus without complication, without long-term current use of insulin (HCC)   • Tobacco use   • Uncontrolled type 2 diabetes mellitus with hyperglycemia (HCC)   • Stage 3a chronic kidney disease (HCC)       Objective   /80   Pulse 96   Ht 5' 5" (1 651 m)   Wt 107 kg (235 lb)   BMI 39 11 kg/m²     Physical Exam  Vitals reviewed  Constitutional:       Appearance: Normal appearance  HENT:      Head: Normocephalic and atraumatic  Eyes:      Extraocular Movements: Extraocular movements intact  Pulmonary:      Effort: Pulmonary effort is normal    Genitourinary:     General: Normal vulva  Musculoskeletal:         General: Normal range of motion  Cervical back: Normal range of motion  Skin:     Coloration: Skin is not jaundiced or pale  Neurological:      General: No focal deficit present  Mental Status: She is alert and oriented to person, place, and time  Psychiatric:         Mood and Affect: Mood normal          Behavior: Behavior normal          Thought Content:  Thought content normal          Judgment: Judgment normal              Current Medications     Current Outpatient Medications:   •  acetaminophen (TYLENOL) 325 mg tablet, , Disp: , Rfl:   •  atenolol (TENORMIN) 25 mg tablet, 1 daily, Disp: , Rfl:   •  azelastine (ASTELIN) 0 1 % nasal spray, 1 spray into each nostril, Disp: , Rfl:   •  Cholecalciferol (VITAMIN D3 GUMMIES ADULT PO), , Disp: , Rfl:   •  clonazePAM (KlonoPIN) 0 5 mg tablet, Take 0 5 mg by mouth 3 (three) times a day, Disp: , Rfl:   •  clotrimazole (LOTRIMIN) 1 % cream, Apply topically as needed, Disp: , Rfl:   •  clotrimazole-betamethasone (LOTRISONE) 1-0 05 % cream, APPLY TO AFFECTED AREA(S) AS DIRECTED, Disp: , Rfl:   •  COLESTIPOL HCL PO, , Disp: , Rfl:   •  Dulaglutide (Trulicity) 9 60 FG/4 9RA SOPN, Inject 0 75 mg under the skin Once a week, Disp: , Rfl:   •  ferrous sulfate 325 (65 Fe) mg tablet, TAKE 1 TABLET BY MOUTH EVERY DAY WITH BREAKFAST    OTC, Disp: , Rfl:   •  fluticasone (FLONASE) 50 mcg/act nasal spray, 2 sprays into each nostril 2 (two) times a day, Disp: , Rfl:   •  glucose blood test strip, Freestyle Lite test strips  Test 8XD  , Disp: , Rfl:   •  lamoTRIgine (LaMICtal) 200 MG tablet, TAKE 1 TABLET TWICE DAILY  , Disp: 180 tablet, Rfl: 1  •  lamoTRIgine (LaMICtal) 25 mg tablet, TAKE 1 (25MG) TABLET BY MOUTH TWICE A DAY IN ADDITION TO 200MG TWICE DAILY, Disp: 180 tablet, Rfl: 3  •  medroxyPROGESTERone acetate (DEPO-PROVERA SYRINGE) 150 mg/mL injection, INJECT 150 MG INJECT INTO THE MUSCLE EVERY 3 (THREE) MONTHS , Disp: , Rfl:   •  metFORMIN (GLUCOPHAGE-XR) 500 mg 24 hr tablet, Take 1,000 mg by mouth 2 (two) times a day  , Disp: , Rfl:   •  omeprazole (PriLOSEC) 40 MG capsule, , Disp: , Rfl:   •  ondansetron (ZOFRAN-ODT) 4 mg disintegrating tablet, as needed, Disp: , Rfl:   •  POTASSIUM CITRATE-CITRIC ACID PO, , Disp: , Rfl:   •  pravastatin (PRAVACHOL) 40 mg tablet, Take 40 mg by mouth, Disp: , Rfl:   •  Tresiba FlexTouch 200 units/mL CONCENTRATED U-200 injection pen, INJECT 140 UNITS UNDER THE SKIN DAILY  , Disp: , Rfl:   •  venlafaxine (EFFEXOR-XR) 75 mg 24 hr capsule, Take 75 mg by mouth every evening, Disp: , Rfl:         Jaquan Hernandez MD

## 2022-11-18 NOTE — LETTER
2022     Cora Bran MD  [address] [place] [address]    Patient: Kade Morales   YOB: 1975   Date of Visit: 2022       Dear Dr Leopoldo Copas: Thank you for referring Alexia Logan to me for evaluation  Below are my notes for this consultation  If you have questions, please do not hesitate to call me  I look forward to following your patient along with you  Sincerely,        Mayank Oliveira MD        CC: No Recipients  Mayank Oliveira MD  2022 10:59 AM  Sign when Signing Visit  [address] [facility]  [address], [address] [place] [phone]  www  [facility]  org      NAME: Kade Morales  AGE: 52 y o  SEX: female  : 1975   MRN: [id number]    DATE: 2022  TIME: 10:57 AM    Assessment and Plan:    Bladder cancer status post radical cystectomy with complications afterwards leading to ileal conduit and reconstruction of the pelvis  Her vaginal pouch and urethral stump looked normal   No evidence recurrence of cancer  I see no signs of infection trauma or anything else  I agree with Dr Leopoldo Copas in his approach to treating this as a neuropathy with pudendal nerve blocks  The discharge I was able to evaluate by photograph she brought in this looks like a piece of fibrin from an old clot  I am not sure where the source of the bleeding was from but I can see nothing that would cause it on my exam   Follow with me in 6 months  I will make determination regarding imaging at that visit  Chief Complaint   No chief complaint on file  History of Present Illness     Bladder cancer status post cystectomy with ileal conduit as documented before, last seen by me in 2022- she has been having problems with pain in the vagina and discharge  She is here for cystoscopy of her urethral stump as well as the vagina    She underwent pudendal nerve block in the sacrum and had difficulty walking for several hours after this  She is due to get more blocks appearing she is also seeing Dr Radha Gutierrez  She continues with vaginal pain and discharge  She is here for scope of her urethral stump and vagina  Creatinine is elevated 1 62  CT scan August 2022 showed no evidence of disease recurrence  No major hydronephrosis  Cystoscopy     Date/Time 11/18/2022 10:53 AM     Performed by  Brendan Ferreira MD     Authorized by Brendan Ferreira MD          Procedure Details:  Procedure type: cystoscopy    Additional Procedure Details: Cystoscopy Procedure Note        Pre-operative Diagnosis:  Bladder cancer status post radical cystectomy with urethra left in place, recurrent bleeding in pain and discharge from her vagina    Post-operative Diagnosis:  Normal vaginal mucosa, no lesions, normal urethral stump no recurrence    Procedure: Flexible cystoscopy    Surgeon: Marcus Mcguire MD    Anesthesia: 1% Xylocaine per urethra    EBL: Minimal    Complications: none    Procedure Details   The risks, benefits, complications, treatment options, and expected outcomes were discussed with the patient  The patient concurred with the proposed plan, giving informed consent  Cystoscopy was performed today under local anesthesia, using sterile technique  The patient was placed in the supine position, prepped with Betadine, and draped in the usual sterile fashion  The flexible cystocope was used to inspect both the urethra and bladder    Findings:  Urethra:  Urethral stump is none involved with tumor in very short  Bladder:  Status post cystectomy  Vaginoscopy carried out with the cystoscope  This revealed normal mucosa, with normal epithelial shedding-no evidence of malignancy, no active bleeding areas, no ulceration             Specimens:  None                 Complications:  None           Disposition: To home            Condition:  Stable          The following portions of the patient's history were reviewed and updated as appropriate: allergies, current medications, past family history, past medical history, past social history, past surgical history and problem list   Past Medical History:   Diagnosis Date   • Anxiety    • Arthritis of lumbar spine    • Asthma     "mild"   • Bilateral dry eyes    • Bilateral shoulder pain     with right arm pain from neck   • Bipolar disorder (HCC)    • Bladder CA in situ    • Bulging of cervical intervertebral disc     C7 and C8   • Bursitis of hip, right    • Cancer (HCC)     bladder   • Carpal tunnel syndrome, bilateral    • Cataract     right   • Cervical spine arthritis    • Chronic pain disorder    • Colitis    • Cystitis, chronic    • Dental crown present    • Depression    • Deviated septum    • Diabetes mellitus ([facility] [address] )    • Family history of factor V Leiden mutation     Mom has;  pt reports " I tested negative"   • Fatty liver    • GERD (gastroesophageal reflux disease)    • Grinding teeth    • High cholesterol    • History of heart block     "from reaction to Lithium"   • History of recent fall     3 weeks ago or so fell on ice   • HPV (human papilloma virus) infection     "dormant"   • Hypertension    • Irritable bowel syndrome    • Left knee injury     "years ago"   • Nicotine dependence    • Psychiatric disorder     Panic disorder/none in a long time   • Rotator cuff tendinitis     bilat   • Seizures ([facility] [address] )     "diagnosed with tonic/clonic seizures from coming off lithium but since on lamictal no seizure x 10 years"   • Shortness of breath     mild   • Sinus infection     saw Dr Bubba London  and is taking antibiotics   • Wears glasses      Past Surgical History:   Procedure Laterality Date   • BLADDER SURGERY N/A    • CHOLECYSTECTOMY     • ESOPHAGOGASTRODUODENOSCOPY     • GYNECOLOGIC CRYOSURGERY      "for HPV"   • INDUCED       "age 25"   • INSTILLATION MYTOMYCIN N/A 2019    Procedure: INSTILLATION MITOMYCIN;  Surgeon: Luevano Delay, MD;  [facility];  Service: Urology   • [place] CYSTOURETHROSCOPY,FULGUR <0 5 CM [place] N/A 2/27/2019    Procedure: TRANSURETHRAL RESECTION OF BLADDER TUMOR (TURBT); Surgeon: Lenny Montenegro MD;  [facility];  Service: Urology   • [place]   • WISDOM TOOTH EXTRACTION       shoulder  Review of Systems   Review of Systems   Genitourinary: Positive for vaginal discharge and vaginal pain  Has ileal conduit       Active Problem List     Patient Active Problem List   Diagnosis   • Simple partial seizures ([address] )   • Chronic right shoulder pain   • Rotator cuff tendonitis, right   • Right hip pain   • Trochanteric bursitis of right hip   • Rotator cuff tendonitis, left   • Chronic left shoulder pain   • Radiculopathy, cervical region   • Malignant neoplasm of urinary bladder (HCC)   • Malignant neoplasm of ureteric orifice (HCC)   • Essential hypertension   • Dyslipidemia   • Type 2 diabetes mellitus without complication, without long-term current use of insulin (HCC)   • Tobacco use   • Uncontrolled type 2 diabetes mellitus with hyperglycemia (HCC)   • Stage 3a chronic kidney disease (HCC)       Objective   /80   Pulse 96   Ht 5' 5" (1 651 m)   Wt 107 kg (235 lb)   BMI 39 11 kg/m²     Physical Exam  Vitals reviewed  Constitutional:       Appearance: Normal appearance  HENT:      Head: Normocephalic and atraumatic  Eyes:      Extraocular Movements: Extraocular movements intact  Pulmonary:      Effort: Pulmonary effort is normal    Genitourinary:     General: Normal vulva  Musculoskeletal:         General: Normal range of motion  Cervical back: Normal range of motion  Skin:     Coloration: Skin is not jaundiced or pale  Neurological:      General: No focal deficit present  Mental Status: She is alert and oriented to person, place, and time  Psychiatric:         Mood and Affect: Mood normal          Behavior: Behavior normal          Thought Content:  Thought content normal          Judgment: Judgment normal              Current Medications     Current Outpatient Medications:   •  acetaminophen (TYLENOL) 325 mg tablet, , Disp: , Rfl:   •  atenolol (TENORMIN) 25 mg tablet, 1 daily, Disp: , Rfl:   •  azelastine (ASTELIN) 0 1 % nasal spray, 1 spray into each nostril, Disp: , Rfl:   •  Cholecalciferol (VITAMIN D3 GUMMIES ADULT PO), , Disp: , Rfl:   •  clonazePAM (KlonoPIN) 0 5 mg tablet, Take 0 5 mg by mouth 3 (three) times a day, Disp: , Rfl:   •  clotrimazole (LOTRIMIN) 1 % cream, Apply topically as needed, Disp: , Rfl:   •  clotrimazole-betamethasone (LOTRISONE) 1-0 05 % cream, APPLY TO AFFECTED AREA(S) AS DIRECTED, Disp: , Rfl:   •  COLESTIPOL HCL PO, , Disp: , Rfl:   •  Dulaglutide (Trulicity) 7 97 XF/5 3VB SOPN, Inject 0 75 mg under the skin Once a week, Disp: , Rfl:   •  ferrous sulfate 325 (65 Fe) mg tablet, TAKE 1 TABLET BY MOUTH EVERY DAY WITH BREAKFAST    OTC, Disp: , Rfl:   •  fluticasone (FLONASE) 50 mcg/act nasal spray, 2 sprays into each nostril 2 (two) times a day, Disp: , Rfl:   •  glucose blood test strip, Freestyle Lite test strips  Test 8XD  , Disp: , Rfl:   •  lamoTRIgine (LaMICtal) 200 MG tablet, TAKE 1 TABLET TWICE DAILY  , Disp: 180 tablet, Rfl: 1  •  lamoTRIgine (LaMICtal) 25 mg tablet, TAKE 1 (25MG) TABLET BY MOUTH TWICE A DAY IN ADDITION TO 200MG TWICE DAILY, Disp: 180 tablet, Rfl: 3  •  medroxyPROGESTERone acetate (DEPO-PROVERA SYRINGE) 150 mg/mL injection, INJECT 150 MG INJECT INTO THE MUSCLE EVERY 3 (THREE) MONTHS , Disp: , Rfl:   •  metFORMIN (GLUCOPHAGE-XR) 500 mg 24 hr tablet, Take 1,000 mg by mouth 2 (two) times a day  , Disp: , Rfl:   •  omeprazole (PriLOSEC) 40 MG capsule, , Disp: , Rfl:   •  ondansetron (ZOFRAN-ODT) 4 mg disintegrating tablet, as needed, Disp: , Rfl:   •  POTASSIUM CITRATE-CITRIC ACID PO, , Disp: , Rfl:   •  pravastatin (PRAVACHOL) 40 mg tablet, Take 40 mg by mouth, Disp: , Rfl:   •  Tresiba FlexTouch 200 units/mL CONCENTRATED U-200 injection pen, INJECT 140 UNITS UNDER THE SKIN DAILY  , Disp: , Rfl:   •  venlafaxine (EFFEXOR-XR) 75 mg 24 hr capsule, Take 75 mg by mouth every evening, Disp: , Rfl:         Audelia Millan MD

## 2022-11-18 NOTE — LETTER
2022     Tory Page, [place] [address] [facility] [address]  [address] [facility] [address]    Patient: Lena Carrasco   YOB: 1975   Date of Visit: 2022       Dear Dr Rafat Cameron: Thank you for referring Dayanara Bauer to me for evaluation  Below are my notes for this consultation  If you have questions, please do not hesitate to call me  I look forward to following your patient along with you  Sincerely,        Lori Galvan MD        CC: No Recipients  Lori Galvan MD  2022 10:59 AM  Sign when Signing Visit  [address] [facility]  [address], [address] [place] [phone]  www  [facility]  org      NAME: Lena Carrasco  AGE: 52 y o  SEX: female  : 1975   MRN: [id number]    DATE: 2022  TIME: 10:57 AM    Assessment and Plan:    Bladder cancer status post radical cystectomy with complications afterwards leading to ileal conduit and reconstruction of the pelvis  Her vaginal pouch and urethral stump looked normal   No evidence recurrence of cancer  I see no signs of infection trauma or anything else  I agree with Dr Shelagh Primrose in his approach to treating this as a neuropathy with pudendal nerve blocks  The discharge I was able to evaluate by photograph she brought in this looks like a piece of fibrin from an old clot  I am not sure where the source of the bleeding was from but I can see nothing that would cause it on my exam   Follow with me in 6 months  I will make determination regarding imaging at that visit  Chief Complaint   No chief complaint on file  History of Present Illness     Bladder cancer status post cystectomy with ileal conduit as documented before, last seen by me in 2022- she has been having problems with pain in the vagina and discharge  She is here for cystoscopy of her urethral stump as well as the vagina    She underwent pudendal nerve block in the sacrum and had difficulty walking for several hours after this  She is due to get more blocks appearing she is also seeing Dr Anaid Tate  She continues with vaginal pain and discharge  She is here for scope of her urethral stump and vagina  Creatinine is elevated 1 62  CT scan August 2022 showed no evidence of disease recurrence  No major hydronephrosis  Cystoscopy     Date/Time 11/18/2022 10:53 AM     Performed by  Cortez Mayfield MD     Authorized by Cortez Mayfield MD          Procedure Details:  Procedure type: cystoscopy    Additional Procedure Details: Cystoscopy Procedure Note        Pre-operative Diagnosis:  Bladder cancer status post radical cystectomy with urethra left in place, recurrent bleeding in pain and discharge from her vagina    Post-operative Diagnosis:  Normal vaginal mucosa, no lesions, normal urethral stump no recurrence    Procedure: Flexible cystoscopy    Surgeon: Jay Parada MD    Anesthesia: 1% Xylocaine per urethra    EBL: Minimal    Complications: none    Procedure Details   The risks, benefits, complications, treatment options, and expected outcomes were discussed with the patient  The patient concurred with the proposed plan, giving informed consent  Cystoscopy was performed today under local anesthesia, using sterile technique  The patient was placed in the supine position, prepped with Betadine, and draped in the usual sterile fashion  The flexible cystocope was used to inspect both the urethra and bladder    Findings:  Urethra:  Urethral stump is none involved with tumor in very short  Bladder:  Status post cystectomy  Vaginoscopy carried out with the cystoscope  This revealed normal mucosa, with normal epithelial shedding-no evidence of malignancy, no active bleeding areas, no ulceration             Specimens:  None                 Complications:  None           Disposition: To home            Condition:  Stable          The following portions of the patient's history were reviewed and updated as appropriate: allergies, current medications, past family history, past medical history, past social history, past surgical history and problem list   Past Medical History:   Diagnosis Date   • Anxiety    • Arthritis of lumbar spine    • Asthma     "mild"   • Bilateral dry eyes    • Bilateral shoulder pain     with right arm pain from neck   • Bipolar disorder (HCC)    • Bladder CA in situ    • Bulging of cervical intervertebral disc     C7 and C8   • Bursitis of hip, right    • Cancer (HCC)     bladder   • Carpal tunnel syndrome, bilateral    • Cataract     right   • Cervical spine arthritis    • Chronic pain disorder    • Colitis    • Cystitis, chronic    • Dental crown present    • Depression    • Deviated septum    • Diabetes mellitus ([facility] [address] )    • Family history of factor V Leiden mutation     Mom has;  pt reports " I tested negative"   • Fatty liver    • GERD (gastroesophageal reflux disease)    • Grinding teeth    • High cholesterol    • History of heart block     "from reaction to Lithium"   • History of recent fall     3 weeks ago or so fell on ice   • HPV (human papilloma virus) infection     "dormant"   • Hypertension    • Irritable bowel syndrome    • Left knee injury     "years ago"   • Nicotine dependence    • Psychiatric disorder     Panic disorder/none in a long time   • Rotator cuff tendinitis     bilat   • Seizures ([facility] [address] )     "diagnosed with tonic/clonic seizures from coming off lithium but since on lamictal no seizure x 10 years"   • Shortness of breath     mild   • Sinus infection     saw Dr Antonia Harris  and is taking antibiotics   • Wears glasses      Past Surgical History:   Procedure Laterality Date   • BLADDER SURGERY N/A    • CHOLECYSTECTOMY     • ESOPHAGOGASTRODUODENOSCOPY     • GYNECOLOGIC CRYOSURGERY      "for HPV"   • INDUCED       "age 25"   • INSTILLATION MYTOMYCIN N/A 2019    Procedure: INSTILLATION MITOMYCIN;  Surgeon: Jaquan Hernandez MD;  [facility];  Service: Urology   • LA CYSTOURETHROSCOPY,FULGUR <0 5 CM DILLON N/A 2/27/2019    Procedure: TRANSURETHRAL RESECTION OF BLADDER TUMOR (TURBT); Surgeon: Erin Grant MD;  [facility];  Service: Urology   • [place]   • WISDOM TOOTH EXTRACTION       shoulder  Review of Systems   Review of Systems   Genitourinary: Positive for vaginal discharge and vaginal pain  Has ileal conduit       Active Problem List     Patient Active Problem List   Diagnosis   • Simple partial seizures ([address] )   • Chronic right shoulder pain   • Rotator cuff tendonitis, right   • Right hip pain   • Trochanteric bursitis of right hip   • Rotator cuff tendonitis, left   • Chronic left shoulder pain   • Radiculopathy, cervical region   • Malignant neoplasm of urinary bladder (HCC)   • Malignant neoplasm of ureteric orifice (HCC)   • Essential hypertension   • Dyslipidemia   • Type 2 diabetes mellitus without complication, without long-term current use of insulin (HCC)   • Tobacco use   • Uncontrolled type 2 diabetes mellitus with hyperglycemia (HCC)   • Stage 3a chronic kidney disease (HCC)       Objective   /80   Pulse 96   Ht 5' 5" (1 651 m)   Wt 107 kg (235 lb)   BMI 39 11 kg/m²     Physical Exam  Vitals reviewed  Constitutional:       Appearance: Normal appearance  HENT:      Head: Normocephalic and atraumatic  Eyes:      Extraocular Movements: Extraocular movements intact  Pulmonary:      Effort: Pulmonary effort is normal    Genitourinary:     General: Normal vulva  Musculoskeletal:         General: Normal range of motion  Cervical back: Normal range of motion  Skin:     Coloration: Skin is not jaundiced or pale  Neurological:      General: No focal deficit present  Mental Status: She is alert and oriented to person, place, and time  Psychiatric:         Mood and Affect: Mood normal          Behavior: Behavior normal          Thought Content:  Thought content normal          Judgment: Judgment normal              Current Medications     Current Outpatient Medications:   •  acetaminophen (TYLENOL) 325 mg tablet, , Disp: , Rfl:   •  atenolol (TENORMIN) 25 mg tablet, 1 daily, Disp: , Rfl:   •  azelastine (ASTELIN) 0 1 % nasal spray, 1 spray into each nostril, Disp: , Rfl:   •  Cholecalciferol (VITAMIN D3 GUMMIES ADULT PO), , Disp: , Rfl:   •  clonazePAM (KlonoPIN) 0 5 mg tablet, Take 0 5 mg by mouth 3 (three) times a day, Disp: , Rfl:   •  clotrimazole (LOTRIMIN) 1 % cream, Apply topically as needed, Disp: , Rfl:   •  clotrimazole-betamethasone (LOTRISONE) 1-0 05 % cream, APPLY TO AFFECTED AREA(S) AS DIRECTED, Disp: , Rfl:   •  COLESTIPOL HCL PO, , Disp: , Rfl:   •  Dulaglutide (Trulicity) 6 27 BE/1 4LC SOPN, Inject 0 75 mg under the skin Once a week, Disp: , Rfl:   •  ferrous sulfate 325 (65 Fe) mg tablet, TAKE 1 TABLET BY MOUTH EVERY DAY WITH BREAKFAST    OTC, Disp: , Rfl:   •  fluticasone (FLONASE) 50 mcg/act nasal spray, 2 sprays into each nostril 2 (two) times a day, Disp: , Rfl:   •  glucose blood test strip, Freestyle Lite test strips  Test 8XD  , Disp: , Rfl:   •  lamoTRIgine (LaMICtal) 200 MG tablet, TAKE 1 TABLET TWICE DAILY  , Disp: 180 tablet, Rfl: 1  •  lamoTRIgine (LaMICtal) 25 mg tablet, TAKE 1 (25MG) TABLET BY MOUTH TWICE A DAY IN ADDITION TO 200MG TWICE DAILY, Disp: 180 tablet, Rfl: 3  •  medroxyPROGESTERone acetate (DEPO-PROVERA SYRINGE) 150 mg/mL injection, INJECT 150 MG INJECT INTO THE MUSCLE EVERY 3 (THREE) MONTHS , Disp: , Rfl:   •  metFORMIN (GLUCOPHAGE-XR) 500 mg 24 hr tablet, Take 1,000 mg by mouth 2 (two) times a day  , Disp: , Rfl:   •  omeprazole (PriLOSEC) 40 MG capsule, , Disp: , Rfl:   •  ondansetron (ZOFRAN-ODT) 4 mg disintegrating tablet, as needed, Disp: , Rfl:   •  POTASSIUM CITRATE-CITRIC ACID PO, , Disp: , Rfl:   •  pravastatin (PRAVACHOL) 40 mg tablet, Take 40 mg by mouth, Disp: , Rfl:   •  Tresiba FlexTouch 200 units/mL CONCENTRATED U-200 injection pen, INJECT 140 UNITS UNDER THE SKIN DAILY  , Disp: , Rfl:   •  venlafaxine (EFFEXOR-XR) 75 mg 24 hr capsule, Take 75 mg by mouth every evening, Disp: , Rfl:         Birdie Martin MD

## 2023-02-10 ENCOUNTER — TELEPHONE (OUTPATIENT)
Dept: UROLOGY | Facility: MEDICAL CENTER | Age: 48
End: 2023-02-10

## 2023-02-10 NOTE — TELEPHONE ENCOUNTER
Pt called the office stated that she was told at her last OV with Dr Cabezas to follow up for bladder cancer but pt is not sure when and nothing is stated in the OV note  [facility] does not deliver to her anymore and she wants the supplies send to [facility]  Please advise

## 2023-02-10 NOTE — TELEPHONE ENCOUNTER
Patient scheduled for 6 mo FU in May w/Dr Tiffany Lewis;  patient questioning when MRI needs to be done  Please call and advise

## 2023-02-10 NOTE — TELEPHONE ENCOUNTER
Called and spoke with Nickie Jenkins  Advised Dr Jonathan Rangel will decide on necessary imaging at follow up in May per his last office visit note- does not need MRI prior to visit  Spoke with patient regarding list of urostomy supplies she needs and obtained reference numbers  Will speak with medical supply companies and place order for supplies  Patient aware I will call her back after order is placed

## 2023-02-14 NOTE — TELEPHONE ENCOUNTER
Called and spoke with Angie Aguayo at [address] was provided with patients information and [employer] numbers and quantities of patients supplies  [facility] will contact patient to review order, then send script to the [facility] office for Dr Sharon Ghotra to sign  Contacted Kaylin and left voicemail regarding same  Advised I will contact her after script is received, signed, and faxed to [facility]

## 2023-02-16 NOTE — TELEPHONE ENCOUNTER
Spoke with [facility] to check on status of script  They have been trying to contact patient to confirm script  [facility] states they will have script faxed to [phone] to be signed

## 2023-02-17 NOTE — TELEPHONE ENCOUNTER
Voicemail message left for Luis Zamudio to contact [facility] to confirm order so script can be faxed and signed in order for her to receive urostomy supplies

## 2023-05-02 RX ORDER — POTASSIUM CITRATE 10 MEQ/1
TABLET, EXTENDED RELEASE ORAL
COMMUNITY
Start: 2023-03-14

## 2023-05-02 RX ORDER — LOSARTAN POTASSIUM 25 MG/1
TABLET ORAL
COMMUNITY
Start: 2023-01-27

## 2023-05-02 RX ORDER — OXYCODONE HYDROCHLORIDE 5 MG/1
5 TABLET ORAL
COMMUNITY
Start: 2023-03-20

## 2023-05-02 RX ORDER — DICYCLOMINE HCL 20 MG
20 TABLET ORAL 4 TIMES DAILY
COMMUNITY
Start: 2023-02-14 | End: 2023-05-12 | Stop reason: ALTCHOICE

## 2023-05-02 RX ORDER — FAMOTIDINE 40 MG/1
40 TABLET, FILM COATED ORAL
COMMUNITY
Start: 2023-03-15 | End: 2023-05-12 | Stop reason: ALTCHOICE

## 2023-05-12 ENCOUNTER — OFFICE VISIT (OUTPATIENT)
Dept: UROLOGY | Facility: CLINIC | Age: 48
End: 2023-05-12

## 2023-05-12 VITALS
WEIGHT: 236 LBS | OXYGEN SATURATION: 100 % | HEART RATE: 88 BPM | HEIGHT: 65 IN | DIASTOLIC BLOOD PRESSURE: 80 MMHG | SYSTOLIC BLOOD PRESSURE: 130 MMHG | BODY MASS INDEX: 39.32 KG/M2

## 2023-05-12 DIAGNOSIS — Z93.6 S/P ILEAL CONDUIT (HCC): ICD-10-CM

## 2023-05-12 DIAGNOSIS — N18.31 STAGE 3A CHRONIC KIDNEY DISEASE (HCC): ICD-10-CM

## 2023-05-12 DIAGNOSIS — E66.01 OBESITY, MORBID (HCC): ICD-10-CM

## 2023-05-12 DIAGNOSIS — E11.65 UNCONTROLLED TYPE 2 DIABETES MELLITUS WITH HYPERGLYCEMIA (HCC): ICD-10-CM

## 2023-05-12 DIAGNOSIS — C67.9 MALIGNANT NEOPLASM OF URINARY BLADDER, UNSPECIFIED SITE (HCC): Primary | ICD-10-CM

## 2023-05-12 NOTE — LETTER
May 12, 2023     Maycol Diana, [place] [address] [address]    Patient: Janee Ace   YOB: 1975   Date of Visit: 2023       Dear Dr Grady Bailey: Thank you for referring Iris Carrasquillo to me for evaluation  Below are my notes for this consultation  If you have questions, please do not hesitate to call me  I look forward to following your patient along with you  Sincerely,        Jose R Knott MD        CC: MD Jose R Garner MD  2023  3:32 PM  Sign when Signing Visit  [address] [facility]  [place] [phone]  www  [facility]  org      NAME: Janee Ace  AGE: 52 y o  SEX: female  : 1975   MRN: [id number]    DATE: 2023  TIME: 3:20 PM    Assessment and Plan:    4 years status post radical cystectomy with neoadjuvant chemotherapy with uterus and ovaries left intact  She continues to have vaginal discharge and it sounds to me like this may be from normal menopause which is not uncommon given that she has an intact uterus and she is experiencing hot flashes  She has pelvic pain being managed with sacral pudendal nerve blocks which have been successful  Have a CT scan done and I will send her the results  Otherwise follow-up 1 year  Chief Complaint     Chief Complaint   Patient presents with   • Bladder Cancer       History of Present Illness   Bladder cancer status post radical cystectomy 4854 with complications afterwards leading to an ileal conduit and reconstruction of the pelvis with neoadjuvant chemotherapy   it must be noted that she still has her uterus and both ovaries  I performed vaginoscopy and urethral stump visualization with the cystoscope 2022  All of these look normal   I saw no signs of infection, trauma or anything else    Dr Alice Narayanan has been seeing her to treat this "as a neuropathy with pudendal nerve blocks  Last upper tract imaging was a CT scan of the abdomen and pelvis August 27, 2022 which showed right lower quadrant ileal conduit and previous cystectomy, no evidence of calculi, otherwise looked okay  She has been undergoing a lot of nerve root injections  Serum creatinine was 1 69 April 1, 2023  The nerve blocks are taking place in the sacral region  The injections are helping with the pain  \"I am getting better\"  She is describing possible uterine prolapse  She continues to have discharge from the vagina  To me it sounds like she is having expected bleeding during menopause, not unusual in women and treated with D/C        The following portions of the patient's history were reviewed and updated as appropriate: allergies, current medications, past family history, past medical history, past social history, past surgical history and problem list   Past Medical History:   Diagnosis Date   • Anxiety    • Arthritis of lumbar spine    • Asthma     \"mild\"   • Bilateral dry eyes    • Bilateral shoulder pain     with right arm pain from neck   • Bipolar disorder ([facility] [address] )    • Bladder CA in situ    • Bulging of cervical intervertebral disc     C7 and C8   • Bursitis of hip, right    • Cancer ([facility] [address] )     bladder   • Carpal tunnel syndrome, bilateral    • Cataract     right   • Cervical spine arthritis    • Chronic pain disorder    • Colitis    • Cystitis, chronic    • Dental crown present    • Depression    • Deviated septum    • Diabetes mellitus ([facility] [address] )    • Family history of factor V Leiden mutation     Mom has;  pt reports \" I tested negative\"   • Fatty liver    • GERD (gastroesophageal reflux disease)    • Grinding teeth    • High cholesterol    • History of heart block     \"from reaction to Lithium\"   • History of recent fall     3 weeks ago or so fell on ice   • HPV (human papilloma virus) infection     \"dormant\"   • Hypertension    • Irritable bowel syndrome    • Left knee injury  " "   \"years ago\"   • Nicotine dependence    • Psychiatric disorder     Panic disorder/none in a long time   • Rotator cuff tendinitis     bilat   • Seizures ([facility] [address] )     \"diagnosed with tonic/clonic seizures from coming off lithium but since on lamictal no seizure x 10 years\"   • Shortness of breath     mild   • Sinus infection     saw Dr Jyotsna Delgado  and is taking antibiotics   • Wears glasses      Past Surgical History:   Procedure Laterality Date   • BLADDER SURGERY N/A    • CHOLECYSTECTOMY     • ESOPHAGOGASTRODUODENOSCOPY     • GYNECOLOGIC CRYOSURGERY      \"for HPV\"   • INDUCED       \"age 18\"   • INSTILLATION MYTOMYCIN N/A 2019    Procedure: INSTILLATION MITOMYCIN;  Surgeon: Karen Rosenthal MD;  [facility];  Service: Urology   • FL CYSTO W/REMOVAL OF LESIONS SMALL N/A 2019    Procedure: TRANSURETHRAL RESECTION OF BLADDER TUMOR (TURBT);   Surgeon: Karen Rosenthal MD;  [facility];  Service: Urology   • [place]   • WISDOM TOOTH EXTRACTION       shoulder  Review of Systems   Review of Systems    Active Problem List     Patient Active Problem List   Diagnosis   • Simple partial seizures [facility])   • Chronic right shoulder pain   • Rotator cuff tendonitis, right   • Right hip pain   • Trochanteric bursitis of right hip   • Rotator cuff tendonitis, left   • Chronic left shoulder pain   • Radiculopathy, cervical region   • Malignant neoplasm of urinary bladder ([facility] [address] )   • Malignant neoplasm of ureteric orifice ([facility] [address] )   • Essential hypertension   • Dyslipidemia   • Type 2 diabetes mellitus without complication, without long-term current use of insulin (HCC)   • Tobacco use   • Uncontrolled type 2 diabetes mellitus with hyperglycemia (HCC)   • Stage 3a chronic kidney disease (HCC)       Objective   /80 (BP Location: Left arm, Patient Position: Sitting, Cuff Size: Large)   Pulse 88   Ht 5' 5\" (1 651 m)   Wt 107 kg (236 lb)   SpO2 100%   BMI 39 27 kg/m²     Physical " Exam  Vitals reviewed  Constitutional:       Appearance: Normal appearance  HENT:      Head: Normocephalic and atraumatic  Eyes:      Extraocular Movements: Extraocular movements intact  Pulmonary:      Effort: Pulmonary effort is normal    Musculoskeletal:         General: Normal range of motion  Cervical back: Normal range of motion  Skin:     Coloration: Skin is not jaundiced or pale  Neurological:      General: No focal deficit present  Mental Status: She is alert and oriented to person, place, and time  Mental status is at baseline  Psychiatric:         Mood and Affect: Mood normal          Behavior: Behavior normal          Thought Content: Thought content normal          Judgment: Judgment normal              Current Medications     Current Outpatient Medications:   •  atenolol (TENORMIN) 25 mg tablet, 1 daily, Disp: , Rfl:   •  azelastine (ASTELIN) 0 1 % nasal spray, 1 spray into each nostril, Disp: , Rfl:   •  Cholecalciferol (VITAMIN D3 GUMMIES ADULT PO), , Disp: , Rfl:   •  clonazePAM (KlonoPIN) 0 5 mg tablet, Take 0 5 mg by mouth 3 (three) times a day, Disp: , Rfl:   •  COLESTIPOL HCL PO, , Disp: , Rfl:   •  Dulaglutide (Trulicity) 4 16 BG/4 1ZI SOPN, Inject 0 75 mg under the skin Once a week, Disp: , Rfl:   •  ferrous sulfate 325 (65 Fe) mg tablet, TAKE 1 TABLET BY MOUTH EVERY DAY WITH BREAKFAST    OTC, Disp: , Rfl:   •  fluticasone (FLONASE) 50 mcg/act nasal spray, 2 sprays into each nostril 2 (two) times a day, Disp: , Rfl:   •  lamoTRIgine (LaMICtal) 25 mg tablet, TAKE 1 (25MG) TABLET BY MOUTH TWICE A DAY IN ADDITION TO 200MG TWICE DAILY, Disp: 180 tablet, Rfl: 3  •  losartan (COZAAR) 25 mg tablet, TAKE 1 TABLET BY MOUTH 1 TIME EACH DAY , Disp: , Rfl:   •  medroxyPROGESTERone acetate (DEPO-PROVERA SYRINGE) 150 mg/mL injection, INJECT 150 MG INJECT INTO THE MUSCLE EVERY 3 (THREE) MONTHS , Disp: , Rfl:   •  metFORMIN (GLUCOPHAGE-XR) 500 mg 24 hr tablet, Take 1,000 mg by mouth 2 (two) times a day  , Disp: , Rfl:   •  omeprazole (PriLOSEC) 40 MG capsule, , Disp: , Rfl:   •  oxyCODONE (ROXICODONE) 5 immediate release tablet, Take 5 mg by mouth, Disp: , Rfl:   •  potassium citrate (UROCIT-K) 10 mEq, PLEASE SEE ATTACHED FOR DETAILED DIRECTIONS, Disp: , Rfl:   •  pravastatin (PRAVACHOL) 40 mg tablet, Take 40 mg by mouth, Disp: , Rfl:   •  Tresiba FlexTouch 200 units/mL CONCENTRATED U-200 injection pen, INJECT 140 UNITS UNDER THE SKIN DAILY  , Disp: , Rfl:   •  venlafaxine (EFFEXOR-XR) 75 mg 24 hr capsule, Take 75 mg by mouth every evening, Disp: , Rfl:   •  clotrimazole (LOTRIMIN) 1 % cream, Apply topically as needed (Patient not taking: Reported on 5/12/2023), Disp: , Rfl:   •  clotrimazole-betamethasone (LOTRISONE) 1-0 05 % cream, APPLY TO AFFECTED AREA(S) AS DIRECTED (Patient not taking: Reported on 5/12/2023), Disp: , Rfl:   •  Continuous Blood Gluc  (FreeStyle Laureen 2 [place]) EDSON, 1 EACH BY MISCELLANEOUS ROUTE ONE TIME FOR 1 DOSE  (Patient not taking: Reported on 5/12/2023), Disp: , Rfl:   •  Continuous Blood Gluc Sensor (FreeStyle Laureen 2 Sensor) MISC, 1 EACH BY MISCELLANEOUS ROUTE EVERY 14 (FOURTEEN) DAYS  CHANGE EVERY 14 DAYS  E11 65 (Patient not taking: Reported on 5/12/2023), Disp: , Rfl:   •  glucose blood test strip, Freestyle Lite test strips  Test 8XD  (Patient not taking: Reported on 5/12/2023), Disp: , Rfl:   •  lamoTRIgine (LaMICtal) 200 MG tablet, TAKE 1 TABLET TWICE DAILY   (Patient not taking: Reported on 5/12/2023), Disp: 180 tablet, Rfl: 1  •  ondansetron (ZOFRAN-ODT) 4 mg disintegrating tablet, as needed (Patient not taking: Reported on 5/12/2023), Disp: , Rfl:         Durga Severino MD

## 2023-05-12 NOTE — PROGRESS NOTES
"[address] [facility]  [facility]  [address] [facility]  Raymond Shields, [address] [place] [address]  [phone]  www  [facility]  org      NAME: Joseph Sánchez  AGE: 52 y o  SEX: female  : 1975   MRN: [id number]    DATE: 2023  TIME: 3:20 PM    Assessment and Plan:    4 years status post radical cystectomy with neoadjuvant chemotherapy with uterus and ovaries left intact  She continues to have vaginal discharge and it sounds to me like this may be from normal menopause which is not uncommon given that she has an intact uterus and she is experiencing hot flashes  She has pelvic pain being managed with sacral pudendal nerve blocks which have been successful  Have a CT scan done and I will send her the results  Otherwise follow-up 1 year  Chief Complaint     Chief Complaint   Patient presents with   • Bladder Cancer       History of Present Illness   Bladder cancer status post radical cystectomy 2 with complications afterwards leading to an ileal conduit and reconstruction of the pelvis with neoadjuvant chemotherapy   it must be noted that she still has her uterus and both ovaries  I performed vaginoscopy and urethral stump visualization with the cystoscope 2022  All of these look normal   I saw no signs of infection, trauma or anything else  Dr Annalise Vail has been seeing her to treat this as a neuropathy with pudendal nerve blocks  Last upper tract imaging was a CT scan of the abdomen and pelvis 2022 which showed right lower quadrant ileal conduit and previous cystectomy, no evidence of calculi, otherwise looked okay  She has been undergoing a lot of nerve root injections  Serum creatinine was 1 69 2023  The nerve blocks are taking place in the sacral region  The injections are helping with the pain  \"I am getting better\"  She is describing possible uterine prolapse  She continues to have discharge from the vagina  To me " "it sounds like she is having expected bleeding during menopause, not unusual in women and treated with D/C        The following portions of the patient's history were reviewed and updated as appropriate: allergies, current medications, past family history, past medical history, past social history, past surgical history and problem list   Past Medical History:   Diagnosis Date   • Anxiety    • Arthritis of lumbar spine    • Asthma     \"mild\"   • Bilateral dry eyes    • Bilateral shoulder pain     with right arm pain from neck   • Bipolar disorder (HCC)    • Bladder CA in situ    • Bulging of cervical intervertebral disc     C7 and C8   • Bursitis of hip, right    • Cancer (HCC)     bladder   • Carpal tunnel syndrome, bilateral    • Cataract     right   • Cervical spine arthritis    • Chronic pain disorder    • Colitis    • Cystitis, chronic    • Dental crown present    • Depression    • Deviated septum    • Diabetes mellitus ([email or web address] [facility] )    • Family history of factor V Leiden mutation     Mom has;  pt reports \" I tested negative\"   • Fatty liver    • GERD (gastroesophageal reflux disease)    • Grinding teeth    • High cholesterol    • History of heart block     \"from reaction to Lithium\"   • History of recent fall     3 weeks ago or so fell on ice   • HPV (human papilloma virus) infection     \"dormant\"   • Hypertension    • Irritable bowel syndrome    • Left knee injury     \"years ago\"   • Nicotine dependence    • Psychiatric disorder     Panic disorder/none in a long time   • Rotator cuff tendinitis     bilat   • Seizures ([facility] [address] )     \"diagnosed with tonic/clonic seizures from coming off lithium but since on lamictal no seizure x 10 years\"   • Shortness of breath     mild   • Sinus infection     saw Dr Otto Hassan 2/18 and is taking antibiotics   • Wears glasses      Past Surgical History:   Procedure Laterality Date   • BLADDER SURGERY N/A    • CHOLECYSTECTOMY     • ESOPHAGOGASTRODUODENOSCOPY     • GYNECOLOGIC CRYOSURGERY      " "\"for HPV\"   • INDUCED       \"age 18\"   • INSTILLATION MYTOMYCIN N/A 2019    Procedure: INSTILLATION MITOMYCIN;  Surgeon: Madeleine Du MD;  Location: [facility];  Service: Urology   • MS CYSTO W/REMOVAL OF LESIONS SMALL N/A 2019    Procedure: TRANSURETHRAL RESECTION OF BLADDER TUMOR (TURBT); Surgeon: Madeleine Du MD;  Location: [facility];  Service: Urology   • [place]   • WISDOM TOOTH EXTRACTION       shoulder  Review of Systems   Review of Systems    Active Problem List     Patient Active Problem List   Diagnosis   • Simple partial seizures [facility])   • Chronic right shoulder pain   • Rotator cuff tendonitis, right   • Right hip pain   • Trochanteric bursitis of right hip   • Rotator cuff tendonitis, left   • Chronic left shoulder pain   • Radiculopathy, cervical region   • Malignant neoplasm of urinary bladder ([address] )   • Malignant neoplasm of ureteric orifice ([facility] [address] )   • Essential hypertension   • Dyslipidemia   • Type 2 diabetes mellitus without complication, without long-term current use of insulin (HCC)   • Tobacco use   • Uncontrolled type 2 diabetes mellitus with hyperglycemia (HCC)   • Stage 3a chronic kidney disease (HCC)       Objective   /80 (BP Location: Left arm, Patient Position: Sitting, Cuff Size: Large)   Pulse 88   Ht 5' 5\" (1 651 m)   Wt 107 kg (236 lb)   SpO2 100%   BMI 39 27 kg/m²     Physical Exam  Vitals reviewed  Constitutional:       Appearance: Normal appearance  HENT:      Head: Normocephalic and atraumatic  Eyes:      Extraocular Movements: Extraocular movements intact  Pulmonary:      Effort: Pulmonary effort is normal    Musculoskeletal:         General: Normal range of motion  Cervical back: Normal range of motion  Skin:     Coloration: Skin is not jaundiced or pale  Neurological:      General: No focal deficit present  Mental Status: She is alert and oriented to person, place, and time  Mental status is at baseline   " Psychiatric:         Mood and Affect: Mood normal          Behavior: Behavior normal          Thought Content: Thought content normal          Judgment: Judgment normal              Current Medications     Current Outpatient Medications:   •  atenolol (TENORMIN) 25 mg tablet, 1 daily, Disp: , Rfl:   •  azelastine (ASTELIN) 0 1 % nasal spray, 1 spray into each nostril, Disp: , Rfl:   •  Cholecalciferol (VITAMIN D3 GUMMIES ADULT PO), , Disp: , Rfl:   •  clonazePAM (KlonoPIN) 0 5 mg tablet, Take 0 5 mg by mouth 3 (three) times a day, Disp: , Rfl:   •  COLESTIPOL HCL PO, , Disp: , Rfl:   •  Dulaglutide (Trulicity) 3 10 OH/3 5GC SOPN, Inject 0 75 mg under the skin Once a week, Disp: , Rfl:   •  ferrous sulfate 325 (65 Fe) mg tablet, TAKE 1 TABLET BY MOUTH EVERY DAY WITH BREAKFAST    OTC, Disp: , Rfl:   •  fluticasone (FLONASE) 50 mcg/act nasal spray, 2 sprays into each nostril 2 (two) times a day, Disp: , Rfl:   •  lamoTRIgine (LaMICtal) 25 mg tablet, TAKE 1 (25MG) TABLET BY MOUTH TWICE A DAY IN ADDITION TO 200MG TWICE DAILY, Disp: 180 tablet, Rfl: 3  •  losartan (COZAAR) 25 mg tablet, TAKE 1 TABLET BY MOUTH 1 TIME EACH DAY , Disp: , Rfl:   •  medroxyPROGESTERone acetate (DEPO-PROVERA SYRINGE) 150 mg/mL injection, INJECT 150 MG INJECT INTO THE MUSCLE EVERY 3 (THREE) MONTHS , Disp: , Rfl:   •  metFORMIN (GLUCOPHAGE-XR) 500 mg 24 hr tablet, Take 1,000 mg by mouth 2 (two) times a day  , Disp: , Rfl:   •  omeprazole (PriLOSEC) 40 MG capsule, , Disp: , Rfl:   •  oxyCODONE (ROXICODONE) 5 immediate release tablet, Take 5 mg by mouth, Disp: , Rfl:   •  potassium citrate (UROCIT-K) 10 mEq, PLEASE SEE ATTACHED FOR DETAILED DIRECTIONS, Disp: , Rfl:   •  pravastatin (PRAVACHOL) 40 mg tablet, Take 40 mg by mouth, Disp: , Rfl:   •  Tresiba FlexTouch 200 units/mL CONCENTRATED U-200 injection pen, INJECT 140 UNITS UNDER THE SKIN DAILY  , Disp: , Rfl:   •  venlafaxine (EFFEXOR-XR) 75 mg 24 hr capsule, Take 75 mg by mouth every evening, Disp: , Rfl:   •  clotrimazole (LOTRIMIN) 1 % cream, Apply topically as needed (Patient not taking: Reported on 5/12/2023), Disp: , Rfl:   •  clotrimazole-betamethasone (LOTRISONE) 1-0 05 % cream, APPLY TO AFFECTED AREA(S) AS DIRECTED (Patient not taking: Reported on 5/12/2023), Disp: , Rfl:   •  Continuous Blood Gluc  (FreeStyle Laureen 2 Cummings) EDSON, 1 EACH BY MISCELLANEOUS ROUTE ONE TIME FOR 1 DOSE  (Patient not taking: Reported on 5/12/2023), Disp: , Rfl:   •  Continuous Blood Gluc Sensor (FreeStyle Laureen 2 Sensor) MISC, 1 EACH BY MISCELLANEOUS ROUTE EVERY 14 (FOURTEEN) DAYS  CHANGE EVERY 14 DAYS  E11 65 (Patient not taking: Reported on 5/12/2023), Disp: , Rfl:   •  glucose blood test strip, Freestyle Lite test strips  Test 8XD  (Patient not taking: Reported on 5/12/2023), Disp: , Rfl:   •  lamoTRIgine (LaMICtal) 200 MG tablet, TAKE 1 TABLET TWICE DAILY   (Patient not taking: Reported on 5/12/2023), Disp: 180 tablet, Rfl: 1  •  ondansetron (ZOFRAN-ODT) 4 mg disintegrating tablet, as needed (Patient not taking: Reported on 5/12/2023), Disp: , Rfl:         Juan C Du MD

## 2023-05-15 ENCOUNTER — TELEPHONE (OUTPATIENT)
Dept: UROLOGY | Facility: MEDICAL CENTER | Age: 48
End: 2023-05-15

## 2023-05-15 NOTE — TELEPHONE ENCOUNTER
Patient of Dr Lachelle Segovia in [facility] called stating patient is scheduled for 05/23/23 for CT Scan abdomen/pelvis  It needs authorization  Patient has [employer]         [facility]   NPI [id number]  [address] [facility] code [id number]   Dx code Q60 [id number]    Authorization by 05/22/23  Fax it to [phone]    Please Coryn [phone]

## 2023-05-16 NOTE — TELEPHONE ENCOUNTER
Auth#: [id number] valid from 5/23/23 until 8/21/23 was APPROVED for CT Scan Abd/Pelvis without contrast (26576) for DOS 5/23/23 @ [address] for [facility] notified of same  No further action required

## 2023-05-23 NOTE — TELEPHONE ENCOUNTER
Could we fax the order for CT Abd/Pelvis w/o contrast to [facility] at [phone] asap   Patient has an appointment today at 1pm

## 2023-08-30 ENCOUNTER — TELEPHONE (OUTPATIENT)
Age: 48
End: 2023-08-30

## 2023-08-30 NOTE — TELEPHONE ENCOUNTER
Patient calling back to request if she does not answer to leave a detailed voicemail because her cell phone may die or to send her a message on [employer]

## 2023-08-30 NOTE — TELEPHONE ENCOUNTER
Patient last seen on 5/12/23 with Dr Darnell Tolentino in [facility]. Patient calling for Dr. Broussard Duty opinion. She was recent in the ER at [facility] again for another UTI and they told her they found two different types of bacteria and are putting her on two different antibiotics but she does not want to take them until Dr Darnell Tolentino reviews if this is the correct medications.  She stated the one medication they are putting her on is penicillin     Patient requesting a call back at [phone]

## 2023-08-31 DIAGNOSIS — N39.0 RECURRENT UTI: Primary | ICD-10-CM

## 2023-08-31 RX ORDER — LEVOFLOXACIN 250 MG/1
250 TABLET ORAL DAILY
Qty: 3 TABLET | Refills: 0 | Status: SHIPPED | OUTPATIENT
Start: 2023-08-31 | End: 2023-09-03

## 2023-08-31 NOTE — TELEPHONE ENCOUNTER
Pt called asking why she has not gotten a call back yet.  Pt is in pain and requested a call back ASAP

## 2023-09-01 DIAGNOSIS — B37.31 YEAST VAGINITIS: Primary | ICD-10-CM

## 2023-09-01 RX ORDER — FLUCONAZOLE 200 MG/1
200 TABLET ORAL DAILY
COMMUNITY
End: 2023-09-01 | Stop reason: SDUPTHER

## 2023-09-01 RX ORDER — FLUCONAZOLE 200 MG/1
200 TABLET ORAL DAILY
Qty: 1 TABLET | Refills: 0 | Status: SHIPPED | OUTPATIENT
Start: 2023-09-01 | End: 2023-09-02

## 2023-09-01 NOTE — TELEPHONE ENCOUNTER
Medication: fluconazole (DIFLUCAN) 200 mg    Quantity: 2 tablets    Pharmacy: [facility]  [address]    PCP/Speciality: Urology    Enough for 3 days - No (Needs ASAP)

## 2023-09-05 DIAGNOSIS — B37.31 YEAST VAGINITIS: Primary | ICD-10-CM

## 2023-09-05 RX ORDER — FLUCONAZOLE 200 MG/1
200 TABLET ORAL DAILY
Qty: 1 TABLET | Refills: 0 | Status: SHIPPED | OUTPATIENT
Start: 2023-09-05 | End: 2023-09-06

## 2023-09-15 ENCOUNTER — NURSE TRIAGE (OUTPATIENT)
Age: 48
End: 2023-09-15

## 2023-09-15 ENCOUNTER — NURSE TRIAGE (OUTPATIENT)
Dept: OTHER | Facility: OTHER | Age: 48
End: 2023-09-15

## 2023-09-15 NOTE — TELEPHONE ENCOUNTER
Returned call to [facility] and reviewed ERIN Paige's note and recommendations. Patient agreeable to ER for IV antibiotics.

## 2023-09-15 NOTE — TELEPHONE ENCOUNTER
Patient called upset and wants Dr. Travon Story or Benjamin's opinion on what she should do. She spoke with someone already and is not pleased with what they said. Please give her a call she trusts Dr. Erika Elder and he told her to call him whenever she needed him. He found her bladder cancer and beat it so there's a connection with him. She was teary because it's a birthday and wedding anniversary this weekend and she's going away.     CB: [phone]

## 2023-09-15 NOTE — TELEPHONE ENCOUNTER
I reviewed patients urine culture from 9/12 which is positive for Staphylococcus epidermidis and Enterococcus faecalis. Lab did not provide sensitivity to Staphylococcus epidermidis and only provided sensitivity to Enterococcus. Amoxicillin would treated Enterococcus, but is Staphylococcus epidermidis is typically resistant penicillins. Without sensitivity to Staphylococcus I cannot make appropriate recommendations for PO antibiotics. Due to reports of weakness, fevers, and chills I would recommend patient be evaluated in the emergency department as she will likely require IV antibiotics until sensitivity can be obtained on Staphylococcus.      Flavia Coe

## 2023-09-15 NOTE — TELEPHONE ENCOUNTER
Left a voicemail per her communication sheet that her symptoms should be evaluated by her PCP and or gynecologist. Office phone number to call us back was left in message.              Regarding: Boils in Vagina  ----- Message from Bipin Asif sent at 9/15/2023  8:59 AM EDT -----  PT is experiencing the following symptoms and would like someone to call her back  Chills with fever of 100 to 101  Weak and tired  Pain with urination  Back and flank pain  Spasms  Boils on vagina  Never hungry and always nauseous     Callback [phone]

## 2023-09-15 NOTE — TELEPHONE ENCOUNTER
Regarding: Result concern  ----- Message from [facility] sent at 9/15/2023  5:53 AM EDT -----  "I saw my urine results off of mychart and qi am worried that it may sepsis.  Should I g to th ER?"

## 2023-09-15 NOTE — TELEPHONE ENCOUNTER
Reason for Disposition  • [1] Follow-up call from patient regarding patient's clinical status AND [2] information urgent    Additional Information  • Lab result questions    Answer Assessment - Initial Assessment Questions  1. REASON FOR CALL or QUESTION: "What is your reason for calling today?" or "How can I best help you?" or "What question do you have that I can help answer?"      Pt reports that she received her urine test results (she reports Dr. Jim Bustos ordered them on 9/11) from [facility] lab and that it is showing that she has Staphylococcus. Patient reports that she has been having a low-grade fever of 100.0 oral, that she has been feeling weak, tired, and having chills. She would like to know if she needs to be antibiotics or go to the ER because she has had sepsis before. Informed patient that I am unable to see the results presently and suggested that she send photos of results to her MyChart so that they can be reviewed when the office opens. I informed her that someone will contact her from the office after the office opens this morning. Patient was appreciative.     Protocols used: PCP CALL - NO TRIAGE-ADULT-, INFORMATION ONLY CALL - NO TRIAGE-ADULT-AH

## 2023-09-17 ENCOUNTER — NURSE TRIAGE (OUTPATIENT)
Dept: OTHER | Facility: OTHER | Age: 48
End: 2023-09-17

## 2023-09-18 ENCOUNTER — APPOINTMENT (EMERGENCY)
Dept: CT IMAGING | Facility: HOSPITAL | Age: 48
End: 2023-09-18
Payer: MEDICARE

## 2023-09-18 ENCOUNTER — HOSPITAL ENCOUNTER (EMERGENCY)
Facility: HOSPITAL | Age: 48
Discharge: HOME | End: 2023-09-18
Attending: EMERGENCY MEDICINE
Payer: MEDICARE

## 2023-09-18 VITALS
HEART RATE: 90 BPM | SYSTOLIC BLOOD PRESSURE: 145 MMHG | TEMPERATURE: 98.5 F | DIASTOLIC BLOOD PRESSURE: 86 MMHG | OXYGEN SATURATION: 98 % | RESPIRATION RATE: 18 BRPM

## 2023-09-18 DIAGNOSIS — R10.9 FLANK PAIN: ICD-10-CM

## 2023-09-18 DIAGNOSIS — N18.9 CKD (CHRONIC KIDNEY DISEASE): ICD-10-CM

## 2023-09-18 DIAGNOSIS — L30.9 DERMATITIS OF VULVA: Primary | ICD-10-CM

## 2023-09-18 DIAGNOSIS — R73.9 HYPERGLYCEMIA: ICD-10-CM

## 2023-09-18 LAB
ALBUMIN SERPL BCP-MCNC: 4.2 G/DL (ref 3.5–5)
ALP SERPL-CCNC: 72 U/L (ref 34–104)
ALT SERPL W P-5'-P-CCNC: 19 U/L (ref 7–52)
ANION GAP SERPL CALCULATED.3IONS-SCNC: 12 MMOL/L
AST SERPL W P-5'-P-CCNC: 14 U/L (ref 13–39)
BACTERIA UR QL AUTO: NORMAL /HPF
BASOPHILS # BLD AUTO: 0.05 THOUSANDS/ÂΜL (ref 0–0.1)
BASOPHILS NFR BLD AUTO: 1 % (ref 0–1)
BILIRUB SERPL-MCNC: 0.35 MG/DL (ref 0.2–1)
BILIRUB UR QL STRIP: NEGATIVE
BUN SERPL-MCNC: 26 MG/DL (ref 5–25)
CALCIUM SERPL-MCNC: 9.1 MG/DL (ref 8.4–10.2)
CHLORIDE SERPL-SCNC: 103 MMOL/L (ref 96–108)
CLARITY UR: CLEAR
CO2 SERPL-SCNC: 17 MMOL/L (ref 21–32)
COLOR UR: COLORLESS
CREAT SERPL-MCNC: 1.56 MG/DL (ref 0.6–1.3)
EOSINOPHIL # BLD AUTO: 0.09 THOUSAND/ÂΜL (ref 0–0.61)
EOSINOPHIL NFR BLD AUTO: 1 % (ref 0–6)
ERYTHROCYTE [DISTWIDTH] IN BLOOD BY AUTOMATED COUNT: 14.7 % (ref 11.6–15.1)
GFR SERPL CREATININE-BSD FRML MDRD: 39 ML/MIN/1.73SQ M
GLUCOSE SERPL-MCNC: 277 MG/DL (ref 65–140)
GLUCOSE SERPL-MCNC: 408 MG/DL (ref 65–140)
GLUCOSE UR STRIP-MCNC: ABNORMAL MG/DL
HCT VFR BLD AUTO: 37.1 % (ref 34.8–46.1)
HGB BLD-MCNC: 12.1 G/DL (ref 11.5–15.4)
HGB UR QL STRIP.AUTO: ABNORMAL
IMM GRANULOCYTES # BLD AUTO: 0.06 THOUSAND/UL (ref 0–0.2)
IMM GRANULOCYTES NFR BLD AUTO: 1 % (ref 0–2)
KETONES UR STRIP-MCNC: NEGATIVE MG/DL
LEUKOCYTE ESTERASE UR QL STRIP: NEGATIVE
LIPASE SERPL-CCNC: 55 U/L (ref 11–82)
LYMPHOCYTES # BLD AUTO: 1.45 THOUSANDS/ÂΜL (ref 0.6–4.47)
LYMPHOCYTES NFR BLD AUTO: 17 % (ref 14–44)
MCH RBC QN AUTO: 27.3 PG (ref 26.8–34.3)
MCHC RBC AUTO-ENTMCNC: 32.6 G/DL (ref 31.4–37.4)
MCV RBC AUTO: 84 FL (ref 82–98)
MONOCYTES # BLD AUTO: 0.4 THOUSAND/ÂΜL (ref 0.17–1.22)
MONOCYTES NFR BLD AUTO: 5 % (ref 4–12)
NEUTROPHILS # BLD AUTO: 6.75 THOUSANDS/ÂΜL (ref 1.85–7.62)
NEUTS SEG NFR BLD AUTO: 75 % (ref 43–75)
NITRITE UR QL STRIP: NEGATIVE
NON-SQ EPI CELLS URNS QL MICRO: NORMAL /HPF
NRBC BLD AUTO-RTO: 0 /100 WBCS
PH UR STRIP.AUTO: 6.5 [PH]
PLATELET # BLD AUTO: 259 THOUSANDS/UL (ref 149–390)
PMV BLD AUTO: 9.8 FL (ref 8.9–12.7)
POTASSIUM SERPL-SCNC: 4.4 MMOL/L (ref 3.5–5.3)
PROT SERPL-MCNC: 7.1 G/DL (ref 6.4–8.4)
PROT UR STRIP-MCNC: ABNORMAL MG/DL
RBC # BLD AUTO: 4.44 MILLION/UL (ref 3.81–5.12)
RBC #/AREA URNS AUTO: NORMAL /HPF
SODIUM SERPL-SCNC: 132 MMOL/L (ref 135–147)
SP GR UR STRIP.AUTO: 1.01 (ref 1–1.03)
UROBILINOGEN UR STRIP-ACNC: <2 MG/DL
WBC # BLD AUTO: 8.8 THOUSAND/UL (ref 4.31–10.16)
WBC #/AREA URNS AUTO: NORMAL /HPF

## 2023-09-18 PROCEDURE — 96361 HYDRATE IV INFUSION ADD-ON: CPT

## 2023-09-18 PROCEDURE — 99285 EMERGENCY DEPT VISIT HI MDM: CPT

## 2023-09-18 PROCEDURE — 99283 EMERGENCY DEPT VISIT LOW MDM: CPT

## 2023-09-18 PROCEDURE — 81001 URINALYSIS AUTO W/SCOPE: CPT | Performed by: EMERGENCY MEDICINE

## 2023-09-18 PROCEDURE — 80053 COMPREHEN METABOLIC PANEL: CPT | Performed by: EMERGENCY MEDICINE

## 2023-09-18 PROCEDURE — 82948 REAGENT STRIP/BLOOD GLUCOSE: CPT

## 2023-09-18 PROCEDURE — 74177 CT ABD & PELVIS W/CONTRAST: CPT

## 2023-09-18 PROCEDURE — 96375 TX/PRO/DX INJ NEW DRUG ADDON: CPT

## 2023-09-18 PROCEDURE — 87086 URINE CULTURE/COLONY COUNT: CPT

## 2023-09-18 PROCEDURE — 36415 COLL VENOUS BLD VENIPUNCTURE: CPT

## 2023-09-18 PROCEDURE — 83690 ASSAY OF LIPASE: CPT

## 2023-09-18 PROCEDURE — 96374 THER/PROPH/DIAG INJ IV PUSH: CPT

## 2023-09-18 PROCEDURE — G1004 CDSM NDSC: HCPCS

## 2023-09-18 PROCEDURE — 85025 COMPLETE CBC W/AUTO DIFF WBC: CPT | Performed by: EMERGENCY MEDICINE

## 2023-09-18 RX ORDER — MORPHINE SULFATE 10 MG/ML
6 INJECTION, SOLUTION INTRAMUSCULAR; INTRAVENOUS ONCE
Status: COMPLETED | OUTPATIENT
Start: 2023-09-18 | End: 2023-09-18

## 2023-09-18 RX ORDER — ONDANSETRON 2 MG/ML
4 INJECTION INTRAMUSCULAR; INTRAVENOUS ONCE
Status: COMPLETED | OUTPATIENT
Start: 2023-09-18 | End: 2023-09-18

## 2023-09-18 RX ADMIN — ONDANSETRON 4 MG: 2 INJECTION INTRAMUSCULAR; INTRAVENOUS at 04:30

## 2023-09-18 RX ADMIN — IOHEXOL 100 ML: 350 INJECTION, SOLUTION INTRAVENOUS at 04:41

## 2023-09-18 RX ADMIN — SODIUM CHLORIDE 1000 ML: 0.9 INJECTION, SOLUTION INTRAVENOUS at 04:31

## 2023-09-18 RX ADMIN — MORPHINE SULFATE 6 MG: 10 INJECTION INTRAVENOUS at 04:30

## 2023-09-18 NOTE — DISCHARGE INSTRUCTIONS
Schedule an appointment with your primary care provider in the next 2 to 3 days for reevaluation of your dermatitis. As discussed, apply a thin layer of barrier cream, followed by stoma powder, followed by Cavilon barrier, followed by second dosing of stoma powder. Complete this twice daily and cleanse your perineum with warm water. Avoid scratching or irritating the affected area. Continue taking your Macrobid as directed. Follow-up with Dr. Kelin Olmos outpatient for your recent finding of UTI by [facility]. We will call if any need for change in antibiotic regimen based off of today's urine culture results.

## 2023-09-18 NOTE — TELEPHONE ENCOUNTER
Reason for Disposition  • Patient sounds very sick or weak to the triager    Answer Assessment - Initial Assessment Questions  1. SYMPTOM: "What's the main symptom you're concerned about?" (e.g., frequency, incontinence)      Back and flank pain, chills, fever, pain with urination and have UA done which were reviewed with Dr. Terry Zhao. 2. ONSET: "When did the  *No Answer*  start?"      8/30  3. PAIN: "Is there any pain?" If Yes, ask: "How bad is it?" (Scale: 1-10; mild, moderate, severe)      8/10  4. CAUSE: "What do you think is causing the symptoms?"      UA positive for staphylococcus epidermidis and enterococcus faecalis, was recommended to go to ER for treatment and was unhappy with care. Pt is looking for advice on if she needs to go back for treatment since she is leaving for vacation tomorrow.   5. OTHER SYMPTOMS: "Do you have any other symptoms?" (e.g., fever, flank pain, blood in urine, pain with urination)      Fever, back and flank pain, pain with urination, chills    Protocols used: [facility]

## 2023-09-18 NOTE — ED PROVIDER NOTES
History  Chief Complaint   Patient presents with   • Painful Urination     Dx with cystitis. On macrobid. Pt does not have bladder. Positive urine cultures. Patient is a 58-year-old female with PMH of HTN, HLD, GERD, asthma, T2DM, bipolar disorder, seizures, and bladder cancer s/p cystectomy with ileal conduit presenting for evaluation of concern for UTI as well as vaginitis like symptoms. The patient was seen at [facility] ER 3 days ago and started on Macrobid for UTI. The patient notes she was instructed by her primary urology team that she should have been admitted at that time for IV antibiotics. She expresses concern for ongoing UTI. She also complains of burning, itching, discomfort to her perineum. She has had a history of yeast infections for which she has taken Diflucan in the past most recently a few days ago. She notes it is difficult to examine herself down there but she does feel more moisture and feels possible lesions/bumps. She is not sexually active. She denies risk of STI. She denies new fevers or chills since unremarkable. She denies headache, lightheadedness/dizziness, CP/SOB, abdominal pain, N/V/D, change in urine output from bladder conduit, hematuria, and change in bladder or appearance of urine. History provided by:  Patient and spouse   used: No        Prior to Admission Medications   Prescriptions Last Dose Informant Patient Reported? Taking? COLESTIPOL HCL PO   Yes No   Cholecalciferol (VITAMIN D3 GUMMIES ADULT PO)   Yes No   Continuous Blood Gluc  (FreeStyle Laureen 2 [place]) EDSON   Yes No   Si EACH BY MISCELLANEOUS ROUTE ONE TIME FOR 1 DOSE. Patient not taking: Reported on 2023   Continuous Blood Gluc Sensor (FreeStyle Laureen 2 Sensor) MISC   Yes No   Si EACH BY MISCELLANEOUS ROUTE EVERY 14 (FOURTEEN) DAYS.  CHANGE EVERY 14 DAYS. E11.65   Patient not taking: Reported on 2023   Dulaglutide (Trulicity) 6.93 UR/0.1IY SOPN   Yes No Sig: Inject 0.75 mg under the skin Once a week   [place]iba FlexTouch 200 units/mL CONCENTRATED U-200 injection pen  Self Yes No   Sig: INJECT 140 UNITS UNDER THE SKIN DAILY. atenolol (TENORMIN) 25 mg tablet  Self Yes No   Si daily   azelastine (ASTELIN) 0.1 % nasal spray  Self Yes No   Si spray into each nostril   clonazePAM (KlonoPIN) 0.5 mg tablet  Self Yes No   Sig: Take 0.5 mg by mouth 3 (three) times a day   clotrimazole (LOTRIMIN) 1 % cream  Self Yes No   Sig: Apply topically as needed   Patient not taking: Reported on 2023   clotrimazole-betamethasone (LOTRISONE) 1-0.05 % cream  Self Yes No   Sig: APPLY TO AFFECTED AREA(S) AS DIRECTED   Patient not taking: Reported on 2023   ferrous sulfate 325 (65 Fe) mg tablet   Yes No   Sig: TAKE 1 TABLET BY MOUTH EVERY DAY WITH BREAKFAST. .. OTC   flavoxATE (URISPAS) 100 mg tablet   No No   Sig: Take 1 tablet (100 mg total) by mouth 3 (three) times a day as needed for bladder spasms   fluticasone (FLONASE) 50 mcg/act nasal spray  Self Yes No   Si sprays into each nostril 2 (two) times a day   glucose blood test strip  Self Yes No   Sig: Freestyle Lite test strips. Test 8XD. Patient not taking: Reported on 2023   lamoTRIgine (LaMICtal) 200 MG tablet  Self No No   Sig: TAKE 1 TABLET TWICE DAILY. Patient not taking: Reported on 2023   lamoTRIgine (LaMICtal) 25 mg tablet  Self No No   Sig: TAKE 1 (25MG) TABLET BY MOUTH TWICE A DAY IN ADDITION TO 200MG TWICE DAILY   losartan (COZAAR) 25 mg tablet   Yes No   Sig: TAKE 1 TABLET BY MOUTH 1 TIME EACH DAY. medroxyPROGESTERone acetate (DEPO-PROVERA SYRINGE) 150 mg/mL injection   Yes No   Sig: INJECT 150 MG INJECT INTO THE MUSCLE EVERY 3 (THREE) MONTHS.    metFORMIN (GLUCOPHAGE-XR) 500 mg 24 hr tablet  Self Yes No   Sig: Take 1,000 mg by mouth 2 (two) times a day     omeprazole (PriLOSEC) 40 MG capsule   Yes No   ondansetron (ZOFRAN-ODT) 4 mg disintegrating tablet   Yes No   Sig: as needed Patient not taking: Reported on 5/12/2023   oxyCODONE (ROXICODONE) 5 immediate release tablet   Yes No   Sig: Take 5 mg by mouth   potassium citrate (UROCIT-K) 10 mEq   Yes No   Sig: PLEASE SEE ATTACHED FOR DETAILED DIRECTIONS   pravastatin (PRAVACHOL) 40 mg tablet  Self Yes No   Sig: Take 40 mg by mouth   venlafaxine (EFFEXOR-XR) 75 mg 24 hr capsule  Self Yes No   Sig: Take 75 mg by mouth every evening      Facility-Administered Medications: None       Past Medical History:   Diagnosis Date   • Anxiety    • Arthritis of lumbar spine    • Asthma     "mild"   • Bilateral dry eyes    • Bilateral shoulder pain     with right arm pain from neck   • Bipolar disorder (HCC)    • Bladder CA in situ    • Bulging of cervical intervertebral disc     C7 and C8   • Bursitis of hip, right    • Cancer (HCC)     bladder   • Carpal tunnel syndrome, bilateral    • Cataract     right   • Cervical spine arthritis    • Chronic pain disorder    • Colitis    • Cystitis, chronic    • Dental crown present    • Depression    • Deviated septum    • Diabetes mellitus (HCC)    • Family history of factor V Leiden mutation     Mom has;  pt reports " I tested negative"   • Fatty liver    • GERD (gastroesophageal reflux disease)    • Grinding teeth    • High cholesterol    • History of heart block     "from reaction to Lithium"   • History of recent fall     3 weeks ago or so fell on ice   • HPV (human papilloma virus) infection     "dormant"   • Hypertension    • Irritable bowel syndrome    • Left knee injury     "years ago"   • Nicotine dependence    • Psychiatric disorder     Panic disorder/none in a long time   • Rotator cuff tendinitis     bilat   • Seizures ([address])     "diagnosed with tonic/clonic seizures from coming off lithium but since on lamictal no seizure x 10 years"   • Shortness of breath     mild   • Sinus infection     saw Dr Taylor Mckay 2/18 and is taking antibiotics   • Wears glasses        Past Surgical History:   Procedure Laterality Date   • BLADDER SURGERY N/A    • CHOLECYSTECTOMY     • ESOPHAGOGASTRODUODENOSCOPY     • GYNECOLOGIC CRYOSURGERY      "for HPV"   • INDUCED       "age 25"   • INSTILLATION MYTOMYCIN N/A 2019    Procedure: INSTILLATION MITOMYCIN;  Surgeon: Renetta Odom MD;  Location: [facility];  Service: Urology   • NH CYSTO W/REMOVAL OF LESIONS SMALL N/A 2019    Procedure: TRANSURETHRAL RESECTION OF BLADDER TUMOR (TURBT); Surgeon: Renetta Odom MD;  Location: [facility];  Service: Urology   • URETHRA SURGERY         • WISDOM TOOTH EXTRACTION         Family History   Problem Relation Age of Onset   • Aneurysm Father    • Vascular Disease Mother    • Depression Mother    • Anxiety disorder Mother    • Alzheimer's disease Paternal Grandmother    • Prostate cancer Paternal Grandfather    • Parkinsonism Paternal Grandfather    • Bipolar disorder Brother    • Diabetes Family      I have reviewed and agree with the history as documented. E-Cigarette/Vaping   • E-Cigarette Use Never User      E-Cigarette/Vaping Substances   • Nicotine No    • THC No    • CBD No    • Flavoring No    • Other No    • Unknown No      Social History     Tobacco Use   • Smoking status: Former     Packs/day: 1.50     Years: 16.00     Total pack years: 24.00     Types: Cigarettes     Quit date: 10/31/2019     Years since quitting: 3.8   • Smokeless tobacco: Never   • Tobacco comments:     has been trying to quit   Vaping Use   • Vaping Use: Never used   Substance Use Topics   • Alcohol use: No   • Drug use: No       Review of Systems   Constitutional: Negative for chills and fever. Respiratory: Negative for cough and shortness of breath. Cardiovascular: Negative for chest pain. Gastrointestinal: Positive for abdominal pain (R sided). Negative for abdominal distention, anal bleeding, blood in stool, constipation, diarrhea, nausea, rectal pain and vomiting.    Genitourinary: Positive for dyspareunia, flank pain (R sided), pelvic pain, vaginal discharge (moist, thin, white) and vaginal pain (external and internal per pt). Negative for decreased urine volume, difficulty urinating, hematuria and vaginal bleeding. Musculoskeletal: Positive for back pain (R middle and lower back). Negative for gait problem. Skin: Positive for rash (to perineum). Negative for color change, pallor and wound. Neurological: Negative for dizziness, syncope, weakness, light-headedness and headaches. All other systems reviewed and are negative. Physical Exam  Physical Exam  Vitals and nursing note reviewed. Exam conducted with a chaperone present (Patient's ). Constitutional:       General: She is awake. She is not in acute distress (evidencing moderate discomfort, acutely anxious, in NAD). Appearance: Normal appearance. She is well-developed. She is morbidly obese. She is not ill-appearing, toxic-appearing or diaphoretic. HENT:      Head: Normocephalic and atraumatic. Nose: Nose normal.      Mouth/Throat:      Lips: Pink. No lesions. Mouth: Mucous membranes are moist.      Pharynx: Oropharynx is clear. Uvula midline. Eyes:      General: Lids are normal. Vision grossly intact. Gaze aligned appropriately. Extraocular Movements: Extraocular movements intact. Conjunctiva/sclera: Conjunctivae normal.   Neck:      Trachea: Trachea and phonation normal. No abnormal tracheal secretions. Cardiovascular:      Rate and Rhythm: Normal rate and regular rhythm. Pulses:           Radial pulses are 2+ on the right side and 2+ on the left side. Dorsalis pedis pulses are 2+ on the right side and 2+ on the left side. Posterior tibial pulses are 2+ on the right side and 2+ on the left side. Heart sounds: Normal heart sounds, S1 normal and S2 normal. No murmur heard. Pulmonary:      Effort: Pulmonary effort is normal. No tachypnea or respiratory distress. Breath sounds: Normal breath sounds and air entry.  No stridor, decreased air movement or transmitted upper airway sounds. No decreased breath sounds. Abdominal:      General: Bowel sounds are normal. There is no distension. Palpations: Abdomen is soft. Tenderness: There is abdominal tenderness (Mild) in the right upper quadrant and right lower quadrant. There is right CVA tenderness. There is no left CVA tenderness, guarding or rebound. Negative signs include Al's sign. Genitourinary:     Exam position: Lithotomy position. Pubic Area: Rash (Dermatitis to bilateral inner labia) present. Labia:         Right: Rash and tenderness present. No lesion or injury. Left: Rash and tenderness present. No lesion or injury. Urethra: Urethral pain present. No prolapse, urethral swelling or urethral lesion. Vagina: Tenderness present. No vaginal discharge, bleeding, lesions or prolapsed vaginal walls. Comments: Moist, erythematous, tender diaper-like dermatitis to perineum. No lesions. No pustules or abscesses. Limited bimanual examination of vaginal vault. Tenderness to palpation of external vaginal vault, no fluctuance or vaginal vault walls, no discharge, no lesions or abscesses. Musculoskeletal:         General: Normal range of motion. Cervical back: Normal range of motion and neck supple. Right lower leg: No edema. Left lower leg: No edema. Comments: WEBBER, 5/5 strength throughout, sensation intact, no focal joint swelling. Ambulatory with steady gait. Lymphadenopathy:      Lower Body: No right inguinal adenopathy. No left inguinal adenopathy. Skin:     General: Skin is warm and dry. Capillary Refill: Capillary refill takes less than 2 seconds. Neurological:      General: No focal deficit present. Mental Status: She is alert and oriented to person, place, and time. Mental status is at baseline. GCS: GCS eye subscore is 4. GCS verbal subscore is 5. GCS motor subscore is 6. Psychiatric:         Mood and Affect: Mood is anxious. Behavior: Behavior is cooperative.          Vital Signs  ED Triage Vitals   Temperature Pulse Respirations Blood Pressure SpO2   09/18/23 0137 09/18/23 0137 09/18/23 0137 09/18/23 0147 09/18/23 0137   98.5 °F (36.9 °C) 95 20 150/72 96 %      Temp Source Heart Rate Source Patient Position - Orthostatic VS BP Location FiO2 (%)   09/18/23 0137 09/18/23 0445 09/18/23 0445 09/18/23 0445 --   Oral Monitor Lying Right arm       Pain Score       --                  Vitals:    09/18/23 0137 09/18/23 0147 09/18/23 0445   BP:  150/72 145/86   Pulse: 95  90   Patient Position - Orthostatic VS:   Lying         Visual Acuity      ED Medications  Medications   sodium chloride 0.9 % bolus 1,000 mL (0 mL Intravenous Stopped 9/18/23 0716)   ondansetron (ZOFRAN) injection 4 mg (4 mg Intravenous Given 9/18/23 0430)   morphine injection 6 mg (6 mg Intravenous Given 9/18/23 0430)   iohexol (OMNIPAQUE) 350 MG/ML injection (MULTI-DOSE) 100 mL (100 mL Intravenous Given 9/18/23 0441)       Diagnostic Studies  Results Reviewed     Procedure Component Value Units Date/Time    Urine culture [[id number]] Collected: 09/18/23 0148    Lab Status: Final result Specimen: Urine, Clean Catch Updated: 09/19/23 0705     Urine Culture No Growth <1000 cfu/mL    Lipase [[id number]]  (Normal) Collected: 09/18/23 0146    Lab Status: Final result Specimen: Blood from Arm, Right Updated: 09/18/23 0443     Lipase 55 u/L     Fingerstick Glucose (POCT) [[id number]]  (Abnormal) Collected: 09/18/23 0430    Lab Status: Final result Updated: 09/18/23 0431     POC Glucose 277 mg/dl     Comprehensive metabolic panel [[id number]]  (Abnormal) Collected: 09/18/23 0146    Lab Status: Final result Specimen: Blood from Arm, Right Updated: 09/18/23 0241     Sodium 132 mmol/L      Potassium 4.4 mmol/L      Chloride 103 mmol/L      CO2 17 mmol/L      ANION GAP 12 mmol/L      BUN 26 mg/dL      Creatinine 1.56 mg/dL Glucose 408 mg/dL      Calcium 9.1 mg/dL      AST 14 U/L      ALT 19 U/L      Alkaline Phosphatase 72 U/L      Total Protein 7.1 g/dL      Albumin 4.2 g/dL      Total Bilirubin 0.35 mg/dL      eGFR 39 ml/min/1.73sq m     Narrative:      [facility] Disease (CKD):   •  Stage 1 with normal or high GFR (GFR > 90 mL/min/1.73 square meters)  •  Stage 2 Mild CKD (GFR = 60-89 mL/min/1.73 square meters)  •  Stage 3A Moderate CKD (GFR = 45-59 mL/min/1.73 square meters)  •  Stage 3B Moderate CKD (GFR = 30-44 mL/min/1.73 square meters)  •  Stage 4 Severe CKD (GFR = 15-29 mL/min/1.73 square meters)  •  Stage 5 End Stage CKD (GFR <15 mL/min/1.73 square meters)  Note: GFR calculation is accurate only with a steady state creatinine    Urine Microscopic [[id number]]  (Normal) Collected: 09/18/23 0148    Lab Status: Final result Specimen: Urine, Clean Catch Updated: 09/18/23 0236     RBC, UA 1-2 /hpf      WBC, UA 1-2 /hpf      Epithelial Cells None Seen /hpf      Bacteria, UA None Seen /hpf     UA w Reflex to Microscopic w Reflex to Culture [[id number]]  (Abnormal) Collected: 09/18/23 0148    Lab Status: Final result Specimen: Urine, Clean Catch Updated: 09/18/23 0235     Color, UA Colorless     Clarity, UA Clear     Specific Gravity, UA 1.008     pH, UA 6.5     Leukocytes, UA Negative     Nitrite, UA Negative     Protein, UA 50 (1+) mg/dl      Glucose,  (1/2%) mg/dl      Ketones, UA Negative mg/dl      Urobilinogen, UA <2.0 mg/dl      Bilirubin, UA Negative     Occult Blood, UA Trace    CBC and differential [[id number]] Collected: 09/18/23 0146    Lab Status: Final result Specimen: Blood from Arm, Right Updated: 09/18/23 0214     WBC 8.80 Thousand/uL      RBC 4.44 Million/uL      Hemoglobin 12.1 g/dL      Hematocrit 37.1 %      MCV 84 fL      MCH 27.3 pg      MCHC 32.6 g/dL      RDW 14.7 %      MPV 9.8 fL      Platelets 128 Thousands/uL      nRBC 0 /100 WBCs      Neutrophils Relative 75 %      Immat GRANS % 1 %      Lymphocytes Relative 17 %      Monocytes Relative 5 %      Eosinophils Relative 1 %      Basophils Relative 1 %      Neutrophils Absolute 6.75 Thousands/µL      Immature Grans Absolute 0.06 Thousand/uL      Lymphocytes Absolute 1.45 Thousands/µL      Monocytes Absolute 0.40 Thousand/µL      Eosinophils Absolute 0.09 Thousand/µL      Basophils Absolute 0.05 Thousands/µL                  CT abdomen pelvis with contrast   Final Result by Hanna Mullins MD (09/18 5175)      No acute findings in the abdomen or pelvis. Postsurgical changes of cystectomy and ileal conduit. Hepatic steatosis. Workstation performed: [id number]                    Procedures  Procedures         ED Course  ED Course as of 09/19/23 1002   Mon Sep 18, 2023   0315 Triage vital signs with mild tachycardia 95, all others within normal limits and stable   0316 First nurse orders placed including CMP, CBC, UA, urine micro   0316 Sodium(!): 132  Mild hyponatremia   0316 Creatinine(!): 1.56  When reviewing [facility] records, this is the baseline creatinine BUN for the patient   0316 Glucose, Random(!): 408  Notably elevated glucose, will reassess after 1 L fluid resuscitation   0316 Urine Microscopic  No evidence of infection on urine micro   0435 POC Glucose(!): 277  Will reassess after 1L IVF bolus   0446 Lipase: 55  WNL   0558 CT abdomen pelvis with contrast  IMPRESSION:     No acute findings in the abdomen or pelvis.     Postsurgical changes of cystectomy and ileal conduit.     Hepatic steatosis. Medical Decision Making  DDX including but not limited to: Dermatitis of vulva, UTI, pyelonephritis, stricture, volvulus, bowel obstruction    On initial examination, the patient is acutely anxious with slightly pressured speech. She believes the discomfort "between her legs" is related to her recent finding of UTI.   On further questioning, the patient believes she is not responding to UTI treatment as she has ongoing pain to her perineum. I discussed given she had placement of ileal conduit after bladder was removed a rash or sensitivity to the perineum is likely unrelated to the urinary tract or kidneys. She expresses relief when hearing this. I discussed that her urine today appears as if it is clearing infection, however we will still send urine culture to make certain that Vincentwena Cherise is the best choice for her recent UTI. Her kidney function is at baseline. She is without leukocytosis or systemic signs of infection including fevers or chills. She is afebrile here. Overall, given HPI, physical exam, and blood work, UA, and CT findings, doubt resistance to UTI treatment and/or progression of UTI to pyelonephritis. On exam, patient does have a diaper like dermatitis rash to the vulva. I completed a crusting technique with thin layer of zinc oxide cream followed by stoma powder followed by Cavilon barrier followed by second layer of stoma powder. Patient noted almost instant relief of discomfort to her perineum. Patient recently took Diflucan is without satellite lesions or yeasty white discharge. Given limited and localized irritation, plan for topical treatment of suspected dermatitis. Instructed patient on continued use of crusting technique to treat her discomfort. I discussed the importance of controlling her sugars as elevated blood sugar will vastly increase her risk of swelling skin infections loss of resistance to this treatment regimen. Blood sugar noted to be downtrending to 200s even prior to 1 L fluid bolus. Patient did take her insulin prior to arrival.  Evidence of metabolic acidosis from longstanding hyperglycemia. Given overall stable vital signs, stable work-up, plan made for discharge to home with close follow-up with her primary urology team, as well as PCP, for further evaluation of her symptoms.   The patient is in agreement with plan and has no further questions at this time.  at bedside for ER visit and is also in agreement with plan. They have no further questions at this time. Notably less anxious, with improved appearance, in no acute distress at time of discharge from the hospital.    CKD (chronic kidney disease): chronic illness or injury  Dermatitis of vulva: acute illness or injury  Flank pain: acute illness or injury  Hyperglycemia: self-limited or minor problem  Amount and/or Complexity of Data Reviewed  Independent Historian: spouse  External Data Reviewed: labs, radiology and notes. Labs: ordered. Decision-making details documented in ED Course. Radiology: ordered. Decision-making details documented in ED Course. Risk  OTC drugs. Prescription drug management. Disposition  Final diagnoses:   Dermatitis of vulva   Hyperglycemia   Flank pain   CKD (chronic kidney disease)     Time reflects when diagnosis was documented in both MDM as applicable and the Disposition within this note     Time User Action Codes Description Comment    9/18/2023  6:39 AM Shugars, Alvira Monica Add [L30.9] Dermatitis of vulva     9/18/2023  6:40 AM Shugars, Alvira Monica Add [R73.9] Hyperglycemia     9/18/2023  6:41 AM Shugars, Alvira Monica Add [R10.9] Flank pain     9/18/2023  6:41 AM Tacos Flattery Add [N18.9] CKD (chronic kidney disease)       ED Disposition     ED Disposition   Discharge    Condition   Stable    Date/Time   Mon Sep 18, 2023  6:37 AM    Comment   Simase Lucia discharge to home/self care.                Follow-up Information     Follow up With Specialties Details Why [id number] Sola Castro MD Internal Medicine Schedule an appointment as soon as possible for a visit in 2 days For wound re-check [address] [place] [address]  [phone]            Discharge Medication List as of 9/18/2023  6:43 AM      CONTINUE these medications which have NOT CHANGED    Details   atenolol (TENORMIN) 25 mg tablet 1 daily, Historical Med      azelastine (ASTELIN) 0.1 % nasal spray 1 spray into each nostril, Starting Thu 2/21/2019, Historical Med      Cholecalciferol (VITAMIN D3 GUMMIES ADULT PO) Historical Med      clonazePAM (KlonoPIN) 0.5 mg tablet Take 0.5 mg by mouth 3 (three) times a day, Historical Med      clotrimazole (LOTRIMIN) 1 % cream Apply topically as needed, Historical Med      clotrimazole-betamethasone (LOTRISONE) 1-0.05 % cream APPLY TO AFFECTED AREA(S) AS DIRECTED, Historical Med      COLESTIPOL HCL PO Historical Med      Continuous Blood Gluc  (FreeStyle Laureen 2 [place]) EDSON 1 EACH BY MISCELLANEOUS ROUTE ONE TIME FOR 1 DOSE., Historical Med      Continuous Blood Gluc Sensor (FreeStyle Laureen 2 Sensor) MISC 1 EACH BY MISCELLANEOUS ROUTE EVERY 14 (FOURTEEN) DAYS. CHANGE EVERY 14 DAYS. E11.65, Historical Med      Dulaglutide (Trulicity) 5.76 SB/1.6XU SOPN Inject 0.75 mg under the skin Once a week, Starting Thu 10/13/2022, Historical Med      ferrous sulfate 325 (65 Fe) mg tablet TAKE 1 TABLET BY MOUTH EVERY DAY WITH BREAKFAST. .. OTC, Historical Med      flavoxATE (URISPAS) 100 mg tablet Take 1 tablet (100 mg total) by mouth 3 (three) times a day as needed for bladder spasms, Starting Tue 9/12/2023, Normal      fluticasone (FLONASE) 50 mcg/act nasal spray 2 sprays into each nostril 2 (two) times a day, Historical Med      glucose blood test strip Freestyle Lite test strips. Test 8XD. , Historical Med      !! lamoTRIgine (LaMICtal) 200 MG tablet TAKE 1 TABLET TWICE DAILY., Normal      !! lamoTRIgine (LaMICtal) 25 mg tablet TAKE 1 (25MG) TABLET BY MOUTH TWICE A DAY IN ADDITION TO 200MG TWICE DAILY, Normal      losartan (COZAAR) 25 mg tablet TAKE 1 TABLET BY MOUTH 1 TIME EACH DAY., Historical Med      medroxyPROGESTERone acetate (DEPO-PROVERA SYRINGE) 150 mg/mL injection INJECT 150 MG INJECT INTO THE MUSCLE EVERY 3 (THREE) MONTHS. , Historical Med      metFORMIN (GLUCOPHAGE-XR) 500 mg 24 hr tablet Take 1,000 mg by mouth 2 (two) times a day  , Historical Med      omeprazole (PriLOSEC) 40 MG capsule Historical Med      ondansetron (ZOFRAN-ODT) 4 mg disintegrating tablet as needed, Starting Sun 8/28/2022, Historical Med      oxyCODONE (ROXICODONE) 5 immediate release tablet Take 5 mg by mouth, Starting Mon 3/20/2023, Historical Med      potassium citrate (UROCIT-K) 10 mEq PLEASE SEE ATTACHED FOR DETAILED DIRECTIONS, Historical Med      pravastatin (PRAVACHOL) 40 mg tablet Take 40 mg by mouth, Starting Fri 10/19/2018, Historical Med      [place] FlexTouch 200 units/mL CONCENTRATED U-200 injection pen INJECT 140 UNITS UNDER THE SKIN DAILY. , Historical Med      venlafaxine (EFFEXOR-XR) 75 mg 24 hr capsule Take 75 mg by mouth every evening, Historical Med       !! - Potential duplicate medications found. Please discuss with provider. No discharge procedures on file.     PDMP Review     None          ED Provider  Electronically Signed by           ERIN Shelley  09/19/23 1002

## 2023-09-19 LAB — BACTERIA UR CULT: NORMAL

## 2023-09-21 ENCOUNTER — TELEPHONE (OUTPATIENT)
Dept: UROLOGY | Facility: CLINIC | Age: 48
End: 2023-09-21

## 2023-09-21 NOTE — TELEPHONE ENCOUNTER
Called and left a detailed Vm for pt reviewing [facility] results.  Office number left for pt to call back

## 2023-09-21 NOTE — TELEPHONE ENCOUNTER
----- Message from Aníbal Frias MD sent at 9/21/2023  8:48 AM EDT -----  Please call patient and see if she is having much in the way of symptoms. The urinalysis is unremarkable, but the culture itself shows a lot of bacteria and I think this is due to contamination. If she is not having much in the way of symptoms I do   not wish to treat her.

## 2023-09-22 NOTE — TELEPHONE ENCOUNTER
Patient called in states she is having  fowl smell of urine and frequent urination in the bag. Reports the urine specimen was taken from a needle syringe directly from the stoma, therefore contamination is not likely. Also reports she is having severe burring in the vagina the ED said it is staph, dermatitis and was given cream and has been using but it is not helping. States she called the Uro/Gyn office already regarding this and they referred her to us for this. Reports she is taking the macrobid given from ED and will complete that come Monday. Reports stomach upset from antibiotics, and is eating yogurt BID and this is helping. Reports she woke up today with a sore throat. Advised to reach out to PCP to inform them of this. Does not know if she is running a fever as she is on vacation in a hotel room, but will take temperature tomrorw, she is taking tylenol. She felt warm, advised to try to take temperature today. ED precautions reviewed. Advised to avoid bladder irritants and drink at least 64 oz. Of water per day. Client states Dr. Jamil Wall always knows how to fix her issues and she would like to make him aware of this. I advised her to call us back on Monday with an update as to how she is feeling after completing her antibiotics.

## 2023-10-09 ENCOUNTER — NURSE TRIAGE (OUTPATIENT)
Age: 48
End: 2023-10-09

## 2023-10-09 ENCOUNTER — HOSPITAL ENCOUNTER (INPATIENT)
Facility: HOSPITAL | Age: 48
LOS: 2 days | Discharge: HOME | End: 2023-10-11
Attending: EMERGENCY MEDICINE | Admitting: INTERNAL MEDICINE
Payer: MEDICARE

## 2023-10-09 DIAGNOSIS — N89.8 VAGINAL ITCHING: ICD-10-CM

## 2023-10-09 DIAGNOSIS — N39.0 UTI (URINARY TRACT INFECTION): Primary | ICD-10-CM

## 2023-10-09 DIAGNOSIS — N12 PYELONEPHRITIS: ICD-10-CM

## 2023-10-09 LAB
ALBUMIN SERPL BCP-MCNC: 4.4 G/DL (ref 3.5–5)
ALP SERPL-CCNC: 77 U/L (ref 34–104)
ALT SERPL W P-5'-P-CCNC: 21 U/L (ref 7–52)
ANION GAP SERPL CALCULATED.3IONS-SCNC: 10 MMOL/L
AST SERPL W P-5'-P-CCNC: 17 U/L (ref 13–39)
BACTERIA UR QL AUTO: ABNORMAL /HPF
BASOPHILS # BLD AUTO: 0.05 THOUSANDS/ÂΜL (ref 0–0.1)
BASOPHILS NFR BLD AUTO: 1 % (ref 0–1)
BILIRUB SERPL-MCNC: 0.35 MG/DL (ref 0.2–1)
BILIRUB UR QL STRIP: NEGATIVE
BUN SERPL-MCNC: 28 MG/DL (ref 5–25)
CALCIUM SERPL-MCNC: 9.4 MG/DL (ref 8.4–10.2)
CHLORIDE SERPL-SCNC: 103 MMOL/L (ref 96–108)
CLARITY UR: ABNORMAL
CO2 SERPL-SCNC: 22 MMOL/L (ref 21–32)
COLOR UR: ABNORMAL
CREAT SERPL-MCNC: 1.49 MG/DL (ref 0.6–1.3)
EOSINOPHIL # BLD AUTO: 0.13 THOUSAND/ÂΜL (ref 0–0.61)
EOSINOPHIL NFR BLD AUTO: 1 % (ref 0–6)
ERYTHROCYTE [DISTWIDTH] IN BLOOD BY AUTOMATED COUNT: 15.1 % (ref 11.6–15.1)
GFR SERPL CREATININE-BSD FRML MDRD: 41 ML/MIN/1.73SQ M
GLUCOSE SERPL-MCNC: 297 MG/DL (ref 65–140)
GLUCOSE UR STRIP-MCNC: NEGATIVE MG/DL
HCT VFR BLD AUTO: 37.9 % (ref 34.8–46.1)
HGB BLD-MCNC: 12.3 G/DL (ref 11.5–15.4)
HGB UR QL STRIP.AUTO: ABNORMAL
HYALINE CASTS #/AREA URNS LPF: ABNORMAL /LPF
IMM GRANULOCYTES # BLD AUTO: 0.08 THOUSAND/UL (ref 0–0.2)
IMM GRANULOCYTES NFR BLD AUTO: 1 % (ref 0–2)
KETONES UR STRIP-MCNC: NEGATIVE MG/DL
LACTATE SERPL-SCNC: 2 MMOL/L (ref 0.5–2)
LEUKOCYTE ESTERASE UR QL STRIP: ABNORMAL
LYMPHOCYTES # BLD AUTO: 1.87 THOUSANDS/ÂΜL (ref 0.6–4.47)
LYMPHOCYTES NFR BLD AUTO: 18 % (ref 14–44)
MCH RBC QN AUTO: 27.7 PG (ref 26.8–34.3)
MCHC RBC AUTO-ENTMCNC: 32.5 G/DL (ref 31.4–37.4)
MCV RBC AUTO: 85 FL (ref 82–98)
MONOCYTES # BLD AUTO: 0.62 THOUSAND/ÂΜL (ref 0.17–1.22)
MONOCYTES NFR BLD AUTO: 6 % (ref 4–12)
MUCOUS THREADS UR QL AUTO: ABNORMAL
NEUTROPHILS # BLD AUTO: 7.48 THOUSANDS/ÂΜL (ref 1.85–7.62)
NEUTS SEG NFR BLD AUTO: 73 % (ref 43–75)
NITRITE UR QL STRIP: NEGATIVE
NON-SQ EPI CELLS URNS QL MICRO: ABNORMAL /HPF
NRBC BLD AUTO-RTO: 0 /100 WBCS
PH UR STRIP.AUTO: 6.5 [PH]
PLATELET # BLD AUTO: 262 THOUSANDS/UL (ref 149–390)
PMV BLD AUTO: 9.5 FL (ref 8.9–12.7)
POTASSIUM SERPL-SCNC: 4 MMOL/L (ref 3.5–5.3)
PROT SERPL-MCNC: 7.3 G/DL (ref 6.4–8.4)
PROT UR STRIP-MCNC: ABNORMAL MG/DL
RBC # BLD AUTO: 4.44 MILLION/UL (ref 3.81–5.12)
RBC #/AREA URNS AUTO: ABNORMAL /HPF
SODIUM SERPL-SCNC: 135 MMOL/L (ref 135–147)
SP GR UR STRIP.AUTO: 1.01 (ref 1–1.03)
UROBILINOGEN UR STRIP-ACNC: <2 MG/DL
WBC # BLD AUTO: 10.23 THOUSAND/UL (ref 4.31–10.16)
WBC #/AREA URNS AUTO: ABNORMAL /HPF

## 2023-10-09 PROCEDURE — 80053 COMPREHEN METABOLIC PANEL: CPT | Performed by: EMERGENCY MEDICINE

## 2023-10-09 PROCEDURE — 87077 CULTURE AEROBIC IDENTIFY: CPT

## 2023-10-09 PROCEDURE — 87147 CULTURE TYPE IMMUNOLOGIC: CPT

## 2023-10-09 PROCEDURE — 85025 COMPLETE CBC W/AUTO DIFF WBC: CPT | Performed by: EMERGENCY MEDICINE

## 2023-10-09 PROCEDURE — 87086 URINE CULTURE/COLONY COUNT: CPT

## 2023-10-09 PROCEDURE — 96365 THER/PROPH/DIAG IV INF INIT: CPT

## 2023-10-09 PROCEDURE — 87186 SC STD MICRODIL/AGAR DIL: CPT

## 2023-10-09 PROCEDURE — 81001 URINALYSIS AUTO W/SCOPE: CPT | Performed by: EMERGENCY MEDICINE

## 2023-10-09 PROCEDURE — 83605 ASSAY OF LACTIC ACID: CPT | Performed by: EMERGENCY MEDICINE

## 2023-10-09 PROCEDURE — 99285 EMERGENCY DEPT VISIT HI MDM: CPT | Performed by: EMERGENCY MEDICINE

## 2023-10-09 PROCEDURE — 99284 EMERGENCY DEPT VISIT MOD MDM: CPT

## 2023-10-09 PROCEDURE — 36415 COLL VENOUS BLD VENIPUNCTURE: CPT | Performed by: EMERGENCY MEDICINE

## 2023-10-09 RX ADMIN — PIPERACILLIN AND TAZOBACTAM 3.38 G: 36; 4.5 INJECTION, POWDER, FOR SOLUTION INTRAVENOUS at 19:46

## 2023-10-09 NOTE — TELEPHONE ENCOUNTER
Returned call to patient. Reports went to urgent care at [facility] over the weekend and dx with two different bacteria in urine. Started on keflex. Pt reports still having a lot of symptoms flank pain, vaginal pain, chills. Said she feels like she has kidney infection. Pt would like Dr. Jim Bustos to review her chart and advise next steps. Reason for Disposition  • Patient wants to be seen    Protocols used:  FLANK PAIN-ADULT-[place]

## 2023-10-09 NOTE — TELEPHONE ENCOUNTER
Regarding: UTI and other issues  ----- Message from Bhavani Brewer sent at 10/9/2023 12:46 PM EDT -----  Pt has had UTI's for the last two months. She finished antibiotics a two weeks ago. She went went to [facility] and she is on keflex. She is upset that no one is helping her. She is very frustrated that no one has helped her. She feels terrible and she doesn't feel good at all and just wants to feel better. She trust Dr. Tish Azul.

## 2023-10-09 NOTE — TELEPHONE ENCOUNTER
Briefly discuss patients case with Dr. Char Xiao. Urine culture from 10/7 shows resistance to Keflex. With reports of chills and flank pain and concern for possible pyelonephritis recommendations would be for evaluation in the ER as potential need for IV antibiotics.

## 2023-10-10 ENCOUNTER — APPOINTMENT (INPATIENT)
Dept: CT IMAGING | Facility: HOSPITAL | Age: 48
End: 2023-10-10
Payer: MEDICARE

## 2023-10-10 PROBLEM — L53.9: Status: ACTIVE | Noted: 2023-10-10

## 2023-10-10 PROBLEM — E78.5 HYPERLIPIDEMIA: Status: ACTIVE | Noted: 2023-10-10

## 2023-10-10 LAB
ANION GAP SERPL CALCULATED.3IONS-SCNC: 10 MMOL/L
BASOPHILS # BLD AUTO: 0.05 THOUSANDS/ÂΜL (ref 0–0.1)
BASOPHILS NFR BLD AUTO: 1 % (ref 0–1)
BUN SERPL-MCNC: 30 MG/DL (ref 5–25)
CALCIUM SERPL-MCNC: 9.1 MG/DL (ref 8.4–10.2)
CHLORIDE SERPL-SCNC: 106 MMOL/L (ref 96–108)
CO2 SERPL-SCNC: 20 MMOL/L (ref 21–32)
CREAT SERPL-MCNC: 1.53 MG/DL (ref 0.6–1.3)
EOSINOPHIL # BLD AUTO: 0.17 THOUSAND/ÂΜL (ref 0–0.61)
EOSINOPHIL NFR BLD AUTO: 2 % (ref 0–6)
ERYTHROCYTE [DISTWIDTH] IN BLOOD BY AUTOMATED COUNT: 15.4 % (ref 11.6–15.1)
GFR SERPL CREATININE-BSD FRML MDRD: 39 ML/MIN/1.73SQ M
GLUCOSE SERPL-MCNC: 186 MG/DL (ref 65–140)
GLUCOSE SERPL-MCNC: 199 MG/DL (ref 65–140)
GLUCOSE SERPL-MCNC: 209 MG/DL (ref 65–140)
GLUCOSE SERPL-MCNC: 246 MG/DL (ref 65–140)
GLUCOSE SERPL-MCNC: 280 MG/DL (ref 65–140)
GLUCOSE SERPL-MCNC: 295 MG/DL (ref 65–140)
GLUCOSE SERPL-MCNC: 310 MG/DL (ref 65–140)
GLUCOSE SERPL-MCNC: 326 MG/DL (ref 65–140)
HCT VFR BLD AUTO: 36.3 % (ref 34.8–46.1)
HGB BLD-MCNC: 12.1 G/DL (ref 11.5–15.4)
IMM GRANULOCYTES # BLD AUTO: 0.07 THOUSAND/UL (ref 0–0.2)
IMM GRANULOCYTES NFR BLD AUTO: 1 % (ref 0–2)
LYMPHOCYTES # BLD AUTO: 2.27 THOUSANDS/ÂΜL (ref 0.6–4.47)
LYMPHOCYTES NFR BLD AUTO: 23 % (ref 14–44)
MAGNESIUM SERPL-MCNC: 1.8 MG/DL (ref 1.9–2.7)
MCH RBC QN AUTO: 28.7 PG (ref 26.8–34.3)
MCHC RBC AUTO-ENTMCNC: 33.3 G/DL (ref 31.4–37.4)
MCV RBC AUTO: 86 FL (ref 82–98)
MONOCYTES # BLD AUTO: 0.59 THOUSAND/ÂΜL (ref 0.17–1.22)
MONOCYTES NFR BLD AUTO: 6 % (ref 4–12)
NEUTROPHILS # BLD AUTO: 6.55 THOUSANDS/ÂΜL (ref 1.85–7.62)
NEUTS SEG NFR BLD AUTO: 67 % (ref 43–75)
NRBC BLD AUTO-RTO: 0 /100 WBCS
PHOSPHATE SERPL-MCNC: 3.9 MG/DL (ref 2.7–4.5)
PLATELET # BLD AUTO: 256 THOUSANDS/UL (ref 149–390)
PMV BLD AUTO: 9.7 FL (ref 8.9–12.7)
POTASSIUM SERPL-SCNC: 3.5 MMOL/L (ref 3.5–5.3)
RBC # BLD AUTO: 4.22 MILLION/UL (ref 3.81–5.12)
SODIUM SERPL-SCNC: 136 MMOL/L (ref 135–147)
WBC # BLD AUTO: 9.7 THOUSAND/UL (ref 4.31–10.16)

## 2023-10-10 PROCEDURE — 83735 ASSAY OF MAGNESIUM: CPT

## 2023-10-10 PROCEDURE — 82948 REAGENT STRIP/BLOOD GLUCOSE: CPT

## 2023-10-10 PROCEDURE — 99223 1ST HOSP IP/OBS HIGH 75: CPT | Performed by: INTERNAL MEDICINE

## 2023-10-10 PROCEDURE — 84100 ASSAY OF PHOSPHORUS: CPT

## 2023-10-10 PROCEDURE — 85025 COMPLETE CBC W/AUTO DIFF WBC: CPT

## 2023-10-10 PROCEDURE — 80048 BASIC METABOLIC PNL TOTAL CA: CPT

## 2023-10-10 RX ORDER — INSULIN LISPRO 100 [IU]/ML
1-6 INJECTION, SOLUTION INTRAVENOUS; SUBCUTANEOUS
Status: DISCONTINUED | OUTPATIENT
Start: 2023-10-10 | End: 2023-10-10

## 2023-10-10 RX ORDER — POTASSIUM CITRATE 10 MEQ/1
10 TABLET, EXTENDED RELEASE ORAL
Status: DISCONTINUED | OUTPATIENT
Start: 2023-10-10 | End: 2023-10-11 | Stop reason: HOSPADM

## 2023-10-10 RX ORDER — ACETAMINOPHEN 325 MG/1
650 TABLET ORAL EVERY 6 HOURS PRN
Status: DISCONTINUED | OUTPATIENT
Start: 2023-10-09 | End: 2023-10-10

## 2023-10-10 RX ORDER — HEPARIN SODIUM 5000 [USP'U]/ML
5000 INJECTION, SOLUTION INTRAVENOUS; SUBCUTANEOUS EVERY 8 HOURS SCHEDULED
Status: DISCONTINUED | OUTPATIENT
Start: 2023-10-10 | End: 2023-10-11 | Stop reason: HOSPADM

## 2023-10-10 RX ORDER — METHOCARBAMOL 500 MG/1
500 TABLET, FILM COATED ORAL EVERY 8 HOURS SCHEDULED
Status: DISCONTINUED | OUTPATIENT
Start: 2023-10-10 | End: 2023-10-11 | Stop reason: HOSPADM

## 2023-10-10 RX ORDER — INSULIN GLARGINE 100 [IU]/ML
115 INJECTION, SOLUTION SUBCUTANEOUS
Status: DISCONTINUED | OUTPATIENT
Start: 2023-10-10 | End: 2023-10-11 | Stop reason: HOSPADM

## 2023-10-10 RX ORDER — METHOCARBAMOL 500 MG/1
500 TABLET, FILM COATED ORAL EVERY 6 HOURS PRN
Status: DISCONTINUED | OUTPATIENT
Start: 2023-10-10 | End: 2023-10-10

## 2023-10-10 RX ORDER — INSULIN LISPRO 100 [IU]/ML
1-12 INJECTION, SOLUTION INTRAVENOUS; SUBCUTANEOUS
Status: DISCONTINUED | OUTPATIENT
Start: 2023-10-10 | End: 2023-10-11 | Stop reason: HOSPADM

## 2023-10-10 RX ORDER — OXYCODONE HYDROCHLORIDE 5 MG/1
5 TABLET ORAL EVERY 4 HOURS PRN
Status: DISCONTINUED | OUTPATIENT
Start: 2023-10-09 | End: 2023-10-11 | Stop reason: HOSPADM

## 2023-10-10 RX ORDER — PANTOPRAZOLE SODIUM 40 MG/1
40 TABLET, DELAYED RELEASE ORAL
Status: DISCONTINUED | OUTPATIENT
Start: 2023-10-10 | End: 2023-10-11 | Stop reason: HOSPADM

## 2023-10-10 RX ORDER — ACETAMINOPHEN 325 MG/1
975 TABLET ORAL EVERY 8 HOURS SCHEDULED
Status: DISCONTINUED | OUTPATIENT
Start: 2023-10-10 | End: 2023-10-11 | Stop reason: HOSPADM

## 2023-10-10 RX ORDER — VENLAFAXINE HYDROCHLORIDE 37.5 MG/1
75 CAPSULE, EXTENDED RELEASE ORAL EVERY EVENING
Status: DISCONTINUED | OUTPATIENT
Start: 2023-10-10 | End: 2023-10-11 | Stop reason: HOSPADM

## 2023-10-10 RX ORDER — CLONAZEPAM 0.5 MG/1
0.5 TABLET ORAL 3 TIMES DAILY
Status: DISCONTINUED | OUTPATIENT
Start: 2023-10-10 | End: 2023-10-11 | Stop reason: HOSPADM

## 2023-10-10 RX ORDER — INSULIN GLARGINE 100 [IU]/ML
110 INJECTION, SOLUTION SUBCUTANEOUS
Status: DISCONTINUED | OUTPATIENT
Start: 2023-10-10 | End: 2023-10-10

## 2023-10-10 RX ORDER — CLOTRIMAZOLE 1 %
CREAM (GRAM) TOPICAL 2 TIMES DAILY
Status: DISCONTINUED | OUTPATIENT
Start: 2023-10-10 | End: 2023-10-11 | Stop reason: HOSPADM

## 2023-10-10 RX ORDER — ATENOLOL 25 MG/1
25 TABLET ORAL DAILY
Status: DISCONTINUED | OUTPATIENT
Start: 2023-10-10 | End: 2023-10-11 | Stop reason: HOSPADM

## 2023-10-10 RX ORDER — LOSARTAN POTASSIUM 25 MG/1
25 TABLET ORAL DAILY
Status: DISCONTINUED | OUTPATIENT
Start: 2023-10-10 | End: 2023-10-11 | Stop reason: HOSPADM

## 2023-10-10 RX ORDER — INSULIN LISPRO 100 [IU]/ML
1-11 INJECTION, SOLUTION INTRAVENOUS; SUBCUTANEOUS
Status: DISCONTINUED | OUTPATIENT
Start: 2023-10-10 | End: 2023-10-10

## 2023-10-10 RX ORDER — MICONAZOLE NITRATE 20 MG/G
CREAM TOPICAL 2 TIMES DAILY
Status: DISCONTINUED | OUTPATIENT
Start: 2023-10-10 | End: 2023-10-11 | Stop reason: HOSPADM

## 2023-10-10 RX ORDER — PRAVASTATIN SODIUM 40 MG
40 TABLET ORAL
Status: DISCONTINUED | OUTPATIENT
Start: 2023-10-10 | End: 2023-10-11 | Stop reason: HOSPADM

## 2023-10-10 RX ADMIN — LAMOTRIGINE 225 MG: 100 TABLET ORAL at 08:36

## 2023-10-10 RX ADMIN — ACETAMINOPHEN 975 MG: 325 TABLET, FILM COATED ORAL at 21:51

## 2023-10-10 RX ADMIN — INSULIN LISPRO 1 UNITS: 100 INJECTION, SOLUTION INTRAVENOUS; SUBCUTANEOUS at 15:47

## 2023-10-10 RX ADMIN — PANTOPRAZOLE SODIUM 40 MG: 40 TABLET, DELAYED RELEASE ORAL at 05:35

## 2023-10-10 RX ADMIN — PRAVASTATIN SODIUM 40 MG: 40 TABLET ORAL at 15:46

## 2023-10-10 RX ADMIN — CLONAZEPAM 0.5 MG: 0.5 TABLET ORAL at 21:51

## 2023-10-10 RX ADMIN — INSULIN LISPRO 4 UNITS: 100 INJECTION, SOLUTION INTRAVENOUS; SUBCUTANEOUS at 11:31

## 2023-10-10 RX ADMIN — LAMOTRIGINE 225 MG: 100 TABLET ORAL at 19:40

## 2023-10-10 RX ADMIN — ACETAMINOPHEN 975 MG: 325 TABLET, FILM COATED ORAL at 15:46

## 2023-10-10 RX ADMIN — MICONAZOLE NITRATE: 20 CREAM TOPICAL at 22:25

## 2023-10-10 RX ADMIN — LOSARTAN POTASSIUM 25 MG: 25 TABLET, FILM COATED ORAL at 08:35

## 2023-10-10 RX ADMIN — VENLAFAXINE HYDROCHLORIDE 75 MG: 37.5 CAPSULE, EXTENDED RELEASE ORAL at 18:29

## 2023-10-10 RX ADMIN — OXYCODONE HYDROCHLORIDE 5 MG: 5 TABLET ORAL at 16:22

## 2023-10-10 RX ADMIN — INSULIN GLARGINE 115 UNITS: 100 INJECTION, SOLUTION SUBCUTANEOUS at 21:42

## 2023-10-10 RX ADMIN — POTASSIUM CITRATE 10 MEQ: 10 TABLET, EXTENDED RELEASE ORAL at 11:30

## 2023-10-10 RX ADMIN — INSULIN GLARGINE 110 UNITS: 100 INJECTION, SOLUTION SUBCUTANEOUS at 02:29

## 2023-10-10 RX ADMIN — PIPERACILLIN AND TAZOBACTAM 3.38 G: 36; 4.5 INJECTION, POWDER, FOR SOLUTION INTRAVENOUS at 02:08

## 2023-10-10 RX ADMIN — INSULIN LISPRO 5 UNITS: 100 INJECTION, SOLUTION INTRAVENOUS; SUBCUTANEOUS at 01:36

## 2023-10-10 RX ADMIN — CLONAZEPAM 0.5 MG: 0.5 TABLET ORAL at 01:35

## 2023-10-10 RX ADMIN — ATENOLOL 25 MG: 25 TABLET ORAL at 08:35

## 2023-10-10 RX ADMIN — INSULIN LISPRO 11 UNITS: 100 INJECTION, SOLUTION INTRAVENOUS; SUBCUTANEOUS at 22:25

## 2023-10-10 RX ADMIN — PIPERACILLIN AND TAZOBACTAM 3.38 G: 36; 4.5 INJECTION, POWDER, FOR SOLUTION INTRAVENOUS at 08:45

## 2023-10-10 RX ADMIN — CLONAZEPAM 0.5 MG: 0.5 TABLET ORAL at 08:44

## 2023-10-10 RX ADMIN — INSULIN LISPRO 1 UNITS: 100 INJECTION, SOLUTION INTRAVENOUS; SUBCUTANEOUS at 07:55

## 2023-10-10 RX ADMIN — POTASSIUM CITRATE 10 MEQ: 10 TABLET, EXTENDED RELEASE ORAL at 07:52

## 2023-10-10 RX ADMIN — CLONAZEPAM 0.5 MG: 0.5 TABLET ORAL at 15:46

## 2023-10-10 RX ADMIN — POTASSIUM CITRATE 10 MEQ: 10 TABLET, EXTENDED RELEASE ORAL at 15:46

## 2023-10-10 NOTE — ASSESSMENT & PLAN NOTE
· Patient presented with subjective fevers/chills, dysuria and bilateral flank pain more so on the left than right  · She has a history of bladder cancer status s/p cystectomy with ileal conduit  · She said her recent UC from [facility] grew Enterococcus on Levaquin but was not getting better  · She was started on IV Zosyn in the ED  · 10/9 UA was positive for leukocytosis  · 10/9 CBC-WBC 10.9  · Given recurrent history of UTIs we will keep her on broad-spectrum antibiotics Zosyn Q6H -follow-up on cultures  · Follow-up on labs and pain management  · ID consulted-recommendations appreciated

## 2023-10-10 NOTE — CONSULTS
Consultation - Infectious Disease   [place] President 50 y.o. female MRN: [id number]  Unit/Bed#: S -01 Encounter: [id number]    Will discuss with attending. Please see attending attestation for any further recommendations and/or changes to plan. IMPRESSION & RECOMMENDATIONS:   1. Bilateral flank pain  · Labs notable for mild leukocytosis 10.23, trending down. Lactic acid 2.0  · UA notable for 4-10 WBC with occasional bacteria. · Prior admissions:   · 7/14: Urine culture positive for Proteus, resistant to nitrofurantoin. Cipro 500 mg, for 7 days. Diflucan 150 mg, 1 dose. CTA abdomen pelvis no evidence of hydronephrosis or pyelonephritis. · 8/28: Urine culture grew pan sensitive Enterococcus faecalis, Stenotrophomonas Maltophilia. CTA abdomen pelvis revealed mild cortical scarring throughout both kidneys. No nephrolithiasis or hydronephrosis and no evidence of acute pyelonephritis. · 9/12: Cultures grew pan sensitive Enterococcus faecalis was discharged on Macrobid. · 9/18: Urine & UC was clean. CTA abdomen pelvis no hydronephrosis or urinary tract calculus. Bilateral renal subcentimeter hypodensities too small to accurately characterize. · [facility] ([facility]): UC grew pansensitive Enterococcus Faecalis (2 doses of cephalexin + Diflucan) changed to Levofloxacin on 10/9 but patient has not taken any doses of it yet. In the ED, she was given a dose of Zosyn. · Suspect bilateral flank pain likely musculoskeletal in origin as patient had paraspinal tenderness on examination. Prior CTA abdomen/pelvis images have been negative for pyelonephritis and patient does not display systemic signs of infection. Urine cultures have grown Enterococcus faecalis on multiple occasions, suspect colonization and does not need to be treated with antibiotics. Plan:  · We will discontinue antibiotics. Monitor off antibiotics.     2. Vaginitis  · POA with redness/itching/burning around the urethra, history of prior fungal infections. · Suspect recurrent fungal infections due to repeated use of antibiotics. · Continue with clotrimazole 1% cream.     3. Uncontrolled type II DM  · A1c: 10.8  · Management per primary team      Antibiotics: None    Thank you for this consultation. We will follow along with you. HISTORY OF PRESENT ILLNESS:  Reason for Consult: UTI, Pyelonephritis     HPI: Duke Siemens is a 50 y.o. female with past medical history of HTN, HL, GERD, T2DM, CKD stage 3, bladder cancer s/p cystectomy with ileal conduit (2019) presented concerns for ongoing UTI given recent chills and constant dull, achy bilateral flank pain (L>R) ongoing for the past 2 months. The pain sometimes wraps around anteriorly to the abdomen and feels like someone kicked her. It did improve previously with antibiotics. Patient reports improvement in her flank pain this morning since admisison. She also reports burning pain around the urethra and labia which was present during her previous admissions. She has received pudendal nerve blocks for dyspareunia in the past. She has taken Keflex 2 doses, was switched to Levaquin yesterday but has not taken any doses. In the ED, patient received Zosyn. REVIEW OF SYSTEMS:  Positive: Flank pain, chills  A complete system-based review of systems is otherwise negative.     PAST MEDICAL HISTORY:  Past Medical History:   Diagnosis Date   • Anxiety    • Arthritis of lumbar spine    • Asthma     "mild"   • Bilateral dry eyes    • Bilateral shoulder pain     with right arm pain from neck   • Bipolar disorder (HCC)    • Bladder CA in situ    • Bulging of cervical intervertebral disc     C7 and C8   • Bursitis of hip, right    • Cancer (HCC)     bladder   • Carpal tunnel syndrome, bilateral    • Cataract     right   • Cervical spine arthritis    • Chronic pain disorder    • Colitis    • Cystitis, chronic    • Dental crown present    • Depression    • Deviated septum    • Diabetes mellitus ([address])    • Family history of factor V Leiden mutation     Mom has;  pt reports " I tested negative"   • Fatty liver    • GERD (gastroesophageal reflux disease)    • Grinding teeth    • High cholesterol    • History of heart block     "from reaction to Lithium"   • History of recent fall     3 weeks ago or so fell on ice   • HPV (human papilloma virus) infection     "dormant"   • Hypertension    • Irritable bowel syndrome    • Left knee injury     "years ago"   • Nicotine dependence    • Psychiatric disorder     Panic disorder/none in a long time   • Rotator cuff tendinitis     bilat   • Seizures ([address])     "diagnosed with tonic/clonic seizures from coming off lithium but since on lamictal no seizure x 10 years"   • Shortness of breath     mild   • Sinus infection     saw Dr Gala Gutierrez  and is taking antibiotics   • Wears glasses      Past Surgical History:   Procedure Laterality Date   • BLADDER SURGERY N/A    • CHOLECYSTECTOMY     • ESOPHAGOGASTRODUODENOSCOPY     • GYNECOLOGIC CRYOSURGERY      "for HPV"   • INDUCED       "age 25"   • INSTILLATION MYTOMYCIN N/A 2019    Procedure: INSTILLATION MITOMYCIN;  Surgeon: Seema Salazar MD;  Location: [facility];  Service: Urology   • [place] CYSTO W/REMOVAL OF LESIONS SMALL N/A 2019    Procedure: TRANSURETHRAL RESECTION OF BLADDER TUMOR (TURBT);   Surgeon: Seema Salazar MD;  Location: [facility];  Service: Urology   • URETHRA SURGERY         • WISDOM TOOTH EXTRACTION         FAMILY HISTORY:  Non-contributory    SOCIAL HISTORY:  Social History     Substance and Sexual Activity   Alcohol Use No     Social History     Substance and Sexual Activity   Drug Use No     Social History     Tobacco Use   Smoking Status Former   • Packs/day: 1.50   • Years: 16.00   • Total pack years: 24.00   • Types: Cigarettes   • Quit date: 10/31/2019   • Years since quitting: 3.9   Smokeless Tobacco Never   Tobacco Comments    has been trying to quit       ALLERGIES:  Allergies   Allergen Reactions   • [address] Live Other (See Comments)     Other reaction(s): Other (See Comments)   "After placed in bladder Caused sepsis and landed in hospital"   "After placed in bladder Caused sepsis and landed in hospital"   "After placed in bladder Caused sepsis and landed in hospital"   • Bcg Live Other (See Comments)      "After placed in bladder Caused sepsis and landed in hospital"   • Fentanyl Shortness Of Breath     "okay with morphine"   • Lithium Other (See Comments)     Other reaction(s): Other (See Comments)  heart block  Heart block   • Depakote [Valproic Acid] Other (See Comments)     Breast enlargement   • Divalproex Sodium    • Influenza Vaccines Swelling     Other reaction(s): Other (See Comments)  GASTROENTERITIS  Arm swelling   • Propoxyphene Hallucinations   • Singulair [Montelukast] Hives   • Topamax [Topiramate] Other (See Comments)     Kidney stones   • Atorvastatin Rash   • Loratadine Rash       MEDICATIONS:  All current active medications have been reviewed. PHYSICAL EXAM:  Vitals:  Temp:  [97.9 °F (36.6 °C)-98.7 °F (37.1 °C)] 97.9 °F (36.6 °C)  HR:  [] 90  Resp:  [16-20] 18  BP: (114-142)/(59-79) 132/66  SpO2:  [95 %-98 %] 96 %  Temp (24hrs), Av.2 °F (36.8 °C), Min:97.9 °F (36.6 °C), Max:98.7 °F (37.1 °C)  Current: Temperature: 97.9 °F (36.6 °C)     Physical Exam:  General:  Well-nourished, well-developed, in no acute distress  Eyes:  Conjunctive clear with no hemorrhages or effusions  Oropharynx:  No ulcers, no lesions  Neck:  Supple, no lymphadenopathy  Lungs:  Clear to auscultation bilaterally, no accessory muscle use  Cardiac:  Regular rate and rhythm, no murmurs  Abdomen:  Soft, non-tender, non-distended  Extremities:  No peripheral cyanosis, clubbing, or edema  Skin:  No rashes, no ulcers  Neurological:  Moves all four extremities spontaneously, sensation grossly intact  MSK: Bilateral paraspinal tenderness on palpation.       LABS, IMAGING, & OTHER STUDIES:  Lab Results:  I have personally reviewed pertinent labs. Results from last 7 days   Lab Units 10/10/23  0442 10/09/23  1848   POTASSIUM mmol/L 3.5 4.0   CHLORIDE mmol/L 106 103   CO2 mmol/L 20* 22   BUN mg/dL 30* 28*   CREATININE mg/dL 1.53* 1.49*   EGFR ml/min/1.73sq m 39 41   CALCIUM mg/dL 9.1 9.4   AST U/L  --  17   ALT U/L  --  21   ALK PHOS U/L  --  77     Results from last 7 days   Lab Units 10/10/23  0442 10/09/23  1848   WBC Thousand/uL 9.70 10.23*   HEMOGLOBIN g/dL 12.1 12.3   PLATELETS Thousands/uL 256 262             Imaging Studies:   I have personally reviewed pertinent imaging study reports and images in PACS. EKG, Pathology, and Other Studies:   I have personally reviewed pertinent reports.

## 2023-10-10 NOTE — ASSESSMENT & PLAN NOTE
Lab Results   Component Value Date    HGBA1C 10.8 (H) 07/16/2023       No results for input(s): "POCGLU" in the last 72 hours. Blood Sugar Average: Last 72 hrs:  Held patients Trulicity and metformin.   Currently on sliding scale insulin

## 2023-10-10 NOTE — ASSESSMENT & PLAN NOTE
-Patient was complaining of some redness/itching near her groin area-has history of fungal infections  -Started the patient on clotrimazole-follow-up

## 2023-10-10 NOTE — ED PROVIDER NOTES
History  Chief Complaint   Patient presents with   • Possible UTI     Pt presents to the ED with reports of UTI symptoms. States she has been on multiple rx regimens over the last 2 months. 49 y/o F hx of illeal conduit presents w cc of flank pain and fevers. Reports gen weakness. Currently on levaquin for UTI but not getting better. No n/v. History provided by:  Patient      Prior to Admission Medications   Prescriptions Last Dose Informant Patient Reported? Taking? COLESTIPOL HCL PO   Yes No   Cholecalciferol (VITAMIN D3 GUMMIES ADULT PO)   Yes No   Continuous Blood Gluc  (FreeStyle Laureen 2 Daggett) EDSON   Yes No   Si EACH BY MISCELLANEOUS ROUTE ONE TIME FOR 1 DOSE. Patient not taking: Reported on 2023   Continuous Blood Gluc Sensor (FreeStyle Laureen 2 Sensor) MISC   Yes No   Si EACH BY MISCELLANEOUS ROUTE EVERY 14 (FOURTEEN) DAYS. CHANGE EVERY 14 DAYS. E11.65   Patient not taking: Reported on 2023   Dulaglutide (Trulicity) 2.22 YJ/7.8ZX SOPN   Yes No   Sig: Inject 0.75 mg under the skin Once a week   Tresiba FlexTouch 200 units/mL CONCENTRATED U-200 injection pen  Self Yes No   Sig: INJECT 140 UNITS UNDER THE SKIN DAILY. atenolol (TENORMIN) 25 mg tablet  Self Yes No   Si daily   azelastine (ASTELIN) 0.1 % nasal spray  Self Yes No   Si spray into each nostril   clonazePAM (KlonoPIN) 0.5 mg tablet  Self Yes No   Sig: Take 0.5 mg by mouth 3 (three) times a day   clotrimazole (LOTRIMIN) 1 % cream  Self Yes No   Sig: Apply topically as needed   Patient not taking: Reported on 2023   clotrimazole-betamethasone (LOTRISONE) 1-0.05 % cream  Self Yes No   Sig: APPLY TO AFFECTED AREA(S) AS DIRECTED   Patient not taking: Reported on 2023   ferrous sulfate 325 (65 Fe) mg tablet   Yes No   Sig: TAKE 1 TABLET BY MOUTH EVERY DAY WITH BREAKFAST. ..  OTC   flavoxATE (URISPAS) 100 mg tablet   No No   Sig: Take 1 tablet (100 mg total) by mouth 3 (three) times a day as needed for bladder spasms   fluticasone (FLONASE) 50 mcg/act nasal spray  Self Yes No   Si sprays into each nostril 2 (two) times a day   glucose blood test strip  Self Yes No   Sig: Freestyle Lite test strips. Test 8XD. Patient not taking: Reported on 2023   lamoTRIgine (LaMICtal) 200 MG tablet  Self No No   Sig: TAKE 1 TABLET TWICE DAILY. Patient not taking: Reported on 2023   lamoTRIgine (LaMICtal) 25 mg tablet  Self No No   Sig: TAKE 1 (25MG) TABLET BY MOUTH TWICE A DAY IN ADDITION TO 200MG TWICE DAILY   losartan (COZAAR) 25 mg tablet   Yes No   Sig: TAKE 1 TABLET BY MOUTH 1 TIME EACH DAY. medroxyPROGESTERone acetate (DEPO-PROVERA SYRINGE) 150 mg/mL injection   Yes No   Sig: INJECT 150 MG INJECT INTO THE MUSCLE EVERY 3 (THREE) MONTHS.    metFORMIN (GLUCOPHAGE-XR) 500 mg 24 hr tablet  Self Yes No   Sig: Take 1,000 mg by mouth 2 (two) times a day     omeprazole (PriLOSEC) 40 MG capsule   Yes No   ondansetron (ZOFRAN-ODT) 4 mg disintegrating tablet   Yes No   Sig: as needed   Patient not taking: Reported on 2023   oxyCODONE (ROXICODONE) 5 immediate release tablet   Yes No   Sig: Take 5 mg by mouth   potassium citrate (UROCIT-K) 10 mEq   Yes No   Sig: PLEASE SEE ATTACHED FOR DETAILED DIRECTIONS   pravastatin (PRAVACHOL) 40 mg tablet  Self Yes No   Sig: Take 40 mg by mouth   venlafaxine (EFFEXOR-XR) 75 mg 24 hr capsule  Self Yes No   Sig: Take 75 mg by mouth every evening      Facility-Administered Medications: None       Past Medical History:   Diagnosis Date   • Anxiety    • Arthritis of lumbar spine    • Asthma     "mild"   • Bilateral dry eyes    • Bilateral shoulder pain     with right arm pain from neck   • Bipolar disorder (HCC)    • Bladder CA in situ    • Bulging of cervical intervertebral disc     C7 and C8   • Bursitis of hip, right    • Cancer [facility])     bladder   • Carpal tunnel syndrome, bilateral    • Cataract     right   • Cervical spine arthritis    • Chronic pain disorder    • Colitis    • Cystitis, chronic    • Dental crown present    • Depression    • Deviated septum    • Diabetes mellitus ([address])    • Family history of factor V Leiden mutation     Mom has;  pt reports " I tested negative"   • Fatty liver    • GERD (gastroesophageal reflux disease)    • Grinding teeth    • High cholesterol    • History of heart block     "from reaction to Lithium"   • History of recent fall     3 weeks ago or so fell on ice   • HPV (human papilloma virus) infection     "dormant"   • Hypertension    • Irritable bowel syndrome    • Left knee injury     "years ago"   • Nicotine dependence    • Psychiatric disorder     Panic disorder/none in a long time   • Rotator cuff tendinitis     bilat   • Seizures ([address])     "diagnosed with tonic/clonic seizures from coming off lithium but since on lamictal no seizure x 10 years"   • Shortness of breath     mild   • Sinus infection     saw Dr Francisco J Ortiz  and is taking antibiotics   • Wears glasses        Past Surgical History:   Procedure Laterality Date   • BLADDER SURGERY N/A    • CHOLECYSTECTOMY     • ESOPHAGOGASTRODUODENOSCOPY     • GYNECOLOGIC CRYOSURGERY      "for HPV"   • INDUCED       "age 25"   • INSTILLATION MYTOMYCIN N/A 2019    Procedure: INSTILLATION MITOMYCIN;  Surgeon: Hank Cooney MD;  Location: [facility];  Service: Urology   • [place] CYSTO W/REMOVAL OF LESIONS SMALL N/A 2019    Procedure: TRANSURETHRAL RESECTION OF BLADDER TUMOR (TURBT);   Surgeon: Hank Cooney MD;  Location: [facility];  Service: Urology   • URETHRA SURGERY         • WISDOM TOOTH EXTRACTION         Family History   Problem Relation Age of Onset   • Aneurysm Father    • Vascular Disease Mother    • Depression Mother    • Anxiety disorder Mother    • Alzheimer's disease Paternal Grandmother    • Prostate cancer Paternal Grandfather    • Parkinsonism Paternal Grandfather    • Bipolar disorder Brother    • Diabetes Family      I have reviewed and agree with the history as documented. E-Cigarette/Vaping   • E-Cigarette Use Never User      E-Cigarette/Vaping Substances   • Nicotine No    • THC No    • CBD No    • Flavoring No    • Other No    • Unknown No      Social History     Tobacco Use   • Smoking status: Former     Packs/day: 1.50     Years: 16.00     Total pack years: 24.00     Types: Cigarettes     Quit date: 10/31/2019     Years since quitting: 3.9   • Smokeless tobacco: Never   • Tobacco comments:     has been trying to quit   Vaping Use   • Vaping Use: Never used   Substance Use Topics   • Alcohol use: No   • Drug use: No       Review of Systems   Constitutional: Negative for chills and fever. HENT: Negative for ear pain and sore throat. Eyes: Negative for pain and visual disturbance. Respiratory: Negative for cough and shortness of breath. Cardiovascular: Negative for chest pain and palpitations. Gastrointestinal: Negative for abdominal pain and vomiting. Endocrine: Negative for cold intolerance and heat intolerance. Genitourinary: Negative for hematuria. Musculoskeletal: Negative for arthralgias, back pain and joint swelling. Skin: Negative for color change and rash. Allergic/Immunologic: Negative for environmental allergies. Neurological: Negative for dizziness, seizures and syncope. Hematological: Negative for adenopathy. Psychiatric/Behavioral: Negative for agitation, behavioral problems and confusion. All other systems reviewed and are negative. Physical Exam  Physical Exam  Vitals and nursing note reviewed. Constitutional:       General: She is not in acute distress. Appearance: She is well-developed. HENT:      Head: Normocephalic and atraumatic. Eyes:      Conjunctiva/sclera: Conjunctivae normal.   Cardiovascular:      Rate and Rhythm: Normal rate and regular rhythm. Heart sounds: No murmur heard. Pulmonary:      Effort: Pulmonary effort is normal. No respiratory distress.       Breath sounds: Normal breath sounds. Abdominal:      Palpations: Abdomen is soft. Tenderness: There is no abdominal tenderness. Musculoskeletal:         General: No swelling. Cervical back: Neck supple. Skin:     General: Skin is warm and dry. Capillary Refill: Capillary refill takes less than 2 seconds. Neurological:      Mental Status: She is alert. Psychiatric:         Mood and Affect: Mood normal.         Vital Signs  ED Triage Vitals   Temperature Pulse Respirations Blood Pressure SpO2   10/09/23 1814 10/09/23 1814 10/09/23 1814 10/09/23 1814 10/09/23 1814   98.7 °F (37.1 °C) 102 20 142/77 97 %      Temp Source Heart Rate Source Patient Position - Orthostatic VS BP Location FiO2 (%)   10/09/23 1814 10/09/23 1814 10/09/23 1814 10/09/23 1814 --   Oral Monitor Sitting Right arm       Pain Score       10/09/23 1948       6           Vitals:    10/09/23 1814 10/09/23 2041   BP: 142/77 114/59   Pulse: 102 89   Patient Position - Orthostatic VS: Sitting Lying         Visual Acuity      ED Medications  Medications   piperacillin-tazobactam (ZOSYN) 3.375 g in sodium chloride 0.9 % 100 mL IVPB (0 g Intravenous Stopped 10/9/23 2016)       Diagnostic Studies  Results Reviewed     Procedure Component Value Units Date/Time    Lactic acid, plasma (w/reflex if result > 2.0) [[id number]]  (Normal) Collected: 10/09/23 1848    Lab Status: Final result Specimen: Blood from Arm, Right Updated: 10/09/23 1926     LACTIC ACID 2.0 mmol/L     Narrative:      Result may be elevated if tourniquet was used during collection.     Comprehensive metabolic panel [[id number]]  (Abnormal) Collected: 10/09/23 1848    Lab Status: Final result Specimen: Blood from Arm, Right Updated: 10/09/23 1926     Sodium 135 mmol/L      Potassium 4.0 mmol/L      Chloride 103 mmol/L      CO2 22 mmol/L      ANION GAP 10 mmol/L      BUN 28 mg/dL      Creatinine 1.49 mg/dL      Glucose 297 mg/dL      Calcium 9.4 mg/dL      AST 17 U/L      ALT 21 U/L      Alkaline Phosphatase 77 U/L      Total Protein 7.3 g/dL      Albumin 4.4 g/dL      Total Bilirubin 0.35 mg/dL      eGFR 41 ml/min/1.73sq m     Narrative:      [place] guidelines for Chronic Kidney Disease (CKD):   •  Stage 1 with normal or high GFR (GFR > 90 mL/min/1.73 square meters)  •  Stage 2 Mild CKD (GFR = 60-89 mL/min/1.73 square meters)  •  Stage 3A Moderate CKD (GFR = 45-59 mL/min/1.73 square meters)  •  Stage 3B Moderate CKD (GFR = 30-44 mL/min/1.73 square meters)  •  Stage 4 Severe CKD (GFR = 15-29 mL/min/1.73 square meters)  •  Stage 5 End Stage CKD (GFR <15 mL/min/1.73 square meters)  Note: GFR calculation is accurate only with a steady state creatinine    Urine Microscopic [[id number]]  (Abnormal) Collected: 10/09/23 1845    Lab Status: Final result Specimen: Urine, Right Nephrostomy Updated: 10/09/23 1912     RBC, UA 1-2 /hpf      WBC, UA 4-10 /hpf      Epithelial Cells Occasional /hpf      Bacteria, UA Occasional /hpf      MUCUS THREADS Occasional     Hyaline Casts, UA 0-3 /lpf     UA w Reflex to Microscopic w Reflex to Culture [[id number]]  (Abnormal) Collected: 10/09/23 1845    Lab Status: Final result Specimen: Urine, Right Nephrostomy Updated: 10/09/23 1908     Color, UA Light Yellow     Clarity, UA Turbid     Specific Gravity, UA 1.014     pH, UA 6.5     Leukocytes, UA Small     Nitrite, UA Negative     Protein, UA 70 (1+) mg/dl      Glucose, UA Negative mg/dl      Ketones, UA Negative mg/dl      Urobilinogen, UA <2.0 mg/dl      Bilirubin, UA Negative     Occult Blood, UA Moderate    CBC and differential [[id number]]  (Abnormal) Collected: 10/09/23 1848    Lab Status: Final result Specimen: Blood from Arm, Right Updated: 10/09/23 1906     WBC 10.23 Thousand/uL      RBC 4.44 Million/uL      Hemoglobin 12.3 g/dL      Hematocrit 37.9 %      MCV 85 fL      MCH 27.7 pg      MCHC 32.5 g/dL      RDW 15.1 %      MPV 9.5 fL      Platelets 462 Thousands/uL      nRBC 0 /100 WBCs Neutrophils Relative 73 %      Immat GRANS % 1 %      Lymphocytes Relative 18 %      Monocytes Relative 6 %      Eosinophils Relative 1 %      Basophils Relative 1 %      Neutrophils Absolute 7.48 Thousands/µL      Immature Grans Absolute 0.08 Thousand/uL      Lymphocytes Absolute 1.87 Thousands/µL      Monocytes Absolute 0.62 Thousand/µL      Eosinophils Absolute 0.13 Thousand/µL      Basophils Absolute 0.05 Thousands/µL                  No orders to display              Procedures  Procedures         ED Course      Pt here w pyelonephritis symptoms. IV zosyn given. Will admit for further management and care. Medical Decision Making  Pt here w pyelonephritis symptoms. IV zosyn given. Will admit for further management and care. Amount and/or Complexity of Data Reviewed  External Data Reviewed: labs. Labs: ordered. Risk  Decision regarding hospitalization. Disposition  Final diagnoses:   UTI (urinary tract infection)     Time reflects when diagnosis was documented in both MDM as applicable and the Disposition within this note     Time User Action Codes Description Comment    10/9/2023 10:19 PM Jeronimo Espinoza Add [N39.0] UTI (urinary tract infection)       ED Disposition     ED Disposition   Admit    Condition   Stable    Date/Time   Mon Oct 9, 2023 10:15 PM    Comment   Case was discussed with JOSE ANTONIO and the patient's admission status was agreed to be Admission Status: inpatient status to the service of Dr. Damaris Cuevas . Follow-up Information    None         Patient's Medications   Discharge Prescriptions    No medications on file       No discharge procedures on file.     PDMP Review     None          ED Provider  Electronically Signed by           Jeronimo Espinoza DO  10/09/23 1861

## 2023-10-10 NOTE — ED NOTES
Transported patient to [place], receiving PCA was at bedside and receiving RN Antonia Sparks) was notified of arrival via [employer].      Carina Jane  10/09/23 3574

## 2023-10-10 NOTE — H&P
[address] [facility]  H&P  Name: Iza Bruno 50 y.o. female I MRN: [id number]  Unit/Bed#: S -01 I Date of Admission: 10/9/2023   Date of Service: 10/10/2023 I Hospital Day: 1      Assessment/Plan   * Pyelonephritis  Assessment & Plan  · Patient presented with subjective fevers/chills, dysuria and bilateral flank pain more so on the left than right  · She has a history of bladder cancer status s/p cystectomy with ileal conduit  · She said her recent UC from [facility] grew Enterococcus on Levaquin but was not getting better  · She was started on IV Zosyn in the ED  · 10/9 UA was positive for leukocytosis  · 10/9 CBC-WBC 10.9  · Given recurrent history of UTIs we will keep her on broad-spectrum antibiotics Zosyn Q6H -follow-up on cultures  · Follow-up on labs and pain management  · ID consulted-recommendations appreciated    Uncontrolled type 2 diabetes mellitus with hyperglycemia ([address])  Assessment & Plan  Lab Results   Component Value Date    HGBA1C 10.8 (H) 07/16/2023       No results for input(s): "POCGLU" in the last 72 hours. Blood Sugar Average: Last 72 hrs:  Held patients Trulicity and metformin. Currently on sliding scale insulin    Erythema of groin  Assessment & Plan  -Patient was complaining of some redness/itching near her groin area-has history of fungal infections  -Started the patient on clotrimazole-follow-up    Hyperlipidemia  Assessment & Plan  Continue patient's home dose of pravastatin 40 mg    Essential hypertension  Assessment & Plan  Continue her home medications of losartan 25 mg and atenolol 25 mg    Simple partial seizures (HCC)  Assessment & Plan  Continue patient's home dose clonazepam 0.5 mg TID and Lamictal to 225 mg twice daily       VTE Pharmacologic Prophylaxis:   Moderate Risk (Score 3-4) - Pharmacological DVT Prophylaxis Ordered: heparin.   Code Status: Level 1 - Full Code   Discussion with family: Updated  () at bedside. Anticipated Length of Stay: Patient will be admitted on an inpatient basis with an anticipated length of stay of greater than 2 midnights secondary to  . Chief Complaint: Flank pain    History of Present Illness:  Paramjit Malone is a 14-year-old female with a past medical history of hypertension, hyperlipidemia, GERD, type 2 diabetes mellitus, seizures and bladder cancer status post cystectomy with ileal conduit presenting for evaluation due to concerns for UTI complicated with possible pyelonephritis. Patient said she has had multiple UTIs in the past and was seen by [facility] and was started on Macrobid and Levaquin which has not resolved her symptoms. She expresses concerns for ongoing UTI given her recent fever/chills along with complaints of dysuria, itching around her perineum and bilateral flank pain more so on the left than right. She also has a history of yeast infections for which she has taken Diflucan in the past.  She also endorses foul-smelling urine. Otherwise she denies headache, lightheadedness/dizziness, chest pain, shortness of breath, nausea/vomiting/diarrhea, change in her urine output/color from bladder conduit/hematuria. Review of Systems:  Review of Systems   Constitutional: Positive for chills and fever. HENT: Negative for ear pain and sore throat. Eyes: Negative for pain and visual disturbance. Respiratory: Negative for cough and shortness of breath. Cardiovascular: Negative for chest pain and palpitations. Gastrointestinal: Positive for abdominal pain. Negative for vomiting. Genitourinary: Positive for flank pain. Negative for dysuria and hematuria. Musculoskeletal: Negative for arthralgias and back pain. Skin: Negative for color change and rash. Neurological: Negative for seizures and syncope. All other systems reviewed and are negative.       Past Medical and Surgical History:   Past Medical History:   Diagnosis Date   • Anxiety    • Arthritis of lumbar spine    • Asthma     "mild"   • Bilateral dry eyes    • Bilateral shoulder pain     with right arm pain from neck   • Bipolar disorder (HCC)    • Bladder CA in situ    • Bulging of cervical intervertebral disc     C7 and C8   • Bursitis of hip, right    • Cancer (HCC)     bladder   • Carpal tunnel syndrome, bilateral    • Cataract     right   • Cervical spine arthritis    • Chronic pain disorder    • Colitis    • Cystitis, chronic    • Dental crown present    • Depression    • Deviated septum    • Diabetes mellitus ([address])    • Family history of factor V Leiden mutation     Mom has;  pt reports " I tested negative"   • Fatty liver    • GERD (gastroesophageal reflux disease)    • Grinding teeth    • High cholesterol    • History of heart block     "from reaction to Lithium"   • History of recent fall     3 weeks ago or so fell on ice   • HPV (human papilloma virus) infection     "dormant"   • Hypertension    • Irritable bowel syndrome    • Left knee injury     "years ago"   • Nicotine dependence    • Psychiatric disorder     Panic disorder/none in a long time   • Rotator cuff tendinitis     bilat   • Seizures ([address])     "diagnosed with tonic/clonic seizures from coming off lithium but since on lamictal no seizure x 10 years"   • Shortness of breath     mild   • Sinus infection     saw Dr Deandra Ferrari  and is taking antibiotics   • Wears glasses        Past Surgical History:   Procedure Laterality Date   • BLADDER SURGERY N/A    • CHOLECYSTECTOMY     • ESOPHAGOGASTRODUODENOSCOPY     • GYNECOLOGIC CRYOSURGERY      "for HPV"   • INDUCED       "age 25"   • INSTILLATION MYTOMYCIN N/A 2019    Procedure: INSTILLATION MITOMYCIN;  Surgeon: João Mendoza MD;  Location: [facility];  Service: Urology   • VA CYSTO W/REMOVAL OF LESIONS SMALL N/A 2019    Procedure: TRANSURETHRAL RESECTION OF BLADDER TUMOR (TURBT);   Surgeon: João Mendoza MD;  Location: [facility];  Service: Urology   • [address] [place] [address] • WISDOM TOOTH EXTRACTION         Meds/Allergies:  Prior to Admission medications    Medication Sig Start Date End Date Taking? Authorizing Provider   atenolol (TENORMIN) 25 mg tablet 1 daily 10/15/18   Historical Provider, MD   azelastine (ASTELIN) 0.1 % nasal spray 1 spray into each nostril 2/21/19   Historical Provider, MD   Cholecalciferol (VITAMIN D3 GUMMIES ADULT PO)     Historical Provider, MD   clonazePAM (KlonoPIN) 0.5 mg tablet Take 0.5 mg by mouth 3 (three) times a day    Historical Provider, MD   clotrimazole (LOTRIMIN) 1 % cream Apply topically as needed  Patient not taking: Reported on 5/12/2023    Historical Provider, MD   clotrimazole-betamethasone (Elwyn Ducking) 1-0.05 % cream APPLY TO AFFECTED AREA(S) AS DIRECTED  Patient not taking: Reported on 5/12/2023 3/10/21   Historical Provider, MD   COLESTIPOL HCL PO     Historical Provider, MD   Continuous Blood Gluc  (FreeStyle Laureen 2 [place]) EDSON 1 EACH BY MISCELLANEOUS ROUTE ONE TIME FOR 1 DOSE. Patient not taking: Reported on 5/12/2023 4/13/23   Historical Provider, MD   Continuous Blood Gluc Sensor (FreeStyle Laureen 2 Sensor) MISC 1 EACH BY MISCELLANEOUS ROUTE EVERY 14 (FOURTEEN) DAYS. CHANGE EVERY 14 DAYS. E11.65  Patient not taking: Reported on 5/12/2023 4/13/23   Historical Provider, MD   Dulaglutide (Trulicity) 6.10 JA/4.1BZ SOPN Inject 0.75 mg under the skin Once a week 10/13/22   Historical Provider, MD   ferrous sulfate 325 (65 Fe) mg tablet TAKE 1 TABLET BY MOUTH EVERY DAY WITH BREAKFAST. .. OTC 8/9/22   Historical Provider, MD   flavoxATE (URISPAS) 100 mg tablet Take 1 tablet (100 mg total) by mouth 3 (three) times a day as needed for bladder spasms 9/12/23   ERIN Lang   fluticasone (FLONASE) 50 mcg/act nasal spray 2 sprays into each nostril 2 (two) times a day    Historical Provider, MD   glucose blood test strip Freestyle Lite test strips. Test 8XD.   Patient not taking: Reported on 5/12/2023 12/29/16   Historical Provider, MD   lamoTRIgine (LaMICtal) 200 MG tablet TAKE 1 TABLET TWICE DAILY. Patient not taking: Reported on 5/12/2023 3/26/19   Barbara Rojo PA-C   lamoTRIgine (LaMICtal) 25 mg tablet TAKE 1 (25MG) TABLET BY MOUTH TWICE A DAY IN ADDITION TO 200MG TWICE DAILY 2/12/19   Barbara Rojo PA-C   losartan (COZAAR) 25 mg tablet TAKE 1 TABLET BY MOUTH 1 TIME EACH DAY. 1/27/23   Historical Provider, MD   medroxyPROGESTERone acetate (DEPO-PROVERA SYRINGE) 150 mg/mL injection INJECT 150 MG INJECT INTO THE MUSCLE EVERY 3 (THREE) MONTHS. 7/12/22   Historical Provider, MD   metFORMIN (GLUCOPHAGE-XR) 500 mg 24 hr tablet Take 1,000 mg by mouth 2 (two) times a day      Historical Provider, MD   omeprazole (PriLOSEC) 40 MG capsule     Historical Provider, MD   ondansetron (ZOFRAN-ODT) 4 mg disintegrating tablet as needed  Patient not taking: Reported on 5/12/2023 8/28/22   Historical Provider, MD   oxyCODONE (ROXICODONE) 5 immediate release tablet Take 5 mg by mouth 3/20/23   Historical Provider, MD   potassium citrate (UROCIT-K) 10 mEq PLEASE SEE ATTACHED FOR DETAILED DIRECTIONS 3/14/23   Historical Provider, MD   pravastatin (PRAVACHOL) 40 mg tablet Take 40 mg by mouth 10/19/18   Historical Provider, MD Doris Fernandes FlexTouch 200 units/mL CONCENTRATED U-200 injection pen INJECT 140 UNITS UNDER THE SKIN DAILY. 6/21/22   Historical Provider, MD   venlafaxine (EFFEXOR-XR) 75 mg 24 hr capsule Take 75 mg by mouth every evening    Historical Provider, MD     I have reviewed home medications with patient personally. Allergies: Allergies   Allergen Reactions   • Bacillus Calmette Erick Live Other (See Comments)     Other reaction(s):  Other (See Comments)   "After placed in bladder Caused sepsis and landed in hospital"   "After placed in bladder Caused sepsis and landed in hospital"   "After placed in bladder Caused sepsis and landed in hospital"   • Bcg Live Other (See Comments)      "After placed in bladder Caused sepsis and landed in hospital"   • Fentanyl Shortness Of Breath     "okay with morphine"   • Lithium Other (See Comments)     Other reaction(s): Other (See Comments)  heart block  Heart block   • Depakote [Valproic Acid] Other (See Comments)     Breast enlargement   • Divalproex Sodium    • Influenza Vaccines Swelling     Other reaction(s): Other (See Comments)  GASTROENTERITIS  Arm swelling   • Propoxyphene Hallucinations   • Singulair [Montelukast] Hives   • Topamax [Topiramate] Other (See Comments)     Kidney stones   • Atorvastatin Rash   • Loratadine Rash       Social History:  Marital Status: /Civil Union   Occupation:   Patient Pre-hospital Living Situation:   Patient Pre-hospital Level of Mobility:   Patient Pre-hospital Diet Restrictions:   Substance Use History:   Social History     Substance and Sexual Activity   Alcohol Use No     Social History     Tobacco Use   Smoking Status Former   • Packs/day: 1.50   • Years: 16.00   • Total pack years: 24.00   • Types: Cigarettes   • Quit date: 10/31/2019   • Years since quitting: 3.9   Smokeless Tobacco Never   Tobacco Comments    has been trying to quit     Social History     Substance and Sexual Activity   Drug Use No       Family History:      Physical Exam:     Vitals:   Blood Pressure: 128/78 (10/09/23 2320)  Pulse: 87 (10/09/23 2320)  Temperature: 98 °F (36.7 °C) (10/09/23 2320)  Temp Source: Oral (10/09/23 1814)  Respirations: 16 (10/09/23 2320)  SpO2: 95 % (10/09/23 2320)    Physical Exam  Vitals and nursing note reviewed. Constitutional:       General: She is not in acute distress. Appearance: She is well-developed. HENT:      Head: Normocephalic and atraumatic. Eyes:      Conjunctiva/sclera: Conjunctivae normal.   Cardiovascular:      Rate and Rhythm: Normal rate and regular rhythm. Heart sounds: No murmur heard. Pulmonary:      Effort: Pulmonary effort is normal. No respiratory distress. Breath sounds: Normal breath sounds.    Abdominal: Palpations: Abdomen is soft. Tenderness: There is no abdominal tenderness. There is right CVA tenderness and left CVA tenderness. Musculoskeletal:         General: No swelling. Cervical back: Neck supple. Skin:     General: Skin is warm and dry. Capillary Refill: Capillary refill takes more than 3 seconds. Findings: Erythema present. Comments: She has some itching/erythema around her groin area   Neurological:      Mental Status: She is alert and oriented to person, place, and time. Psychiatric:         Mood and Affect: Mood normal.          Additional Data:     Lab Results:  Results from last 7 days   Lab Units 10/09/23  1848   WBC Thousand/uL 10.23*   HEMOGLOBIN g/dL 12.3   HEMATOCRIT % 37.9   PLATELETS Thousands/uL 262   NEUTROS PCT % 73   LYMPHS PCT % 18   MONOS PCT % 6   EOS PCT % 1     Results from last 7 days   Lab Units 10/09/23  1848   SODIUM mmol/L 135   POTASSIUM mmol/L 4.0   CHLORIDE mmol/L 103   CO2 mmol/L 22   BUN mg/dL 28*   CREATININE mg/dL 1.49*   ANION GAP mmol/L 10   CALCIUM mg/dL 9.4   ALBUMIN g/dL 4.4   TOTAL BILIRUBIN mg/dL 0.35   ALK PHOS U/L 77   ALT U/L 21   AST U/L 17   GLUCOSE RANDOM mg/dL 297*                 Results from last 7 days   Lab Units 10/09/23  1848   LACTIC ACID mmol/L 2.0       Lines/Drains:  Invasive Devices     Peripheral Intravenous Line  Duration           Peripheral IV 10/09/23 Right Antecubital <1 day                    Imaging: No pertinent imaging reviewed. No orders to display       EKG and Other Studies Reviewed on Admission:   · EKG: No EKG obtained. ** Please Note: This note has been constructed using a voice recognition system.  **

## 2023-10-10 NOTE — PLAN OF CARE
Problem: INFECTION - ADULT  Goal: Absence or prevention of progression during hospitalization  Description: INTERVENTIONS:  - Assess and monitor for signs and symptoms of infection  - Monitor lab/diagnostic results  - Monitor all insertion sites, i.e. indwelling lines, tubes, and drains  - Monitor endotracheal if appropriate and nasal secretions for changes in amount and color  - Anita appropriate cooling/warming therapies per order  - Administer medications as ordered  - Instruct and encourage patient and family to use good hand hygiene technique  - Identify and instruct in appropriate isolation precautions for identified infection/condition  Outcome: Progressing     Problem: GENITOURINARY - ADULT  Goal: Maintains or returns to baseline urinary function  Description: INTERVENTIONS:  - Assess urinary function  - Encourage oral fluids to ensure adequate hydration if ordered  - Administer IV fluids as ordered to ensure adequate hydration  - Administer ordered medications as needed  - Offer frequent toileting  - Follow urinary retention protocol if ordered  Outcome: Progressing     Problem: PAIN - ADULT  Goal: Verbalizes/displays adequate comfort level or baseline comfort level  Description: Interventions:  - Encourage patient to monitor pain and request assistance  - Assess pain using appropriate pain scale  - Administer analgesics based on type and severity of pain and evaluate response  - Implement non-pharmacological measures as appropriate and evaluate response  - Consider cultural and social influences on pain and pain management  - Notify physician/advanced practitioner if interventions unsuccessful or patient reports new pain  Outcome: Progressing

## 2023-10-10 NOTE — UTILIZATION REVIEW
Initial Clinical Review    Admission: Date/Time/Statement:   Admission Orders (From admission, onward)     Ordered        10/09/23 2220  INPATIENT ADMISSION  Once                      Orders Placed This Encounter   Procedures   • INPATIENT ADMISSION     Standing Status:   Standing     Number of Occurrences:   1     Order Specific Question:   Level of Care     Answer:   Med Surg [16]     Order Specific Question:   Estimated length of stay     Answer:   More than 2 Midnights     Order Specific Question:   Certification     Answer:   I certify that inpatient services are medically necessary for this patient for a duration of greater than two midnights. See H&P and MD Progress Notes for additional information about the patient's course of treatment. ED Arrival Information     Expected   -    Arrival   10/9/2023 17:52    Acuity   Urgent            Means of arrival   Walk-In    Escorted by   Family Member    Service   Hospitalist    Admission type   Emergency            Arrival complaint   UTI PAIN           Chief Complaint   Patient presents with   • Possible UTI     Pt presents to the ED with reports of UTI symptoms. States she has been on multiple rx regimens over the last 2 months. Initial Presentation: 50 y.o. female with hx HTN, HLD,  GERD, DM2, seizures, bladder CA s/p cystectomy with ileal conduit, multiple UTI's, yeast infections who presents to ED from home for evaluation due to concerns for UTI complicated with possible pyelonephritis. Pt reports she was seen at another hospital ans started on Macrobid and Levaquin which has not resolved her symptoms. She expresses concerns for ongoing UTI given her recent fever/chills , complaints of dysuria, itching around her perineum and bilateral flank pain L >R . Pt reports fouls smelling urine . On exam, bilat CVA tenderness. Itching/erythema around her groin area . Labs WBC 10.23, creat 1.49, glucose elevated 297. UA small leuks, 4-10 WBC .  Pt given IV abx in ED. Admitted as Inpatient with pyelonephritis, uncontrolled DM w/ hyper glycemia . Plan - ID consult, IV abx- Zosyn . F.U cx and labs . Hold home diabetic meds. Start SSI, Accucheks . Start Clotrimazole. Monitor groin areas    Date: 10/10  Day 2:    A1c 10.8, pt started SQ lantus overnight at bedtime . Continue SSI, accucheks . Mild tenderness around lumbar and flank regions. Otherwise she has ileal conduit on the right side of the abdomen without any erythema   CT A/P ordered . Start Robaxin and assess response   ID consult- Bilat flank pain- WBC downh to 10.23 today. Latest urine cx from 10/7 grew pansensitive Enterococcus Faecalis (2 doses of cephalexin + Diflucan) changed to Levofloxacin on 10/9 but patient has not taken any doses of it yet. Suspect bilateral flank pain likely musculoskeletal in origin as patient had paraspinal tenderness on examination. Prior CTA abdomen/pelvis images have been negative for pyelonephritis and patient does not display systemic signs of infection. Urine cultures have grown Enterococcus faecalis on multiple occasions, suspect colonization and does not need to be treated with antibiotics. Plan - D/C IV abx .  Vaginitis- redness/itching/burning around the urethra    Suspect fungal infection 2/2 repeated abx- Continue with clotrimazole 1% cream    ED Triage Vitals   Temperature Pulse Respirations Blood Pressure SpO2   10/09/23 1814 10/09/23 1814 10/09/23 1814 10/09/23 1814 10/09/23 1814   98.7 °F (37.1 °C) 102 20 142/77 97 %      Temp Source Heart Rate Source Patient Position - Orthostatic VS BP Location FiO2 (%)   10/09/23 1814 10/09/23 1814 10/09/23 1814 10/09/23 1814 --   Oral Monitor Sitting Right arm       Pain Score       10/09/23 1948       6          Wt Readings from Last 1 Encounters:   10/10/23 110 kg (242 lb 4.6 oz)     Additional Vital Signs:   Date/Time Temp Pulse Resp BP MAP (mmHg) SpO2   10/10/23 0700 97.9 °F (36.6 °C) 90 18 132/66 86 96 %   10/09/23 23:20:38 98 °F (36.7 °C) 87 16 128/78 95 95 %   10/09/23 2259 -- 84 18 118/79 -- 98 %   10/09/23 2041 -- 89 18 114/59 83 98 %     Pertinent Labs/Diagnostic Test Results:   CT abdomen pelvis wo contrast    (Results Pending)         Results from last 7 days   Lab Units 10/10/23  0442 10/09/23  1848   WBC Thousand/uL 9.70 10.23*   HEMOGLOBIN g/dL 12.1 12.3   HEMATOCRIT % 36.3 37.9   PLATELETS Thousands/uL 256 262   NEUTROS ABS Thousands/µL 6.55 7.48         Results from last 7 days   Lab Units 10/10/23  0442 10/09/23  1848   SODIUM mmol/L 136 135   POTASSIUM mmol/L 3.5 4.0   CHLORIDE mmol/L 106 103   CO2 mmol/L 20* 22   ANION GAP mmol/L 10 10   BUN mg/dL 30* 28*   CREATININE mg/dL 1.53* 1.49*   EGFR ml/min/1.73sq m 39 41   CALCIUM mg/dL 9.1 9.4   MAGNESIUM mg/dL 1.8*  --    PHOSPHORUS mg/dL 3.9  --      Results from last 7 days   Lab Units 10/09/23  1848   AST U/L 17   ALT U/L 21   ALK PHOS U/L 77   TOTAL PROTEIN g/dL 7.3   ALBUMIN g/dL 4.4   TOTAL BILIRUBIN mg/dL 0.35     Results from last 7 days   Lab Units 10/10/23  1101 10/10/23  0742 10/10/23  0437 10/10/23  0212 10/10/23  0106   POC GLUCOSE mg/dl 295* 186* 246* 280* 310*     Results from last 7 days   Lab Units 10/10/23  0442 10/09/23  1848   GLUCOSE RANDOM mg/dL 209* 297*                       Results from last 7 days   Lab Units 10/09/23  1848   LACTIC ACID mmol/L 2.0                   Results from last 7 days   Lab Units 10/09/23  1845   CLARITY UA  Turbid   COLOR UA  Light Yellow   SPEC GRAV UA  1.014   PH UA  6.5   GLUCOSE UA mg/dl Negative   KETONES UA mg/dl Negative   BLOOD UA  Moderate*   PROTEIN UA mg/dl 70 (1+)*   NITRITE UA  Negative   BILIRUBIN UA  Negative   UROBILINOGEN UA (BE) mg/dl <2.0   LEUKOCYTES UA  Small*   WBC UA /hpf 4-10*   RBC UA /hpf 1-2   BACTERIA UA /hpf Occasional   EPITHELIAL CELLS WET PREP /hpf Occasional   MUCUS THREADS  Occasional*         ED Treatment:   Medication Administration from 10/09/2023 1972 to 10/09/2023 3460       Date/Time Order Dose Route Action     10/09/2023 2016 EDT piperacillin-tazobactam (ZOSYN) 3.375 g in sodium chloride 0.9 % 100 mL IVPB 0 g Intravenous Stopped     10/09/2023 1946 EDT piperacillin-tazobactam (ZOSYN) 3.375 g in sodium chloride 0.9 % 100 mL IVPB 3.375 g Intravenous New Bag        Past Medical History:   Diagnosis Date   • Anxiety    • Arthritis of lumbar spine    • Asthma     "mild"   • Bilateral dry eyes    • Bilateral shoulder pain     with right arm pain from neck   • Bipolar disorder (HCC)    • Bladder CA in situ    • Bulging of cervical intervertebral disc     C7 and C8   • Bursitis of hip, right    • Cancer (HCC)     bladder   • Carpal tunnel syndrome, bilateral    • Cataract     right   • Cervical spine arthritis    • Chronic pain disorder    • Colitis    • Cystitis, chronic    • Dental crown present    • Depression    • Deviated septum    • Diabetes mellitus ([address])    • Family history of factor V Leiden mutation     Mom has;  pt reports " I tested negative"   • Fatty liver    • GERD (gastroesophageal reflux disease)    • Grinding teeth    • High cholesterol    • History of heart block     "from reaction to Lithium"   • History of recent fall     3 weeks ago or so fell on ice   • HPV (human papilloma virus) infection     "dormant"   • Hypertension    • Irritable bowel syndrome    • Left knee injury     "years ago"   • Nicotine dependence    • Psychiatric disorder     Panic disorder/none in a long time   • Rotator cuff tendinitis     bilat   • Seizures ([address])     "diagnosed with tonic/clonic seizures from coming off lithium but since on lamictal no seizure x 10 years"   • Shortness of breath     mild   • Sinus infection     saw Dr Nika Elliott 2/18 and is taking antibiotics   • Wears glasses      Present on Admission:  • Essential hypertension  • Uncontrolled type 2 diabetes mellitus with hyperglycemia (HCC)  • Simple partial seizures (HCC)      Admitting Diagnosis: UTI (urinary tract infection) [N39.0]  Age/Sex: 50 y.o. female  Admission Orders:  Scheduled Medications:  atenolol, 25 mg, Oral, Daily  clonazePAM, 0.5 mg, Oral, TID  clotrimazole, , Topical, BID  heparin (porcine), 5,000 Units, Subcutaneous, Q8H [address]  insulin glargine, 110 Units, Subcutaneous, HS  insulin lispro, 1-6 Units, Subcutaneous, 4x Daily (AC & HS)  lamoTRIgine, 225 mg, Oral, BID  losartan, 25 mg, Oral, Daily  pantoprazole, 40 mg, Oral, Early Morning  piperacillin-tazobactam, 3.375 g, Intravenous, Q6H End: 10/10/23 1359  potassium citrate, 10 mEq, Oral, TID With Meals  pravastatin, 40 mg, Oral, Daily With Dinner  venlafaxine, 75 mg, Oral, QPM  methocarbamol (ROBAXIN) tablet 500 mg  Dose: 500 mg  Freq: Every 8 hours scheduled Route: PO  Start: 10/10/23 1600    Continuous IV Infusions:     PRN Meds:  acetaminophen, 650 mg, Oral, Q6H PRN  oxyCODONE, 5 mg, Oral, Q4H PRN        IP CONSULT TO INFECTIOUS DISEASES    Network Utilization Review Department  ATTENTION: Please call with any questions or concerns to [phone] and carefully listen to the prompts so that you are directed to the right person. All voicemails are confidential.   For Discharge needs, contact Care Management DC Support Team at [phone] opt. 2  Send all requests for admission clinical reviews, approved or denied determinations and any other requests to dedicated fax number below belonging to the campus where the patient is receiving treatment.  List of dedicated fax numbers for the Facilities:  [place] DENIALS (Administrative/Medical Necessity) [phone]   DISCHARGE SUPPORT TEAM (NETWORK) [phone]   [address] (Maternity/NICU/Pediatrics) [phone]   [address] [facility] [address] [phone]   [address] [address] [phone]

## 2023-10-11 ENCOUNTER — TELEPHONE (OUTPATIENT)
Age: 48
End: 2023-10-11

## 2023-10-11 VITALS
WEIGHT: 237 LBS | RESPIRATION RATE: 18 BRPM | DIASTOLIC BLOOD PRESSURE: 78 MMHG | BODY MASS INDEX: 39.44 KG/M2 | SYSTOLIC BLOOD PRESSURE: 118 MMHG | TEMPERATURE: 98.9 F | OXYGEN SATURATION: 96 % | HEART RATE: 80 BPM

## 2023-10-11 PROBLEM — M54.50 LOWER BACK PAIN: Status: ACTIVE | Noted: 2023-10-09

## 2023-10-11 LAB
ANION GAP SERPL CALCULATED.3IONS-SCNC: 8 MMOL/L
BUN SERPL-MCNC: 24 MG/DL (ref 5–25)
CALCIUM SERPL-MCNC: 9.1 MG/DL (ref 8.4–10.2)
CHLORIDE SERPL-SCNC: 111 MMOL/L (ref 96–108)
CO2 SERPL-SCNC: 20 MMOL/L (ref 21–32)
CREAT SERPL-MCNC: 1.48 MG/DL (ref 0.6–1.3)
ERYTHROCYTE [DISTWIDTH] IN BLOOD BY AUTOMATED COUNT: 15.4 % (ref 11.6–15.1)
GFR SERPL CREATININE-BSD FRML MDRD: 41 ML/MIN/1.73SQ M
GLUCOSE SERPL-MCNC: 110 MG/DL (ref 65–140)
GLUCOSE SERPL-MCNC: 171 MG/DL (ref 65–140)
HCT VFR BLD AUTO: 35.2 % (ref 34.8–46.1)
HGB BLD-MCNC: 11.5 G/DL (ref 11.5–15.4)
MCH RBC QN AUTO: 28.1 PG (ref 26.8–34.3)
MCHC RBC AUTO-ENTMCNC: 32.7 G/DL (ref 31.4–37.4)
MCV RBC AUTO: 86 FL (ref 82–98)
PLATELET # BLD AUTO: 238 THOUSANDS/UL (ref 149–390)
PMV BLD AUTO: 9.9 FL (ref 8.9–12.7)
POTASSIUM SERPL-SCNC: 3.4 MMOL/L (ref 3.5–5.3)
RBC # BLD AUTO: 4.09 MILLION/UL (ref 3.81–5.12)
SODIUM SERPL-SCNC: 139 MMOL/L (ref 135–147)
WBC # BLD AUTO: 6.83 THOUSAND/UL (ref 4.31–10.16)

## 2023-10-11 PROCEDURE — 82948 REAGENT STRIP/BLOOD GLUCOSE: CPT

## 2023-10-11 PROCEDURE — 99239 HOSP IP/OBS DSCHRG MGMT >30: CPT | Performed by: INTERNAL MEDICINE

## 2023-10-11 PROCEDURE — 80048 BASIC METABOLIC PNL TOTAL CA: CPT

## 2023-10-11 PROCEDURE — 85027 COMPLETE CBC AUTOMATED: CPT

## 2023-10-11 RX ADMIN — INSULIN LISPRO 3 UNITS: 100 INJECTION, SOLUTION INTRAVENOUS; SUBCUTANEOUS at 08:16

## 2023-10-11 RX ADMIN — ATENOLOL 25 MG: 25 TABLET ORAL at 08:16

## 2023-10-11 RX ADMIN — CLONAZEPAM 0.5 MG: 0.5 TABLET ORAL at 08:16

## 2023-10-11 RX ADMIN — POTASSIUM CITRATE 10 MEQ: 10 TABLET, EXTENDED RELEASE ORAL at 08:21

## 2023-10-11 RX ADMIN — MICONAZOLE NITRATE: 20 CREAM TOPICAL at 08:27

## 2023-10-11 RX ADMIN — LAMOTRIGINE 225 MG: 100 TABLET ORAL at 08:33

## 2023-10-11 RX ADMIN — LOSARTAN POTASSIUM 25 MG: 25 TABLET, FILM COATED ORAL at 08:15

## 2023-10-11 NOTE — UTILIZATION REVIEW
Date: 10/11    Day 3: Has surpassed a 2nd midnight with active treatments and services, which include cont monitoring of the groin area, cont tx w/ clotrimazole 1% cream, cont pain control prn, cont to mon labs. See initial review completed by Urszula Wagner on 10/10.

## 2023-10-11 NOTE — DISCHARGE SUMMARY
[address], 1975, 50 y.o. female MRN: [id number]   Unit/Bed#: S /S -01 Encounter: [id number]   Primary Care Physician: Yolanda Velazquez MD   Date and Time Admitted to Hospital: 10/9/2023  5:57 PM     Assessment/Plan    * Lower back pain  Assessment & Plan  Initially thought to be secondary to pyelonephritis due to patients complaints of subjective fever, and generalized low back pain  After ID consult the pain is believed to be more MSK in nature and origin    Erythema of groin  Assessment & Plan  -Patient was complaining of some redness/itching near her groin area-has history of fungal infections  -discharge on trial of terconazole  -Follow up with PCP recommended    Hyperlipidemia  Assessment & Plan  Continue patient's home dose of pravastatin 40 mg    Uncontrolled type 2 diabetes mellitus with hyperglycemia [facility])  Assessment & Plan  Lab Results   Component Value Date    HGBA1C 10.8 (H) 07/16/2023       Recent Labs     10/10/23  1101 10/10/23  1547 10/10/23  2141 10/11/23  0617   POCGLU 295* 199* 326* 171*       Blood Sugar Average: Last 72 hrs:  (P) 478.380OBRJ patients Trulicity and metformin.   Currently on sliding scale insulin  Basal insulin increased to 115 units    Essential hypertension  Assessment & Plan  Continue her home medications of losartan 25 mg and atenolol 25 mg    Simple partial seizures (HCC)  Assessment & Plan  Continue patient's home dose clonazepam 0.5 mg TID and Lamictal to 225 mg twice daily       Medical Problems       Resolved Problems  Date Reviewed: 5/12/2023   None             Discharging Resident: Bolling Apley, DO  Discharging Attending: No att. providers found  PCP: Yolanda Velazquez MD  Admission Date:   Admission Orders (From admission, onward)       Ordered        10/09/23 2220  INPATIENT ADMISSION  Once                          Discharge Date: 10/11/23    Consultations During Hospital Stay:  Jose INFECTIOUS DISEASES     Procedures Performed:   None    Significant Findings / Test Results:   None    Incidental Findings:   None    Test Results Pending at Discharge (will require follow up): None     Outpatient Tests Requested:  None    Complications:  None    Reason for Admission: Back pain    Hospital Course:   Sue Ferguson is a 50 y.o. female who presented to the hospital on 10/9/2023 initially due to bilateral flank pain. She has been having these symptoms for the past 1-2 months and has recently been treated at [facility] for this same problem and was treated with antibiotics. She did not have any documented fevers while admitted. Infectious disease was consulted to evaluate if the patient was having true infection after multiple courses of antibiotics. As the patient has ileal conduit the urine cultures are expected to show some bacteria due to colonization. Since the patient is not showing florid signs of sepsis or infection, ID recommended no antibiotics at this time. The patient received 3 doses of Zosyn in total.  Robaxin was offered for the patient's back pain but was refused. She also complained of some vaginal itching and clotrimazole and miconazole were prescribed but did not help her much. Per patient request she stated that her urologist prescribed Terconazole 0.8% cream so she was discharged with this for her vaginitis. The patient, initially admitted to the hospital as inpatient, was discharged earlier than expected given the following: Initially concerned for infection however after ID consultations it was thought this was a MSK etiology for her pain. Please see above list of diagnoses and related plan for additional information. Condition at Discharge: Stable    Discharge Day Visit / Exam:   Subjective: Today the patient is feeling better, states her back does not hurt as much. No acute events overnight.   She denies fevers, chills, dizziness, nausea, committing, constipation, diarrhea. Vitals: Blood Pressure: 118/78 (10/11/23 0700)  Pulse: 80 (10/11/23 0700)  Temperature: 98.9 °F (37.2 °C) (10/11/23 0700)  Temp Source: Oral (10/11/23 0700)  Respirations: 18 (10/11/23 0700)  Weight - Scale: 108 kg (237 lb) (10/11/23 0539)  SpO2: 96 % (10/11/23 1008)    Physical Exam  Vitals reviewed. Constitutional:       General: She is not in acute distress. Appearance: She is obese. She is not ill-appearing, toxic-appearing or diaphoretic. HENT:      Head: Normocephalic and atraumatic. Right Ear: External ear normal.      Left Ear: External ear normal.   Eyes:      Conjunctiva/sclera: Conjunctivae normal.   Cardiovascular:      Rate and Rhythm: Normal rate and regular rhythm. Pulses: Normal pulses. Heart sounds: Normal heart sounds. No murmur heard. No friction rub. No gallop. Pulmonary:      Effort: Pulmonary effort is normal. No respiratory distress. Breath sounds: Normal breath sounds. No stridor. No wheezing, rhonchi or rales. Chest:      Chest wall: No tenderness. Abdominal:      General: There is no distension. Tenderness: There is no abdominal tenderness. There is no guarding or rebound. Comments: Ileal conduit R lower abdomen   Musculoskeletal:         General: Tenderness present. Comments: Mid back pain billaterally   Skin:     General: Skin is warm and dry. Neurological:      General: No focal deficit present. Mental Status: She is alert and oriented to person, place, and time. Psychiatric:         Mood and Affect: Mood normal.          Discussion with Family: Attempted to update  () via phone. Left voicemail. Discharge instructions/Information to patient and family:   See after visit summary for information provided to patient and family. Provisions for Follow-Up Care:  See after visit summary for information related to follow-up care and any pertinent home health orders. Disposition:   Home    Planned Readmission: No     Discharge Medications:  See after visit summary for reconciled discharge medications provided to patient and/or family.       **Please Note: This note may have been constructed using a voice recognition system**

## 2023-10-11 NOTE — ASSESSMENT & PLAN NOTE
-Patient was complaining of some redness/itching near her groin area-has history of fungal infections  -discharge on trial of terconazole  -Follow up with PCP recommended

## 2023-10-11 NOTE — TELEPHONE ENCOUNTER
PT scheduled for a hospital follow up with Brooke Nice on 11/16/ at 8 am    Pt requesting that since she lives in [facility] this can Virtual but too the over .      Please call her back at [phone]

## 2023-10-11 NOTE — DISCHARGE INSTR - AVS FIRST PAGE
Dear Giluia Maxwell,     It was our pleasure to care for you here at [facility]. It is our hope that we were always able to exceed the expected standards for your care during your stay. You were hospitalized due to back pain. You were cared for on the [facility] 4th floor by Ritu Rouse MD under the service of Katlin Rucker MD with the [facility] who covers for your primary care physician (PCP), Edy Christian MD, while you were hospitalized. If you have any questions or concerns related to this hospitalization, you may contact us at [phone]. For follow up as well as any medication refills, we recommend that you follow up with your primary care physician. A registered nurse will reach out to you by phone within a few days after your discharge to answer any additional questions that you may have after going home. However, at this time we provide for you here, the most important instructions / recommendations at discharge:     Notable Medication Adjustments -   You may take tylenol as needed for pain \  You may apply one terconazole applicator to the vagina daily for itching  Testing Required after Discharge -   none  ** Please contact your PCP to request testing orders for any of the testing recommended here **  Important follow up information -   Please follow up with your PCP within one week of discharge  Other Instructions -   If your symptoms worsen call your pcp or return to the ED  Please review this entire after visit summary as additional general instructions including medication list, appointments, activity, diet, any pertinent wound care, and other additional recommendations from your care team that may be provided for you.       Sincerely,     Ritu Rouse MD

## 2023-10-11 NOTE — UTILIZATION REVIEW
NOTIFICATION OF INPATIENT ADMISSION   AUTHORIZATION REQUEST   SERVICING FACILITY:   [address] [place] [address]  Tax ID: [id number]  NPI: [id number]   ATTENDING PROVIDER:  Attending Name and NPI#: Isacc High Md [[id number]]  Address: [address] [place] [address]  Phone: [phone]     ADMISSION INFORMATION:  Place of Service: Inpatient [address] [facility]  Place of Service Code: 21  Inpatient Admission Date/Time: 10/9/23 10:20 PM  Discharge Date/Time: No discharge date for patient encounter. Admitting Diagnosis Code/Description:  UTI (urinary tract infection) [N39.0]     UTILIZATION REVIEW CONTACT:  Melissa Bob Utilization   Network Utilization Review Department  Phone: [phone]  Fax: [phone]  Email: [email or web address]  Contact for approvals/pending authorizations, clinical reviews, and discharge. PHYSICIAN ADVISORY SERVICES:  Medical Necessity Denial & Zdqx-uu-Uypv Review  Phone: [phone]  Fax: [phone]  Email: [email or web address]     DISCHARGE SUPPORT TEAM:  For Patients Discharge Needs & Updates  Phone: [phone] opt. 2 Fax: [phone]  Email: [email or web address]

## 2023-10-11 NOTE — ASSESSMENT & PLAN NOTE
Initially thought to be secondary to pyelonephritis due to patients complaints of subjective fever, and generalized low back pain  After ID consult the pain is believed to be more MSK in nature and origin

## 2023-10-11 NOTE — PLAN OF CARE
Problem: PAIN - ADULT  Goal: Verbalizes/displays adequate comfort level or baseline comfort level  Description: Interventions:  - Encourage patient to monitor pain and request assistance  - Assess pain using appropriate pain scale  - Administer analgesics based on type and severity of pain and evaluate response  - Implement non-pharmacological measures as appropriate and evaluate response  - Consider cultural and social influences on pain and pain management  - Notify physician/advanced practitioner if interventions unsuccessful or patient reports new pain  Outcome: Progressing     Problem: INFECTION - ADULT  Goal: Absence or prevention of progression during hospitalization  Description: INTERVENTIONS:  - Assess and monitor for signs and symptoms of infection  - Monitor lab/diagnostic results  - Monitor all insertion sites, i.e. indwelling lines, tubes, and drains  - Monitor endotracheal if appropriate and nasal secretions for changes in amount and color  - [place] appropriate cooling/warming therapies per order  - Administer medications as ordered  - Instruct and encourage patient and family to use good hand hygiene technique  - Identify and instruct in appropriate isolation precautions for identified infection/condition  Outcome: Progressing     Problem: GENITOURINARY - ADULT  Goal: Maintains or returns to baseline urinary function  Description: INTERVENTIONS:  - Assess urinary function  - Encourage oral fluids to ensure adequate hydration if ordered  - Administer IV fluids as ordered to ensure adequate hydration  - Administer ordered medications as needed  - Offer frequent toileting  - Follow urinary retention protocol if ordered  Outcome: Progressing

## 2023-10-11 NOTE — ASSESSMENT & PLAN NOTE
Lab Results   Component Value Date    HGBA1C 10.8 (H) 07/16/2023       Recent Labs     10/10/23  1101 10/10/23  1547 10/10/23  2141 10/11/23  0617   POCGLU 295* 199* 326* 171*       Blood Sugar Average: Last 72 hrs:  (P) 875.786RQBE patients Trulicity and metformin.   Currently on sliding scale insulin  Basal insulin increased to 115 units

## 2023-10-12 LAB
BACTERIA UR CULT: ABNORMAL

## 2023-10-12 NOTE — TELEPHONE ENCOUNTER
VM left for patient to return call to discuss her appointment scheduled for 11/16. Joseph Vasquez does not wish to do virtual- advised patient should be seen in person. Patient can be re-scheduled in the [facility] office with AP or alternative office of her choice if she does not wish to travel to [facility].

## 2023-10-18 ENCOUNTER — HOSPITAL ENCOUNTER (EMERGENCY)
Facility: HOSPITAL | Age: 48
Discharge: HOME | End: 2023-10-18
Attending: EMERGENCY MEDICINE
Payer: MEDICARE

## 2023-10-18 VITALS
OXYGEN SATURATION: 98 % | TEMPERATURE: 99.1 F | RESPIRATION RATE: 18 BRPM | SYSTOLIC BLOOD PRESSURE: 127 MMHG | DIASTOLIC BLOOD PRESSURE: 80 MMHG | HEART RATE: 92 BPM

## 2023-10-18 DIAGNOSIS — N99.528 COMPLICATION OF ILEAL CONDUIT (HCC): Primary | ICD-10-CM

## 2023-10-18 LAB
ANION GAP SERPL CALCULATED.3IONS-SCNC: 11 MMOL/L
BASOPHILS # BLD AUTO: 0.05 THOUSANDS/ÂΜL (ref 0–0.1)
BASOPHILS NFR BLD AUTO: 1 % (ref 0–1)
BUN SERPL-MCNC: 35 MG/DL (ref 5–25)
CALCIUM SERPL-MCNC: 9.2 MG/DL (ref 8.4–10.2)
CHLORIDE SERPL-SCNC: 107 MMOL/L (ref 96–108)
CO2 SERPL-SCNC: 15 MMOL/L (ref 21–32)
CREAT SERPL-MCNC: 1.65 MG/DL (ref 0.6–1.3)
EOSINOPHIL # BLD AUTO: 0.14 THOUSAND/ÂΜL (ref 0–0.61)
EOSINOPHIL NFR BLD AUTO: 1 % (ref 0–6)
ERYTHROCYTE [DISTWIDTH] IN BLOOD BY AUTOMATED COUNT: 15.2 % (ref 11.6–15.1)
GFR SERPL CREATININE-BSD FRML MDRD: 36 ML/MIN/1.73SQ M
GLUCOSE SERPL-MCNC: 361 MG/DL (ref 65–140)
HCT VFR BLD AUTO: 38.1 % (ref 34.8–46.1)
HGB BLD-MCNC: 12.5 G/DL (ref 11.5–15.4)
IMM GRANULOCYTES # BLD AUTO: 0.09 THOUSAND/UL (ref 0–0.2)
IMM GRANULOCYTES NFR BLD AUTO: 1 % (ref 0–2)
LYMPHOCYTES # BLD AUTO: 1.7 THOUSANDS/ÂΜL (ref 0.6–4.47)
LYMPHOCYTES NFR BLD AUTO: 17 % (ref 14–44)
MCH RBC QN AUTO: 27.9 PG (ref 26.8–34.3)
MCHC RBC AUTO-ENTMCNC: 32.8 G/DL (ref 31.4–37.4)
MCV RBC AUTO: 85 FL (ref 82–98)
MONOCYTES # BLD AUTO: 0.52 THOUSAND/ÂΜL (ref 0.17–1.22)
MONOCYTES NFR BLD AUTO: 5 % (ref 4–12)
NEUTROPHILS # BLD AUTO: 7.31 THOUSANDS/ÂΜL (ref 1.85–7.62)
NEUTS SEG NFR BLD AUTO: 75 % (ref 43–75)
NRBC BLD AUTO-RTO: 0 /100 WBCS
PLATELET # BLD AUTO: 243 THOUSANDS/UL (ref 149–390)
PMV BLD AUTO: 10.7 FL (ref 8.9–12.7)
POTASSIUM SERPL-SCNC: 4.1 MMOL/L (ref 3.5–5.3)
RBC # BLD AUTO: 4.48 MILLION/UL (ref 3.81–5.12)
SODIUM SERPL-SCNC: 133 MMOL/L (ref 135–147)
WBC # BLD AUTO: 9.81 THOUSAND/UL (ref 4.31–10.16)

## 2023-10-18 PROCEDURE — 87077 CULTURE AEROBIC IDENTIFY: CPT | Performed by: EMERGENCY MEDICINE

## 2023-10-18 PROCEDURE — 99284 EMERGENCY DEPT VISIT MOD MDM: CPT | Performed by: EMERGENCY MEDICINE

## 2023-10-18 PROCEDURE — 80048 BASIC METABOLIC PNL TOTAL CA: CPT | Performed by: EMERGENCY MEDICINE

## 2023-10-18 PROCEDURE — 36415 COLL VENOUS BLD VENIPUNCTURE: CPT | Performed by: EMERGENCY MEDICINE

## 2023-10-18 PROCEDURE — 87186 SC STD MICRODIL/AGAR DIL: CPT | Performed by: EMERGENCY MEDICINE

## 2023-10-18 PROCEDURE — 99284 EMERGENCY DEPT VISIT MOD MDM: CPT

## 2023-10-18 PROCEDURE — 85025 COMPLETE CBC W/AUTO DIFF WBC: CPT | Performed by: EMERGENCY MEDICINE

## 2023-10-18 PROCEDURE — 87147 CULTURE TYPE IMMUNOLOGIC: CPT | Performed by: EMERGENCY MEDICINE

## 2023-10-18 PROCEDURE — 87086 URINE CULTURE/COLONY COUNT: CPT | Performed by: EMERGENCY MEDICINE

## 2023-10-18 NOTE — TELEPHONE ENCOUNTER
Reason for Disposition  • Stoma turns purple or black    Protocols used: Ostomy Symptoms and Questions-ADULT-

## 2023-10-18 NOTE — ED PROVIDER NOTES
History  Chief Complaint   Patient presents with    Medical Problem     Pt presents c/o "reddening of stoma" states her urine is less and less often and states she has some abd discomfort     50 female with history of ileal conduit status post bladder cancer denting to the ED with complaints of darkening of the stoma site decreased urine output and increased clear/white mucus output since last night. She states that she saw her stoma site last night appeared darker than morning got worried and called her urologist who referred her to the ED. She denies any signs of infection to the stoma site, or purulent drainage however that there has been decreased urine output and increased mucousy discharge. She reports chronic abdominal, which has been worsening over the past few days as well consistent with flares which were initially thought to be pyelonephritis versus her hernia pain. patient states that she has had multiple ED visits and admissions for possible pyelonephritis received antibiotic treatment earlier this month for this and was seen by I&D who further left her symptoms as musculoskeletal in nature rather infectious. She states symptoms feel similar to them and are not increased from that time. She supports chronic diarrhea. Denies fevers, chills, headache, syncope, pain, shortness of breath, nausea, vomiting, constipation, bloody stools, vaginal pain, vaginal bleed, pelvic pain, hematuria, pain, leg swelling, rash. Prior to Admission Medications   Prescriptions Last Dose Informant Patient Reported? Taking?    Cholecalciferol (VITAMIN D3 GUMMIES ADULT PO)   Yes No   Continuous Blood Gluc  (FreeStyle [place] 2 [place]) EDSON   Yes No   Continuous Blood Gluc Sensor (FreeStyle [place]ureen 2 Sensor) MISC   Yes No   Dulaglutide (Trulicity) 6.63 ME/8.7XS SOPN   Yes No   Sig: Inject 0.75 mg under the skin Once a week   Tresiba FlexTouch 200 units/mL CONCENTRATED U-200 injection pen  Self Yes No   Sig: INJECT 140 UNITS UNDER THE SKIN DAILY. atenolol (TENORMIN) 25 mg tablet  Self Yes No   Si daily   clonazePAM (KlonoPIN) 0.5 mg tablet  Self Yes No   Sig: Take 0.5 mg by mouth 3 (three) times a day   clotrimazole (LOTRIMIN) 1 % cream  Self Yes No   Sig: Apply topically as needed   Patient not taking: Reported on 2023   glucose blood test strip  Self Yes No   Sig: Freestyle Lite test strips. Test 8XD. Patient not taking: Reported on 2023   lamoTRIgine (LaMICtal) 200 MG tablet  Self No No   Sig: TAKE 1 TABLET TWICE DAILY. Patient taking differently: Take 225 mg by mouth 2 (two) times a day   losartan (COZAAR) 25 mg tablet   Yes No   Sig: TAKE 1 TABLET BY MOUTH 1 TIME EACH DAY. medroxyPROGESTERone acetate (DEPO-PROVERA SYRINGE) 150 mg/mL injection   Yes No   Sig: INJECT 150 MG INJECT INTO THE MUSCLE EVERY 3 (THREE) MONTHS.    metFORMIN (GLUCOPHAGE-XR) 500 mg 24 hr tablet  Self Yes No   Sig: Take 1,000 mg by mouth 2 (two) times a day     omeprazole (PriLOSEC) 40 MG capsule   Yes No   potassium citrate (UROCIT-K) 10 mEq   Yes No   Sig: PLEASE SEE ATTACHED FOR DETAILED DIRECTIONS   pravastatin (PRAVACHOL) 40 mg tablet  Self Yes No   Sig: Take 40 mg by mouth   terconazole (TERAZOL 3) 0.8 % vaginal cream   No No   Sig: Insert 1 applicator into the vagina daily at bedtime   venlafaxine (EFFEXOR-XR) 75 mg 24 hr capsule  Self Yes No   Sig: Take 75 mg by mouth every evening      Facility-Administered Medications: None       Past Medical History:   Diagnosis Date    Anxiety     Arthritis of lumbar spine     Asthma     "mild"    Bilateral dry eyes     Bilateral shoulder pain     with right arm pain from neck    Bipolar disorder (HCC)     Bladder CA in situ     Bulging of cervical intervertebral disc     C7 and C8    Bursitis of hip, right     Cancer (HCC)     bladder    Carpal tunnel syndrome, bilateral     Cataract     right    Cervical spine arthritis     Chronic pain disorder     Colitis     Cystitis, chronic Dental crown present     Depression     Deviated septum     Diabetes mellitus ([address])     Family history of factor V Leiden mutation     Mom has;  pt reports " I tested negative"    Fatty liver     GERD (gastroesophageal reflux disease)     Grinding teeth     High cholesterol     History of heart block     "from reaction to Lithium"    History of recent fall     3 weeks ago or so fell on ice    HPV (human papilloma virus) infection     "dormant"    Hypertension     Irritable bowel syndrome     Left knee injury     "years ago"    Nicotine dependence     Psychiatric disorder     Panic disorder/none in a long time    Rotator cuff tendinitis     bilat    Seizures ([address])     "diagnosed with tonic/clonic seizures from coming off lithium but since on lamictal no seizure x 10 years"    Shortness of breath     mild    Sinus infection     saw Dr Peng Dhillon  and is taking antibiotics    Wears glasses        Past Surgical History:   Procedure Laterality Date    BLADDER SURGERY N/A     CHOLECYSTECTOMY      ESOPHAGOGASTRODUODENOSCOPY      GYNECOLOGIC CRYOSURGERY      "for HPV"    INDUCED       "age 25"    INSTILLATION MYTOMYCIN N/A 2019    Procedure: INSTILLATION MITOMYCIN;  Surgeon: Sehrman Velázquez MD;  Location: [facility];  Service: Urology    DE CYSTO W/REMOVAL OF LESIONS SMALL N/A 2019    Procedure: TRANSURETHRAL RESECTION OF BLADDER TUMOR (TURBT); Surgeon: Sherman Velázquez MD;  Location: [facility];  Service: Urology    URETHRA SURGERY          WISDOM TOOTH EXTRACTION         Family History   Problem Relation Age of Onset    Aneurysm Father     Vascular Disease Mother     Depression Mother     Anxiety disorder Mother     Alzheimer's disease Paternal Grandmother     Prostate cancer Paternal Grandfather     Parkinsonism Paternal Grandfather     Bipolar disorder Brother     Diabetes Family      I have reviewed and agree with the history as documented.     E-Cigarette/Vaping    E-Cigarette Use Never User E-Cigarette/Vaping Substances    Nicotine No     THC No     CBD No     Flavoring No     Other No     Unknown No      Social History     Tobacco Use    Smoking status: Former     Packs/day: 1.50     Years: 16.00     Total pack years: 24.00     Types: Cigarettes     Quit date: 10/31/2019     Years since quitting: 3.9    Smokeless tobacco: Never    Tobacco comments:     has been trying to quit   Vaping Use    Vaping Use: Never used   Substance Use Topics    Alcohol use: No    Drug use: No        Review of Systems   Constitutional:  Negative for chills and fever. HENT:  Negative for congestion, sinus pressure, sinus pain and sore throat. Eyes:  Negative for photophobia and visual disturbance. Respiratory:  Negative for cough and shortness of breath. Cardiovascular:  Negative for chest pain, palpitations and leg swelling. Gastrointestinal:  Positive for abdominal distention, abdominal pain and diarrhea. Negative for blood in stool, constipation, nausea and vomiting. Genitourinary:  Positive for decreased urine volume and flank pain. Negative for difficulty urinating, dysuria, pelvic pain, urgency, vaginal bleeding, vaginal discharge and vaginal pain. Musculoskeletal:  Negative for back pain, neck pain and neck stiffness. Skin:  Negative for pallor, rash and wound. Neurological:  Negative for syncope, numbness and headaches. Physical Exam  ED Triage Vitals [10/18/23 1739]   Temperature Pulse Respirations Blood Pressure SpO2   99.1 °F (37.3 °C) 92 18 127/80 98 %      Temp Source Heart Rate Source Patient Position - Orthostatic VS BP Location FiO2 (%)   Oral Monitor Sitting Right arm --      Pain Score       --             Orthostatic Vital Signs  Vitals:    10/18/23 1739   BP: 127/80   Pulse: 92   Patient Position - Orthostatic VS: Sitting       Physical Exam  Vitals and nursing note reviewed. Constitutional:       General: She is not in acute distress. Appearance: Normal appearance.  She is obese. She is not ill-appearing, toxic-appearing or diaphoretic. HENT:      Head: Normocephalic and atraumatic. Right Ear: External ear normal.      Left Ear: External ear normal.      Nose: Nose normal. No congestion or rhinorrhea. Mouth/Throat:      Mouth: Mucous membranes are moist.      Pharynx: Oropharynx is clear. No oropharyngeal exudate or posterior oropharyngeal erythema. Eyes:      Extraocular Movements: Extraocular movements intact. Conjunctiva/sclera: Conjunctivae normal.      Pupils: Pupils are equal, round, and reactive to light. Cardiovascular:      Rate and Rhythm: Normal rate and regular rhythm. Pulses: Normal pulses. Heart sounds: Normal heart sounds. No murmur heard. No friction rub. No gallop. Pulmonary:      Effort: Pulmonary effort is normal. No respiratory distress. Breath sounds: Normal breath sounds. No stridor. No wheezing, rhonchi or rales. Chest:      Chest wall: No tenderness. Abdominal:      General: Abdomen is flat. The ostomy site is clean. Bowel sounds are normal. There is distension (Mild). Palpations: Abdomen is soft. Tenderness: There is generalized abdominal tenderness (Mild diffuse). There is no right CVA tenderness, left CVA tenderness, guarding or rebound. Negative signs include Al's sign and McBurney's sign. Comments: Ileal conduit on appears healthy, beefy red without signs of overlying infection. No purulent drainage noted. Scant amount of clear/white mucus discharge. No hematuria in urine bag. No palpable hernia. Musculoskeletal:         General: No swelling, tenderness, deformity or signs of injury. Normal range of motion. Cervical back: Normal range of motion and neck supple. Right lower leg: No edema. Left lower leg: No edema. Skin:     General: Skin is warm and dry. Capillary Refill: Capillary refill takes less than 2 seconds.       Coloration: Skin is not jaundiced or pale.      Findings: No bruising, erythema, lesion or rash. Neurological:      General: No focal deficit present. Mental Status: She is alert and oriented to person, place, and time. Cranial Nerves: No cranial nerve deficit. Sensory: Sensation is intact. No sensory deficit. Motor: Motor function is intact. No weakness. Coordination: Coordination normal.      Gait: Gait normal.   Psychiatric:         Mood and Affect: Mood normal.         Behavior: Behavior normal.         Thought Content:  Thought content normal.         Judgment: Judgment normal.         ED Medications  Medications - No data to display    Diagnostic Studies  Results Reviewed       Procedure Component Value Units Date/Time    Basic metabolic panel [[id number]]  (Abnormal) Collected: 10/18/23 2000    Lab Status: Final result Specimen: Blood from Arm, Right Updated: 10/18/23 2028     Sodium 133 mmol/L      Potassium 4.1 mmol/L      Chloride 107 mmol/L      CO2 15 mmol/L      ANION GAP 11 mmol/L      BUN 35 mg/dL      Creatinine 1.65 mg/dL      Glucose 361 mg/dL      Calcium 9.2 mg/dL      eGFR 36 ml/min/1.73sq m     Narrative:      [place] guidelines for Chronic Kidney Disease (CKD):     Stage 1 with normal or high GFR (GFR > 90 mL/min/1.73 square meters)    Stage 2 Mild CKD (GFR = 60-89 mL/min/1.73 square meters)    Stage 3A Moderate CKD (GFR = 45-59 mL/min/1.73 square meters)    Stage 3B Moderate CKD (GFR = 30-44 mL/min/1.73 square meters)    Stage 4 Severe CKD (GFR = 15-29 mL/min/1.73 square meters)    Stage 5 End Stage CKD (GFR <15 mL/min/1.73 square meters)  Note: GFR calculation is accurate only with a steady state creatinine    CBC and differential [[id number]]  (Abnormal) Collected: 10/18/23 2000    Lab Status: Final result Specimen: Blood from Arm, Right Updated: 10/18/23 2011     WBC 9.81 Thousand/uL      RBC 4.48 Million/uL      Hemoglobin 12.5 g/dL      Hematocrit 38.1 %      MCV 85 fL MCH 27.9 pg      MCHC 32.8 g/dL      RDW 15.2 %      MPV 10.7 fL      Platelets 733 Thousands/uL      nRBC 0 /100 WBCs      Neutrophils Relative 75 %      Immat GRANS % 1 %      Lymphocytes Relative 17 %      Monocytes Relative 5 %      Eosinophils Relative 1 %      Basophils Relative 1 %      Neutrophils Absolute 7.31 Thousands/µL      Immature Grans Absolute 0.09 Thousand/uL      Lymphocytes Absolute 1.70 Thousands/µL      Monocytes Absolute 0.52 Thousand/µL      Eosinophils Absolute 0.14 Thousand/µL      Basophils Absolute 0.05 Thousands/µL     Urine culture [[id number]] Collected: 10/18/23 2000    Lab Status: In process Specimen: Urine, Other Updated: 10/18/23 2005                   No orders to display         Procedures  Procedures      ED Course  ED Course as of 10/18/23 2151   Wed Oct 18, 2023   1952 Temperature: 99.1 °F (37.3 °C)   1952 Patient resting comfortably. Afebrile. Stoma site of ileal conduit appears to be beefy red and healthy without signs of necrosis or infection. There is clear/mucousy discharge noted, however no purulent drainage. Patient with chronic abdominal tenderness, and recently treated for pyelonephritis which was thought to be more MSK related after I&D evaluation. Given abdominal tenderness to palpation, will recheck labs to assess for acute abnormality. Patient was advised that her urine analysis will likely continue given colonization of able conduit, however will order cultures. 2012 WBC: 9.81   2012 Hemoglobin: 12.5   2012 CBC and differential(!)  Leukocytosis or anemia. Platelets WNL. No gross abnormality. 2051 Sodium(!): 133   2052 Creatinine(!): 1.65  Slightly elevated appears to be around patient's baseline. 2052 Glucose, Random(!): 361   2103 Reviewed all results with patient, no acute abnormality on lab work. Patient's stoma appears healthy and beefy red without signs of infection.   Patient was advised to follow-up with a urologist.  Reviewed strict return precautions with patient and she is agreeable with discharge plan. Medical Decision Making  Patient with history as above presented with Patient presents with:  Medical Problem: Pt presents c/o "reddening of stoma" states her urine is less and less often and states she has some abd discomfort  . Hx obtained from pt     Patient was nontoxic, stable. Ambulatory. Exam as above. 55-year-old female with history ileal conduit status post bladder cancer presenting with darkening of her stoma noticed last night. Patient also presenting with diffuse abdominal pain, consistent with her chronic abdominal pain which has been thoroughly worked up recently in the past few months. Patient states that it was initially thought to be pyelonephritis however after I&D evaluation it was thought to be more MSK related. On exam patient is afebrile, nontoxic-appearing, there is mild abdominal tenderness without peritoneal signs but no CVA tenderness and the stoma looks healthy without overlying signs of infection. Patient declining any pain medicine at this time. She is having regular bowel movements, states that been watery and she was advised to increase her fiber supplementation. Shared decision making was made to hold off on further imaging at this time as pain is mild and consistent with prior, will evaluate with CBC and CMP and if any gross abnormality will consider further work-up. Explained to patient that her stoma appears to be healthy without any signs of ischemia, necrosis or infection. There is a small granuloma around the stoma site and this was explained to the patient as a benign finding likely infection. CBC and CMP otherwise unremarkable, patient does not appear septic or acutely ill. Given stoma, UA likely to be chronically contaminated, will send urine for cultures. Patient otherwise stable for discharge with outpatient follow-up with urology.   Reviewed strict return precautions with patient along with care instructions for her stoma and she is agreeable with discharge plan. I have independently ordered, reviewed and interpreted the following lab and/or imaging studies listed above. Reviewed external records including notes, and prior labs/imaging results. Differential diagnosis considered including but not limited to stoma complication, MSK strain unlikely pyelonephritis, cystitis, intra-abdominal infection, SBO, urolithiasis,. Overall presentation is consistent with normal-appearing stoma with benign granuloma. Consideration was given for admission, but the patient was stable for outpatient management. Disposition: Discussed need to follow up diagnostics, including incidental findings. Discharged with instructions to obtain outpatient follow up of patient's symptoms and findings, with strict return precautions if patient develops new or worsening symptoms. Amount and/or Complexity of Data Reviewed  Labs: ordered. Decision-making details documented in ED Course. Disposition  Final diagnoses:   Complication of Ileal conduit ([address]) - pinpoint granuloma     Time reflects when diagnosis was documented in both MDM as applicable and the Disposition within this note       Time User Action Codes Description Comment    10/18/2023  7:47 PM Chidi Yahir Add [M78.260] Complication of Ileal conduit ([address])     10/18/2023  8:11 PM Ronan Barnes Modify [Q24.525] Complication of Ileal conduit [facility]) pinpoint granuloma          ED Disposition       ED Disposition   Discharge    Condition   Stable    Date/Time   Wed Oct 18, 2023  8:53 PM    [address] discharge to home/self care.                    Follow-up Information       Follow up With Specialties Details Why [id number] Sola Castro MD Internal Medicine  As needed [id number] [address] [facility] [address] [facility]      Linnie Scheuermann, MD Urology  Please call tomorrow to schedule an appointment [id number] [address] [address]  [phone]              Discharge Medication List as of 10/18/2023  8:53 PM        CONTINUE these medications which have NOT CHANGED    Details   atenolol (TENORMIN) 25 mg tablet 1 daily, Historical Med      Cholecalciferol (VITAMIN D3 GUMMIES ADULT PO) Historical Med      clonazePAM (KlonoPIN) 0.5 mg tablet Take 0.5 mg by mouth 3 (three) times a day, Historical Med      clotrimazole (LOTRIMIN) 1 % cream Apply topically as needed, Historical Med      Continuous Blood Gluc  (FreeStyle [place] 2 [place]) EDSON Historical Med      Continuous Blood Gluc Sensor (FreeStyle Laureen 2 Sensor) MISC Historical Med      Dulaglutide (Trulicity) 8.32 PM/5.9LM SOPN Inject 0.75 mg under the skin Once a week, Starting Thu 10/13/2022, Historical Med      glucose blood test strip Freestyle Lite test strips. Test 8XD. , Historical Med      lamoTRIgine (LaMICtal) 200 MG tablet TAKE 1 TABLET TWICE DAILY., Normal      losartan (COZAAR) 25 mg tablet TAKE 1 TABLET BY MOUTH 1 TIME EACH DAY., Historical Med      medroxyPROGESTERone acetate (DEPO-PROVERA SYRINGE) 150 mg/mL injection INJECT 150 MG INJECT INTO THE MUSCLE EVERY 3 (THREE) MONTHS. , Historical Med      metFORMIN (GLUCOPHAGE-XR) 500 mg 24 hr tablet Take 1,000 mg by mouth 2 (two) times a day  , Historical Med      omeprazole (PriLOSEC) 40 MG capsule Historical Med      potassium citrate (UROCIT-K) 10 mEq PLEASE SEE ATTACHED FOR DETAILED DIRECTIONS, Historical Med      pravastatin (PRAVACHOL) 40 mg tablet Take 40 mg by mouth, Starting Fri 10/19/2018, Historical Med      terconazole (TERAZOL 3) 0.8 % vaginal cream Insert 1 applicator into the vagina daily at bedtime, Starting Wed 10/11/2023, Normal      Tresiba FlexTouch 200 units/mL CONCENTRATED U-200 injection pen INJECT 140 UNITS UNDER THE SKIN DAILY. , Historical Med      venlafaxine (EFFEXOR-XR) 75 mg 24 hr capsule Take 75 mg by mouth every evening, Historical Med           No discharge procedures on file. PDMP Review       None             ED Provider  Attending physically available and evaluated Andrew Siemens. I managed the patient along with the ED Attending.     Electronically Signed by           Karen Oliveira DO  10/18/23 8265

## 2023-10-18 NOTE — TELEPHONE ENCOUNTER
I spoke to client states her urostomy stoma color changed and is burgundy in color and is not the same color that it has been in the past 3 years. At base line stoma is beefy red. The stoma appears moist, no dryness, it is draining, however client reports decreased urine output. Client states they looked again and stoma looks even darker. I advised client to go to ED or urgent care for further evaluation to rule out possible decrease in circulation to the stoma. Client agreeable and wanted to have Dr. Maggie Montano see her, explained that at this time offices are closed and she would need to go to ED for evaluation and then we will follow up after. I also advised in future messages sent through [facility] can attach photos.

## 2023-10-18 NOTE — TELEPHONE ENCOUNTER
Pt states that she had surgery on 10/09/23 but now her stoma looks a little red and she is not sure what she should do. She states that her stomach has been bloated and just not a color it usually is.      Pt can be reached at [phone]

## 2023-10-18 NOTE — DISCHARGE INSTRUCTIONS
Today you are seen for ileal conduit darkening. On examination your stoma appeared to look healthy, beefy and red. There appears to be no signs of infection to the stoma or necrosis to the stoma. Some mucousy discharge is normal.  Please follow-up with your urologist as scheduled if not sooner. Please return to the emergency department if you experience any fevers, worsening/unbearable abdominal pain, vomiting, chest pain, shortness of breath, black appearing stoma, pus/green drainage from the stoma, redness around the stoma site, pain around the stoma site, warmth around the stoma site or swelling around the stoma site. Call your urologist about any of the following:     Skin Problems If you don’t have a tight seal around your stoma-- which can result in urine touching your skin--your skin might become very red and sore and start exuding fluid. To prevent skin problems, keep a watertight seal around your stoma and keep the surrounding skin clean and dry. Change in Stoma Size, Shape or Position   The stoma might prolapse (retract so it sits closer to-- or below--the surface of the skin on your abdomen). Or it might start to protrude farther above the abdominal surface. Also, a peristomal hernia (a hernia around the stoma) can occur. If any of those kinds of changes occur, you need your pouching system changed to create a proper seal again. You might also need surgical correction of the prolapse or hernia. Stenosis   A stenosis is a narrowing or tightening of the stoma at or below the skin level. You might need surgery to correct a stenosis. Stenosis creates pressure toward your kidneys and places them at risk for damage.

## 2023-10-18 NOTE — ED ATTENDING ATTESTATION
10/18/2023  ILeslie MD, saw and evaluated the patient. I have discussed the patient with the resident/non-physician practitioner and agree with the resident's/non-physician practitioner's findings, Plan of Care, and MDM as documented in the resident's/non-physician practitioner's note, except where noted. All available labs and Radiology studies were reviewed. I was present for key portions of any procedure(s) performed by the resident/non-physician practitioner and I was immediately available to provide assistance. At this point I agree with the current assessment done in the Emergency Department. I have conducted an independent evaluation of this patient a history and physical is as follows:    S:  Chief Complaint   Patient presents with    Medical Problem     Pt presents c/o "reddening of stoma" states her urine is less and less often and states she has some abd discomfort     Koffi Ramos is a 50 y.o. female who presents for evaluation with the chief complaint of a darker stoma which has since improved. She had reached out to the urologist's office and was referred here for further evaluation. She shows me a picture on her phone which was difficult to  due to the poor lighting that shows a maroonish colored stoma. Here today her stoma appears brighter (may just be a function of better lighting). She states there is mucus coming from the stoma which she reports is unusual for her. No fevers or chills. O:  ED Triage Vitals [10/18/23 1739]   Temperature Pulse Respirations Blood Pressure SpO2   99.1 °F (37.3 °C) 92 18 127/80 98 %      Temp Source Heart Rate Source Patient Position - Orthostatic VS BP Location FiO2 (%)   Oral Monitor Sitting Right arm --      Pain Score       --         Physical Exam  Vitals and nursing note reviewed. Constitutional:       General: She is in acute distress (anxious). Appearance: She is well-developed. She is obese.    HENT:      Head: Normocephalic and atraumatic. Eyes:      Extraocular Movements: Extraocular movements intact. Pupils: Pupils are equal, round, and reactive to light. Neck:      Vascular: No JVD. Cardiovascular:      Rate and Rhythm: Normal rate and regular rhythm. Heart sounds: Normal heart sounds. No murmur heard. No friction rub. No gallop. Pulmonary:      Effort: Pulmonary effort is normal. No respiratory distress. Breath sounds: Normal breath sounds. No wheezing or rales. Chest:      Chest wall: No tenderness. Abdominal:      Comments: Stoma appears beefy red, there is a small pinpoint granuloma at the 2 O'Clock position   Musculoskeletal:         General: No tenderness. Normal range of motion. Cervical back: Normal range of motion. Skin:     General: Skin is warm and dry. Neurological:      General: No focal deficit present. Mental Status: She is alert and oriented to person, place, and time. Psychiatric:         Mood and Affect: Mood is anxious. Behavior: Behavior normal.         Thought Content:  Thought content normal.         Judgment: Judgment normal.       A/P:  Background: 50 y.o. female presents with possible stoma discoloration, mucus from stoma    Differential DX includes but is not limited to: poor lighting, doubt vascular compromise of stoma; mucus is normal amount and to be expected    Plan: cbc, bmp, urine culture - refer back to urology for outpatient followup      ED Course     Labs Reviewed   CBC AND DIFFERENTIAL - Abnormal       Result Value Ref Range Status    WBC 9.81  4.31 - 10.16 Thousand/uL Final    RBC 4.48  3.81 - 5.12 Million/uL Final    Hemoglobin 12.5  11.5 - 15.4 g/dL Final    Hematocrit 38.1  34.8 - 46.1 % Final    MCV 85  82 - 98 fL Final    MCH 27.9  26.8 - 34.3 pg Final    MCHC 32.8  31.4 - 37.4 g/dL Final    RDW 15.2 (*) 11.6 - 15.1 % Final    MPV 10.7  8.9 - 12.7 fL Final    Platelets 283  648 - 390 Thousands/uL Final    nRBC 0  /100 WBCs Final    Neutrophils Relative 75  43 - 75 % Final    Immat GRANS % 1  0 - 2 % Final    Lymphocytes Relative 17  14 - 44 % Final    Monocytes Relative 5  4 - 12 % Final    Eosinophils Relative 1  0 - 6 % Final    Basophils Relative 1  0 - 1 % Final    Neutrophils Absolute 7.31  1.85 - 7.62 Thousands/µL Final    Immature Grans Absolute 0.09  0.00 - 0.20 Thousand/uL Final    Lymphocytes Absolute 1.70  0.60 - 4.47 Thousands/µL Final    Monocytes Absolute 0.52  0.17 - 1.22 Thousand/µL Final    Eosinophils Absolute 0.14  0.00 - 0.61 Thousand/µL Final    Basophils Absolute 0.05  0.00 - 0.10 Thousands/µL Final   BASIC METABOLIC PANEL - Abnormal    Sodium 133 (*) 135 - 147 mmol/L Final    Potassium 4.1  3.5 - 5.3 mmol/L Final    Chloride 107  96 - 108 mmol/L Final    CO2 15 (*) 21 - 32 mmol/L Final    ANION GAP 11  mmol/L Final    BUN 35 (*) 5 - 25 mg/dL Final    Creatinine 1.65 (*) 0.60 - 1.30 mg/dL Final    Comment: Standardized to IDMS reference method    Glucose 361 (*) 65 - 140 mg/dL Final    Comment: If the patient is fasting, the ADA then defines impaired fasting glucose as > 100 mg/dL and diabetes as > or equal to 123 mg/dL.     Calcium 9.2  8.4 - 10.2 mg/dL Final    eGFR 36  ml/min/1.73sq m Final    Narrative:     [facility] guidelines for Chronic Kidney Disease (CKD):     Stage 1 with normal or high GFR (GFR > 90 mL/min/1.73 square meters)    Stage 2 Mild CKD (GFR = 60-89 mL/min/1.73 square meters)    Stage 3A Moderate CKD (GFR = 45-59 mL/min/1.73 square meters)    Stage 3B Moderate CKD (GFR = 30-44 mL/min/1.73 square meters)    Stage 4 Severe CKD (GFR = 15-29 mL/min/1.73 square meters)    Stage 5 End Stage CKD (GFR <15 mL/min/1.73 square meters)  Note: GFR calculation is accurate only with a steady state creatinine   URINE CULTURE         Critical Care Time  Procedures    Time reflects when diagnosis was documented in both MDM as applicable and the Disposition within this note       Time User Action Codes Description Comment    10/18/2023  7:47 PM Ama Berumen Add [K08.837] Complication of Ileal conduit ([address])     10/18/2023  8:11 PM Layla Mcmanus Modify [L55.040] Complication of Ileal conduit [facility]) pinpoint granuloma          ED Disposition       None          Follow-up Information       Follow up With Specialties Details Why [id number] Sola Castro MD Internal Medicine  As needed [id number] [facility] [id number] [phone]      João Mendoza MD Urology  Please call tomorrow to schedule an appointment [id number] [address]  [phone]

## 2023-10-19 NOTE — TELEPHONE ENCOUNTER
Agree with ER evaluation. Difficult to assess stoma based on my chart images. Stoma does appear beefy red however there is slight color change to the center. Dr. Patrice Ross is currently out of the office.

## 2023-10-20 LAB
BACTERIA UR CULT: ABNORMAL
BACTERIA UR CULT: ABNORMAL

## 2023-10-30 ENCOUNTER — OFFICE VISIT (OUTPATIENT)
Dept: UROLOGY | Facility: CLINIC | Age: 48
End: 2023-10-30
Payer: MEDICARE

## 2023-10-30 VITALS
SYSTOLIC BLOOD PRESSURE: 112 MMHG | OXYGEN SATURATION: 99 % | HEART RATE: 111 BPM | HEIGHT: 65 IN | TEMPERATURE: 98.2 F | WEIGHT: 241 LBS | BODY MASS INDEX: 40.15 KG/M2 | DIASTOLIC BLOOD PRESSURE: 76 MMHG

## 2023-10-30 DIAGNOSIS — Z93.6 S/P ILEAL CONDUIT (HCC): ICD-10-CM

## 2023-10-30 DIAGNOSIS — N39.0 RECURRENT UTI: Primary | ICD-10-CM

## 2023-10-30 PROCEDURE — 99213 OFFICE O/P EST LOW 20 MIN: CPT

## 2023-10-30 RX ORDER — DICYCLOMINE HCL 20 MG
TABLET ORAL
COMMUNITY
Start: 2023-10-28

## 2023-10-30 NOTE — PROGRESS NOTES
10/30/2023    No chief complaint on file. Assessment and Plan    50 y.o. female     1. 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 I agree that her bilateral back pain is likely musculoskeletal in nature as she has point tenderness with negative CVA tenderness. Pain also worsen's with movement as well. In regards to her complaints of subjective chills, sweats, and fevers I believe this is likely related to perimenopausal symptoms and not systemic signs or symptoms of infection. In regards to her nausea and generalized abdominal pain this also seems to be chronic and reports issues with constipation. I reinforced the importance of avoiding overtreatment of positive urine cultures in the absence of systemic signs or symptoms of infection. Her stoma is healthy appearing today and is red in color. There is some mucus however this is only small amounts. I reassured her that this is normal and she should continue to hydrate well with water. From my standpoint she is doing very well. She is very anxious and I reassured her she is doing fine and not to obsess over her positive urine cultures. We will see her back in approximately 3 months with Dr. Domonique Lopez. Subjective:    She presents today with her  complaining of chronic bilateral back pain as well as generalized abdominal pain which also seems chronic as well. She is very anxious about all of her recent UTIs and is concerned about the amount of antibiotics she has been prescribed. She describes her back/flank pain as a dull ache and will occasional experience nausea as well. She reports subjective low grade fevers of 98 °F and reports baseline temperature of "95 degrees" Fahrenheit. She also reports intermittent sweats but thinks this may be attributed to perimenopause. Exe-Yosef ill-appearing today.   Her stoma is healthy appearing and red with some mucus. Urine is clear yellow. History of Present Illness  Keegan Berg is a 50 y.o. female here for hospital follow-up evaluation acute pyelonephritis and recurrent UTIs. Patient of Dr. Carlos Aldrich last seen 5/12/2023 for annual visit with history of bladder cancer status post radical cystectomy 0201 with complications afterwards leading to an ileal conduit and reconstruction of the pelvis with neoadjuvant chemotherapy. She still has her uterus and both ovaries. Dr. Verner Fabian performed vaginoscopy and urethral stump visualization with the cystoscope November 18, 2022. All of these look normal.  There were no signs of infection, trauma or anything else. Dr. 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 Surveillance CT imaging has been negative for disease recurrence or metastatic spread. She has been undergoing a lot of nerve root injections. Serum creatinine was 1.65 10/18/2023. The nerve blocks are taking place in the sacral region. The injections are helping with the pain. "I am getting better". She is describing possible uterine prolapse. She continues to have discharge from the vagina. Following her last office visit with Dr. Verner Fabian she has had multiple ER visits and hospital admissions for presumed pyelonephritis. This started in July 2023 when she was hospitalized at [address] [place] [address] of flank pain and nausea. Culture that time was positive for Proteus she was discharged with 14-day course of ciprofloxacin. CT imaging showed postsurgical changes from prior cystectomy with ileal loop conduit in the right mid abdomen. No evidence of hydronephrosis bilaterally. She was seen at [address] [place] [address] in August as well for complaints of vaginal bleeding and stinging/burning sensation. Urine culture was positive for Enterococcus and Stenotrophomonas.  She reached to our office to notify us of those results and reported that she was discharged on Amoxicillin and later switched to Levofloxacin by Dr. Christina Roland. Seen again in September at [address] [place] [address] emergency department for UTI/pyelonephritis as well as [address] [place] [address]. Most recently she was admitted at [address] [place] [address] from 10/09-10/11 for complaints of b/l lower back believed to be intially secondary to pyelonephritis. She received 3 dose of IV Zosyn and was evaluated by ID. ID felt patients symptoms were likely musculoskeletal and not related to pyelonephritis and recommending stopping antibiotics. As she has a ileal conduit her urine cultures are expected to show some bacteria due to colonization. She was also seen at [facility] emergency department on 10/18 due to concerns regarding reddening of her stoma. Exam was unremarkable and labs were WNL and she was discharge with recommendations to follow-up with urology. Review of Systems   Constitutional:  Negative for chills and fever. HENT:  Negative for ear pain and sore throat. Eyes:  Negative for pain and visual disturbance. Respiratory:  Negative for cough and shortness of breath. Cardiovascular:  Negative for chest pain and palpitations. Gastrointestinal:  Positive for nausea. Negative for abdominal pain and vomiting. Genitourinary:  Positive for flank pain. Negative for dysuria and hematuria. Musculoskeletal:  Negative for arthralgias and back pain. Skin:  Negative for color change and rash. Neurological:  Negative for seizures and syncope. Psychiatric/Behavioral:  The patient is nervous/anxious. All other systems reviewed and are negative. Vitals  There were no vitals filed for this visit. Physical Exam  Vitals reviewed. Constitutional:       General: She is not in acute distress. Appearance: Normal appearance. She is normal weight. She is not ill-appearing or toxic-appearing.    HENT:      Head: Normocephalic and atraumatic. Nose: Nose normal.   Eyes:      General: No scleral icterus. Conjunctiva/sclera: Conjunctivae normal.   Cardiovascular:      Rate and Rhythm: Normal rate. Pulses: Normal pulses. Pulmonary:      Effort: Pulmonary effort is normal. No respiratory distress. Abdominal:      General: Abdomen is flat. Palpations: Abdomen is soft. Tenderness: There is no abdominal tenderness. There is no right CVA tenderness or left CVA tenderness. Hernia: No hernia is present. Genitourinary:     Comments: Right lower quadrant stoma healthy appearing, red, small amount of mucus. Urine is clear yellow. Musculoskeletal:         General: Normal range of motion. Cervical back: Normal range of motion. Skin:     General: Skin is warm and dry. Neurological:      General: No focal deficit present. Mental Status: She is alert and oriented to person, place, and time. Mental status is at baseline. Psychiatric:         Mood and Affect: Mood normal.         Behavior: Behavior normal.         Thought Content:  Thought content normal.         Judgment: Judgment normal.         Past History  Past Medical History:   Diagnosis Date    Anxiety     Arthritis of lumbar spine     Asthma     "mild"    Bilateral dry eyes     Bilateral shoulder pain     with right arm pain from neck    Bipolar disorder (HCC)     Bladder CA in situ     Bulging of cervical intervertebral disc     C7 and C8    Bursitis of hip, right     Cancer (HCC)     bladder    Carpal tunnel syndrome, bilateral     Cataract     right    Cervical spine arthritis     Chronic pain disorder     Colitis     Cystitis, chronic     Dental crown present     Depression     Deviated septum     Diabetes mellitus ([address])     Family history of factor V Leiden mutation     Mom has;  pt reports " I tested negative"    Fatty liver     GERD (gastroesophageal reflux disease)     Grinding teeth     High cholesterol     History of heart block     "from reaction to Lithium"    History of recent fall     3 weeks ago or so fell on ice    HPV (human papilloma virus) infection     "dormant"    Hypertension     Irritable bowel syndrome     Left knee injury     "years ago"    Nicotine dependence     Psychiatric disorder     Panic disorder/none in a long time    Rotator cuff tendinitis     bilat    Seizures ([address])     "diagnosed with tonic/clonic seizures from coming off lithium but since on lamictal no seizure x 10 years"    Shortness of breath     mild    Sinus infection     saw Dr Tracy Cordon  and is taking antibiotics    Wears glasses      Social History     Socioeconomic History    Marital status: /Civil Union     Spouse name: Not on file    Number of children: Not on file    Years of education: Not on file    Highest education level: Not on file   Occupational History    Not on file   Tobacco Use    Smoking status: Former     Packs/day: 1.50     Years: 16.00     Total pack years: 24.00     Types: Cigarettes     Quit date: 10/31/2019     Years since quittin.0    Smokeless tobacco: Never    Tobacco comments:     has been trying to quit   Vaping Use    Vaping Use: Never used   Substance and Sexual Activity    Alcohol use: No    Drug use: No    Sexual activity: Not on file   Other Topics Concern    Not on file   Social History Narrative    Not on file     Social Determinants of Health     Financial Resource Strain: Not on file   Food Insecurity: Not on file   Transportation Needs: Not on file   Physical Activity: Not on file   Stress: Not on file   Social Connections: Not on file   Intimate Partner Violence: Not on file   Housing Stability: Not on file     Social History     Tobacco Use   Smoking Status Former    Packs/day: 1.50    Years: 16.00    Total pack years: 24.00    Types: Cigarettes    Quit date: 10/31/2019    Years since quittin.0   Smokeless Tobacco Never   Tobacco Comments    has been trying to quit     Family History   Problem Relation Age of Onset    Aneurysm Father     Vascular Disease Mother     Depression Mother     Anxiety disorder Mother     Alzheimer's disease Paternal Grandmother     Prostate cancer Paternal Grandfather     Parkinsonism Paternal Grandfather     Bipolar disorder Brother     Diabetes Family        The following portions of the patient's history were reviewed and updated as appropriate allergies, current medications, past medical history, past social history, past surgical history and problem list    [email or web address]  CT ABDOMEN AND PELVIS WITH IV CONTRAST     INDICATION:   diffuse R sided abdominal pain, known R sided ileal conduit. COMPARISON:  None. TECHNIQUE:  CT examination of the abdomen and pelvis was performed. Multiplanar 2D reformatted images were created from the source data. This examination, like all CT scans performed in the [facility], was performed utilizing techniques to minimize radiation dose exposure, including the use of iterative reconstruction and automated exposure control. Radiation dose length   product (DLP) for this visit:  2086 mGy-cm     IV Contrast:  100 mL of iohexol (OMNIPAQUE)  Enteric Contrast:  Enteric contrast was not administered. FINDINGS:     ABDOMEN     LOWER CHEST:  No clinically significant abnormality identified in the visualized lower chest.     LIVER/BILIARY TREE: Hepatomegaly with diffuse fatty infiltration. No suspicious hepatic lesion. No biliary dilatation. GALLBLADDER: Gallbladder is surgically absent. SPLEEN: Mild splenomegaly. PANCREAS:  Unremarkable. ADRENAL GLANDS:  Unremarkable. KIDNEYS/URETERS: Symmetric nephrograms. No hydronephrosis or urinary tract calculus. Bilateral renal subcentimeter hypodensities too small to accurately characterize. STOMACH AND BOWEL: Status post cystectomy and ileal conduit in the right lower abdomen. Mild fluid distention along the anastomosis in the right lower abdomen.  No bowel obstruction. APPENDIX:  No findings to suggest appendicitis. ABDOMINOPELVIC CAVITY:  No ascites. No pneumoperitoneum. No lymphadenopathy. VESSELS:  Unremarkable for patient's age. PELVIS     REPRODUCTIVE ORGANS:  Unremarkable for patient's age. URINARY BLADDER: Cystectomy. ABDOMINAL WALL/INGUINAL REGIONS: Postsurgical changes in the right ventral wall. Small fat-containing parastomal hernia. Small nodular areas and locules of air in the left abdominal subcutaneous fat may represent the sequela of medication injections. OSSEOUS STRUCTURES:  No acute fracture or destructive osseous lesion. IMPRESSION:     No acute findings in the abdomen or pelvis. Postsurgical changes of cystectomy and ileal conduit. Hepatic steatosis. Results  No results found for this or any previous visit (from the past 1 hour(s)). ]  No results found for: "PSA"  Lab Results   Component Value Date    GLUCOSE 134 03/08/2015    CALCIUM 9.2 10/18/2023     03/08/2015    K 4.1 10/18/2023    CO2 15 (L) 10/18/2023     10/18/2023    BUN 35 (H) 10/18/2023    CREATININE 1.65 (H) 10/18/2023     Lab Results   Component Value Date    WBC 9.81 10/18/2023    HGB 12.5 10/18/2023    HCT 38.1 10/18/2023    MCV 85 10/18/2023     10/18/2023       Please Note:  Voice dictation software has been used to create this document. There may be inadvertent transcriptions errors.      ERIN Rodgers 10/30/23

## 2024-01-11 ENCOUNTER — TELEPHONE (OUTPATIENT)
Age: 49
End: 2024-01-11

## 2024-01-11 NOTE — TELEPHONE ENCOUNTER
Patient seen by cory at [place]    Patient called stating she got a call from [facility] and she was told her ostomy supplies are no longer cover with her insurance.  She stated she is not sure why .  She stated there were some changes with it in regards to dual coverage with [employer] medicare assured.  She is running out of supplies .  She will call her insurance and speak to the pharmacy department to see what is going on.  She will call back and let the office know the outcome. She stated [facility] left information for office to call them in regards to order faxed to them.       Taylor ALARCON from [facility]  [phone] ext [phone]

## 2024-03-11 ENCOUNTER — OFFICE VISIT (OUTPATIENT)
Dept: UROLOGY | Facility: CLINIC | Age: 49
End: 2024-03-11
Payer: MEDICARE

## 2024-03-11 VITALS
OXYGEN SATURATION: 97 % | HEIGHT: 65 IN | SYSTOLIC BLOOD PRESSURE: 108 MMHG | BODY MASS INDEX: 39.89 KG/M2 | RESPIRATION RATE: 16 BRPM | WEIGHT: 239.4 LBS | TEMPERATURE: 97.5 F | DIASTOLIC BLOOD PRESSURE: 68 MMHG | HEART RATE: 86 BPM

## 2024-03-11 DIAGNOSIS — N39.0 RECURRENT UTI: Primary | ICD-10-CM

## 2024-03-11 LAB
SL AMB  POCT GLUCOSE, UA: NORMAL
SL AMB LEUKOCYTE ESTERASE,UA: NORMAL
SL AMB POCT BILIRUBIN,UA: NORMAL
SL AMB POCT BLOOD,UA: NORMAL
SL AMB POCT CLARITY,UA: CLEAR
SL AMB POCT COLOR,UA: YELLOW
SL AMB POCT KETONES,UA: NORMAL
SL AMB POCT NITRITE,UA: NORMAL
SL AMB POCT PH,UA: 7.5
SL AMB POCT SPECIFIC GRAVITY,UA: 1
SL AMB POCT URINE PROTEIN: NORMAL
SL AMB POCT UROBILINOGEN: 0.2

## 2024-03-11 PROCEDURE — 99214 OFFICE O/P EST MOD 30 MIN: CPT | Performed by: UROLOGY

## 2024-03-11 PROCEDURE — 81002 URINALYSIS NONAUTO W/O SCOPE: CPT | Performed by: UROLOGY

## 2024-03-11 RX ORDER — INSULIN LISPRO 100 [IU]/ML
8 INJECTION, SOLUTION INTRAVENOUS; SUBCUTANEOUS
COMMUNITY

## 2024-03-11 NOTE — PROGRESS NOTES
UROLOGY PROGRESS NOTE   [facility]  [facility] [facility] [address]  [address]  [place] [address]  [phone]  Fax:[phone]  [email or web address]      NAME: Kaylin Yi  AGE: 48 y.o. SEX: female  : 1975   MRN: [id number]    DATE: 3/11/2024  TIME: 1:49 PM    Assessment and Plan:    Bladder CA s/p cystectomy and ileal conduit, ECTOR so far- CT scans are frequent, follow up re this depends on imaging she gets in future.    Chronic flank pain - resolved right now, possible neuropathy- she believes they are chronic pyelonephritis, difficult to prove. Avoid overtx w/ abx. Has phantom bladder pain- this will take years to resolve.Continues with pudendal nerve blocks with Dr Irizarry every 8 weeks.  I placed an order for urine cultures as a standing order where she can have 1 done once every 4 weeks if needed.    F/U 6 months with me for reassessment.               Chief Complaint     Chief Complaint   Patient presents with    Follow-up       History of Present Illness   Follow-up bladder cancer, muscle-invasive status post radical cystectomy and ileal conduit with neoadjuvant chemotherapy, uterus and ovaries were left intact 5 years ago-has been having a number of symptoms, with subjective chills sweats and fevers, bilateral back pain, vaginal pain-has pelvic pain being managed with sacral pudendal nerve blocks in the past. The back pain she was experiencing is pretty much gone now. The pain, which was burning bilateral flank pain, sounds more like diabetic neuropathy pain. She does have some form of bilateral LE vasomotor erythema with blanching - had normal physiologic VAS study 3/.  We had another discussion regarding all of this regarding possible neuropathic pain, possible chronic pyelonephritis, etc. reiterated that is difficult to diagnose or to rule in or rule out because the ileal conduit.  Not a good methenamine candidate due to inabilty to take Vit C.      The following portions of the  "patient's history were reviewed and updated as appropriate: allergies, current medications, past family history, past medical history, past social history, past surgical history and problem list.  Past Medical History:   Diagnosis Date    Abnormal Pap smear of cervix     18 yrs old now negative since HPV    Anxiety     Arthritis of lumbar spine     Asthma     \"mild\"    Bilateral dry eyes     Bilateral shoulder pain     with right arm pain from neck    Bipolar disorder (HCC)     Bladder CA in situ     Bladder cancer (HCC)     I had 2015 diagnosed in [facility] since 2019    Bulging of cervical intervertebral disc     C7 and C8    Bursitis of hip, right     Cancer (HCC)     bladder    Carpal tunnel syndrome, bilateral     Cataract     right    Cervical spine arthritis     Chronic kidney disease     CKD Dr [facility]  1 yr ago diagnosed    Chronic pain disorder     Colitis     Cystitis, chronic     Dental crown present     Depression     Deviated septum     Diabetes mellitus ([facility])     Family history of factor V Leiden mutation     Mom has;  pt reports \" I tested negative\"    Fatty liver     GERD (gastroesophageal reflux disease)     Grinding teeth     High cholesterol     History of heart block     \"from reaction to Lithium\"    History of recent fall     3 weeks ago or so fell on ice    HPV (human papilloma virus) infection     \"dormant\"    Hypertension     Interstitial cystitis     Had since 5 urethra Stricture fixed    Irritable bowel syndrome     Left knee injury     \"years ago\"    Nicotine dependence     Psychiatric disorder     Panic disorder/none in a long time    Pyelonephritis     Recently    Renal cancer ([facility])     Rotator cuff tendinitis     bilat    Seizures ([facility])     \"diagnosed with tonic/clonic seizures from coming off lithium but since on lamictal no seizure x 10 years\"    Shortness of breath     mild    Sinus infection     saw Dr Stapleton 2/18 and is taking antibiotics    Urinary incontinence     All " "my life but no bladder anymore    Urinary tract infection     Have ao many times in my life    Vaginal infection     Gwt yeast when take antibotics prone and diabetic    Wears glasses      Past Surgical History:   Procedure Laterality Date    BLADDER SURGERY N/A     CHOLECYSTECTOMY      COLON SURGERY      Removal neobladder and IC lost about 2 ans half feet of colon    CYSTECTOMY      Oct 2019    CYSTOSCOPY      Many when had cancer    ESOPHAGOGASTRODUODENOSCOPY      GYNECOLOGIC CRYOSURGERY      \"for HPV\"    INDUCED       \"age 18\"    INSTILLATION MYTOMYCIN N/A 2019    Procedure: INSTILLATION MITOMYCIN;  Surgeon: Sebastian Cabezas MD;  Location: [facility];  Service: Urology    NE CYSTO W/REMOVAL OF LESIONS SMALL N/A 2019    Procedure: TRANSURETHRAL RESECTION OF BLADDER TUMOR (TURBT);  Surgeon: Sebastian Cabezas MD;  Location: [facility];  Service: Urology    URETHRA SURGERY          WISDOM TOOTH EXTRACTION         Review of Systems   Review of Systems   Genitourinary:         Has ileal conduit       Active Problem List     Patient Active Problem List   Diagnosis    Simple partial seizures (HCC)    Chronic right shoulder pain    Rotator cuff tendonitis, right    Right hip pain    Trochanteric bursitis of right hip    Rotator cuff tendonitis, left    Chronic left shoulder pain    Radiculopathy, cervical region    Malignant neoplasm of urinary bladder (HCC)    Malignant neoplasm of ureteric orifice (HCC)    Essential hypertension    Dyslipidemia    Type 2 diabetes mellitus without complication, without long-term current use of insulin (HCC)    Tobacco use    Uncontrolled type 2 diabetes mellitus with hyperglycemia (HCC)    Stage 3a chronic kidney disease (HCC)    Obesity, morbid (HCC)    Lower back pain    Hyperlipidemia    Erythema of groin       Objective   /68 (BP Location: Right arm, Patient Position: Sitting, Cuff Size: Large)   Pulse 86   Temp 97.5 °F (36.4 °C) (Temporal)   Resp 16   Ht 5' " "5\" (1.651 m)   Wt 109 kg (239 lb 6.4 oz)   SpO2 97%   BMI 39.84 kg/m²     Physical Exam  Vitals reviewed.   Constitutional:       Appearance: Normal appearance.   HENT:      Head: Normocephalic and atraumatic.   Eyes:      Extraocular Movements: Extraocular movements intact.   Pulmonary:      Effort: Pulmonary effort is normal.   Abdominal:      Comments: Ileal conduit is pink prolapsed and healthy   Musculoskeletal:         General: Normal range of motion.      Cervical back: Normal range of motion.   Skin:     Coloration: Skin is not jaundiced or pale.   Neurological:      General: No focal deficit present.      Mental Status: She is alert and oriented to person, place, and time. Mental status is at baseline.   Psychiatric:         Mood and Affect: Mood normal.         Behavior: Behavior normal.         Thought Content: Thought content normal.         Judgment: Judgment normal.             Current Medications     Current Outpatient Medications:     atenolol (TENORMIN) 25 mg tablet, 1 daily, Disp: , Rfl:     Cholecalciferol (VITAMIN D3 GUMMIES ADULT PO), 200 mg, Disp: , Rfl:     clonazePAM (KlonoPIN) 0.5 mg tablet, Take 0.5 mg by mouth 3 (three) times a day, Disp: , Rfl:     Continuous Blood Gluc  (FreeStyle Laureen 2 [place]) EDSON, , Disp: , Rfl:     Continuous Blood Gluc Sensor (FreeStyle Laureen 2 Sensor) MISC, , Disp: , Rfl:     insulin lispro (HumaLOG) 100 units/mL injection, Inject 8 Units under the skin, Disp: , Rfl:     lamoTRIgine (LaMICtal) 200 MG tablet, TAKE 1 TABLET TWICE DAILY. (Patient taking differently: Take 225 mg by mouth 2 (two) times a day), Disp: 180 tablet, Rfl: 1    losartan (COZAAR) 25 mg tablet, TAKE 1 TABLET BY MOUTH 1 TIME EACH DAY., Disp: , Rfl:     medroxyPROGESTERone acetate (DEPO-PROVERA SYRINGE) 150 mg/mL injection, INJECT 150 MG INJECT INTO THE MUSCLE EVERY 3 (THREE) MONTHS., Disp: , Rfl:     metFORMIN (GLUCOPHAGE-XR) 500 mg 24 hr tablet, Take 500 mg by mouth daily with " breakfast, Disp: , Rfl:     omeprazole (PriLOSEC) 40 MG capsule, , Disp: , Rfl:     potassium citrate (UROCIT-K) 10 mEq, 10 mEq, Disp: , Rfl:     pravastatin (PRAVACHOL) 40 mg tablet, Take 40 mg by mouth, Disp: , Rfl:     terconazole (TERAZOL 3) 0.8 % vaginal cream, Insert 1 applicator into the vagina daily at bedtime, Disp: 20 g, Rfl: 0    Tresiba FlexTouch 200 units/mL CONCENTRATED U-200 injection pen, INJECT 140 UNITS UNDER THE SKIN DAILY., Disp: , Rfl:     venlafaxine (EFFEXOR-XR) 75 mg 24 hr capsule, Take 75 mg by mouth every evening, Disp: , Rfl:     clotrimazole (LOTRIMIN) 1 % cream, Apply topically as needed (Patient not taking: Reported on 5/12/2023), Disp: , Rfl:     dicyclomine (BENTYL) 20 mg tablet, , Disp: , Rfl:     Dulaglutide (Trulicity) 0.75 MG/0.5ML SOPN, Inject 0.75 mg under the skin Once a week, Disp: , Rfl:     glucose blood test strip, Freestyle Lite test strips. Test 8XD. (Patient not taking: Reported on 5/12/2023), Disp: , Rfl:         Sebastian Cabezas MD

## 2024-08-22 ENCOUNTER — TELEPHONE (OUTPATIENT)
Age: 49
End: 2024-08-22

## 2024-08-22 NOTE — TELEPHONE ENCOUNTER
[facility] CT [facility] pelvis.  [facility]      NPI number is [id number]  Prefer address [address] [id number]   [facility] address if needed [address] [place]       Needs auth by 2pm on 8/29/24 or pt will be getting rescheduled

## 2024-08-30 NOTE — TELEPHONE ENCOUNTER
CHRIS Ewing from [facility] precert department needed the authorization for ct scan by 3 pm today.  Patient is having the ct  on Tuesday at 145 pm.   Without authorization the test will not be covered    Phone [phone]  The scheduling department   Access center number is  [phone].    Patient will need to call and cancel the test.

## 2024-08-30 NOTE — TELEPHONE ENCOUNTER
[facility] called in asking for an update on prior auth needed for patient's CT scan. States they need it by today at 3pm or patient will need to be reschedule. Please advise.    CB # [phone]

## 2025-04-16 ENCOUNTER — TELEPHONE (OUTPATIENT)
Dept: UROLOGY | Facility: CLINIC | Age: 50
End: 2025-04-16

## 2025-04-16 NOTE — TELEPHONE ENCOUNTER
Called patient twice and was trying to tell patient I was with [facility] in [place] and patient hung up both times. Unable to reschedule patients apt and/or reschedule apt.

## 2025-04-22 NOTE — TELEPHONE ENCOUNTER
Patient has been rescheduled for July 28th with Dr. Cabezas in the [place] office. Patient stated they will complete CT prior to apt.

## 2025-07-28 ENCOUNTER — TELEPHONE (OUTPATIENT)
Age: 50
End: 2025-07-28

## (undated) DEVICE — TRANSPOSAL ULTRAFLEX DUO/QUAD ULTRA CART MANIFOLD

## (undated) DEVICE — GLOVE INDICATOR PI UNDERGLOVE SZ 6.5 BLUE

## (undated) DEVICE — PACK TUR

## (undated) DEVICE — CHEMOTHERAPY CONTAINER,HINGED LID, YELLOW: Brand: SHARPSAFETY

## (undated) DEVICE — LUBRICANT SURGILUBE TUBE 4 OZ  FLIP TOP

## (undated) DEVICE — GLOVE SRG BIOGEL 7

## (undated) DEVICE — UROCATCH BAG

## (undated) DEVICE — TUBING SUCTION 5MM X 12 FT

## (undated) DEVICE — CONNECTOR PH 6-IN-1 Y ST: Brand: CARDINAL HEALTH

## (undated) DEVICE — TELFA NON-ADHERENT ABSORBENT DRESSING: Brand: TELFA

## (undated) DEVICE — SCD SEQUENTIAL COMPRESSION COMFORT SLEEVE MEDIUM KNEE LENGTH: Brand: KENDALL SCD

## (undated) DEVICE — GLOVE PI ULTRA TOUCH SZ 6

## (undated) DEVICE — BAG URINE DRAINAGE 2000ML ANTI RFLX LF

## (undated) DEVICE — CATH FOLEY 18FR 5ML 2 WAY SILICONE ELASTIMER